# Patient Record
Sex: FEMALE | Race: WHITE | NOT HISPANIC OR LATINO | Employment: PART TIME | URBAN - METROPOLITAN AREA
[De-identification: names, ages, dates, MRNs, and addresses within clinical notes are randomized per-mention and may not be internally consistent; named-entity substitution may affect disease eponyms.]

---

## 2017-01-10 ENCOUNTER — ALLSCRIPTS OFFICE VISIT (OUTPATIENT)
Dept: OTHER | Facility: OTHER | Age: 22
End: 2017-01-10

## 2017-02-27 ENCOUNTER — GENERIC CONVERSION - ENCOUNTER (OUTPATIENT)
Dept: OTHER | Facility: OTHER | Age: 22
End: 2017-02-27

## 2017-02-27 DIAGNOSIS — G40.209 LOCALZ-RLTD SYMPTOMATIC EPILEPSY W CMPLX PART SZ, NOTINTRAC, WO STATUS (HCC): ICD-10-CM

## 2017-03-01 ENCOUNTER — LAB CONVERSION - ENCOUNTER (OUTPATIENT)
Dept: OTHER | Facility: OTHER | Age: 22
End: 2017-03-01

## 2017-03-01 LAB
DEPRECATED RDW RBC AUTO: 12.4 % (ref 11–15)
HCT VFR BLD AUTO: 42.5 % (ref 35–45)
HGB BLD-MCNC: 14 G/DL (ref 11.7–15.5)
MCH RBC QN AUTO: 30.7 PG (ref 27–33)
MCHC RBC AUTO-ENTMCNC: 33 G/DL (ref 32–36)
MCV RBC AUTO: 93.2 FL (ref 80–100)
PLATELET # BLD AUTO: 209 THOUSAND/UL (ref 140–400)
PMV BLD AUTO: 8.6 FL (ref 7.5–12.5)
RBC # BLD AUTO: 4.56 MILLION/UL (ref 3.8–5.1)
WBC # BLD AUTO: 4.2 THOUSAND/UL (ref 3.8–10.8)

## 2017-03-02 ENCOUNTER — GENERIC CONVERSION - ENCOUNTER (OUTPATIENT)
Dept: OTHER | Facility: OTHER | Age: 22
End: 2017-03-02

## 2017-03-02 ENCOUNTER — LAB CONVERSION - ENCOUNTER (OUTPATIENT)
Dept: OTHER | Facility: OTHER | Age: 22
End: 2017-03-02

## 2017-03-03 ENCOUNTER — GENERIC CONVERSION - ENCOUNTER (OUTPATIENT)
Dept: OTHER | Facility: OTHER | Age: 22
End: 2017-03-03

## 2017-03-03 ENCOUNTER — LAB CONVERSION - ENCOUNTER (OUTPATIENT)
Dept: OTHER | Facility: OTHER | Age: 22
End: 2017-03-03

## 2017-03-03 LAB
10-HYDROXYCARBAZEPINE (HISTORICAL): 35.6 MCG/ML (ref 8–35)
A/G RATIO (HISTORICAL): 1.6 (CALC) (ref 1–2.5)
ALBUMIN SERPL BCP-MCNC: 4.8 G/DL (ref 3.6–5.1)
ALP SERPL-CCNC: 59 U/L (ref 33–115)
ALT SERPL W P-5'-P-CCNC: 10 U/L (ref 6–29)
AST SERPL W P-5'-P-CCNC: 15 U/L (ref 10–30)
BILIRUB SERPL-MCNC: 0.3 MG/DL (ref 0.2–1.2)
BUN SERPL-MCNC: 11 MG/DL (ref 7–25)
BUN/CREA RATIO (HISTORICAL): NORMAL (CALC) (ref 6–22)
CALCIUM SERPL-MCNC: 9.4 MG/DL (ref 8.6–10.2)
CHLORIDE SERPL-SCNC: 104 MMOL/L (ref 98–110)
CO2 SERPL-SCNC: 27 MMOL/L (ref 20–31)
CREAT SERPL-MCNC: 0.84 MG/DL (ref 0.5–1.1)
DEPRECATED RDW RBC AUTO: 12.4 % (ref 11–15)
EGFR AFRICAN AMERICAN (HISTORICAL): 115 ML/MIN/1.73M2
EGFR-AMERICAN CALC (HISTORICAL): 99 ML/MIN/1.73M2
GAMMA GLOBULIN (HISTORICAL): 3 G/DL (CALC) (ref 1.9–3.7)
GLUCOSE (HISTORICAL): 85 MG/DL (ref 65–99)
HCT VFR BLD AUTO: 42.5 % (ref 35–45)
HGB BLD-MCNC: 14 G/DL (ref 11.7–15.5)
MCH RBC QN AUTO: 30.7 PG (ref 27–33)
MCHC RBC AUTO-ENTMCNC: 33 G/DL (ref 32–36)
MCV RBC AUTO: 93.2 FL (ref 80–100)
PLATELET # BLD AUTO: 209 THOUSAND/UL (ref 140–400)
PMV BLD AUTO: 8.6 FL (ref 7.5–12.5)
POTASSIUM SERPL-SCNC: 4.2 MMOL/L (ref 3.5–5.3)
RBC # BLD AUTO: 4.56 MILLION/UL (ref 3.8–5.1)
SODIUM SERPL-SCNC: 139 MMOL/L (ref 135–146)
TOTAL PROTEIN (HISTORICAL): 7.8 G/DL (ref 6.1–8.1)
WBC # BLD AUTO: 4.2 THOUSAND/UL (ref 3.8–10.8)

## 2017-06-05 ENCOUNTER — ALLSCRIPTS OFFICE VISIT (OUTPATIENT)
Dept: OTHER | Facility: OTHER | Age: 22
End: 2017-06-05

## 2017-10-18 ENCOUNTER — LAB CONVERSION - ENCOUNTER (OUTPATIENT)
Dept: OTHER | Facility: OTHER | Age: 22
End: 2017-10-18

## 2017-10-18 ENCOUNTER — GENERIC CONVERSION - ENCOUNTER (OUTPATIENT)
Dept: OTHER | Facility: OTHER | Age: 22
End: 2017-10-18

## 2017-10-18 LAB
DEPRECATED RDW RBC AUTO: 11.7 % (ref 11–15)
HCT VFR BLD AUTO: 41.1 % (ref 35–45)
HGB BLD-MCNC: 13.8 G/DL (ref 11.7–15.5)
MCH RBC QN AUTO: 30.6 PG (ref 27–33)
MCHC RBC AUTO-ENTMCNC: 33.6 G/DL (ref 32–36)
MCV RBC AUTO: 91.1 FL (ref 80–100)
PLATELET # BLD AUTO: 236 THOUSAND/UL (ref 140–400)
PMV BLD AUTO: 10.1 FL (ref 7.5–12.5)
RBC # BLD AUTO: 4.51 MILLION/UL (ref 3.8–5.1)
WBC # BLD AUTO: 5 THOUSAND/UL (ref 3.8–10.8)

## 2017-10-20 ENCOUNTER — GENERIC CONVERSION - ENCOUNTER (OUTPATIENT)
Dept: OTHER | Facility: OTHER | Age: 22
End: 2017-10-20

## 2017-10-20 ENCOUNTER — LAB CONVERSION - ENCOUNTER (OUTPATIENT)
Dept: OTHER | Facility: OTHER | Age: 22
End: 2017-10-20

## 2017-10-20 LAB
A/G RATIO (HISTORICAL): 1.9 (CALC) (ref 1–2.5)
ALBUMIN SERPL BCP-MCNC: 5.1 G/DL (ref 3.6–5.1)
ALP SERPL-CCNC: 62 U/L (ref 33–115)
ALT SERPL W P-5'-P-CCNC: 10 U/L (ref 6–29)
AST SERPL W P-5'-P-CCNC: 14 U/L (ref 10–30)
BILIRUB SERPL-MCNC: 0.4 MG/DL (ref 0.2–1.2)
BUN SERPL-MCNC: 12 MG/DL (ref 7–25)
BUN/CREA RATIO (HISTORICAL): NORMAL (CALC) (ref 6–22)
CALCIUM SERPL-MCNC: 10.2 MG/DL (ref 8.6–10.2)
CHLORIDE SERPL-SCNC: 102 MMOL/L (ref 98–110)
CO2 SERPL-SCNC: 30 MMOL/L (ref 20–31)
CREAT SERPL-MCNC: 0.88 MG/DL (ref 0.5–1.1)
DEPRECATED RDW RBC AUTO: 11.7 % (ref 11–15)
EGFR AFRICAN AMERICAN (HISTORICAL): 108 ML/MIN/1.73M2
EGFR-AMERICAN CALC (HISTORICAL): 93 ML/MIN/1.73M2
GAMMA GLOBULIN (HISTORICAL): 2.7 G/DL (CALC) (ref 1.9–3.7)
GLUCOSE (HISTORICAL): 85 MG/DL (ref 65–99)
HCT VFR BLD AUTO: 41.1 % (ref 35–45)
HGB BLD-MCNC: 13.8 G/DL (ref 11.7–15.5)
LAMOTRIGINE LEVEL (HISTORICAL): <0.5 MCG/ML (ref 4–18)
Lab: <0.5 MCG/ML
MCH RBC QN AUTO: 30.6 PG (ref 27–33)
MCHC RBC AUTO-ENTMCNC: 33.6 G/DL (ref 32–36)
MCV RBC AUTO: 91.1 FL (ref 80–100)
PLATELET # BLD AUTO: 236 THOUSAND/UL (ref 140–400)
PMV BLD AUTO: 10.1 FL (ref 7.5–12.5)
POTASSIUM SERPL-SCNC: 4.3 MMOL/L (ref 3.5–5.3)
RBC # BLD AUTO: 4.51 MILLION/UL (ref 3.8–5.1)
SODIUM SERPL-SCNC: 139 MMOL/L (ref 135–146)
TOTAL PROTEIN (HISTORICAL): 7.8 G/DL (ref 6.1–8.1)
WBC # BLD AUTO: 5 THOUSAND/UL (ref 3.8–10.8)

## 2017-10-23 ENCOUNTER — ALLSCRIPTS OFFICE VISIT (OUTPATIENT)
Dept: OTHER | Facility: OTHER | Age: 22
End: 2017-10-23

## 2017-10-23 ENCOUNTER — TRANSCRIBE ORDERS (OUTPATIENT)
Dept: ADMINISTRATIVE | Facility: HOSPITAL | Age: 22
End: 2017-10-23

## 2017-10-23 DIAGNOSIS — Q07.8: Primary | ICD-10-CM

## 2017-10-23 DIAGNOSIS — Q04.8 OTHER SPECIFIED CONGENITAL MALFORMATIONS OF BRAIN (CODE): ICD-10-CM

## 2017-10-23 DIAGNOSIS — Q07.8: ICD-10-CM

## 2017-10-23 DIAGNOSIS — Q04.8 CEREBRAL CORTICAL DYSGENESIS (HCC): ICD-10-CM

## 2017-10-24 NOTE — PROGRESS NOTES
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)    Plan  Congenital cerebral ventriculomegaly, Localization-related focal epilepsy with complex  partial seizures, Periventricular heterotopia    · Follow-up visit in 4 Months Evaluation and Treatment  Follow-up SOVL  Status: Complete   Done: 23Oct2017   Ordered; For: Congenital cerebral ventriculomegaly, Localization-related focal epilepsy with complex partial seizures, Periventricular heterotopia; Ordered By: Donna Downey Performed:  Due: 03XCS7260; Last Updated By: Karthik June; 10/23/2017 9:13:03 AM  Congenital cerebral ventriculomegaly, Periventricular heterotopia    · * MRI BRAIN SEIZURE WO AND W CONTRAST; Status:Need Information - Financial  Authorization; Requested [facility]:[id number];    Perform:[facility] Radiology; Order Comments:25year old woman with periventricular heterotopia and abnormal right frontal FLAIR signal that needs to be assessed for stability based on MRi brain report in August 2016 ; Due:23Oct2018; Last Updated By:Blanca Olguin; 10/23/2017 9:13:03 AM;Ordered; For:Congenital cerebral ventriculomegaly, Periventricular heterotopia; Ordered By:Jayde Aquino; Localization-related focal epilepsy with complex partial seizures    · OXcarbazepine 300 MG Oral Tablet; take 1 tablet by mouth twice daily (take with  600mg tablet)   Rx By: Donna Downey; Dispense: 30 Days ; #:60 Tablet; Refill: 5;For: Localization-related focal epilepsy with complex partial seizures; DANIEL = N; Verified Transmission to [facility]PHARMACY #[id number] Msg to Pharmacy: please discontinue 150mg tabs; Last Updated By: System, [employer]; 10/23/2017 8:57:12 AM   · OXcarbazepine 600 MG Oral Tablet; TAKE 1 TABLET TWICE DAILY(along with  one 300mg tab)   Rx By: Donna Downey; Dispense: 30 Days ; #:60 Tablet;  Refill: 5;For: Localization-related focal epilepsy with complex partial seizures; DANIEL = N; Verified Transmission to [facility]PHARMACY #2703 Msg to Pharmacy: Patient will take one each of 600mg tab and 300mg tab twice a day (total dose of 900mg twice a day); Last Updated By: System, SureScripts; 10/23/2017 8:57:12 AM   · (Q) BASIC METABOLIC PANEL W/O eGFR; Status:Active; Requested for:27Nov2017;    Perform:[facility]; [facility]:[id number]; Ordered; For:Localization-related focal epilepsy with complex partial seizures; Ordered By:Jorge Aquino;   · (Q) OXCARBAZEPINE METABOLITE; Status:Active; Requested for:27Nov2017;    Perform:[facility]; OJM:[id number]; Ordered; For:Localization-related focal epilepsy with complex partial seizures; Ordered By:Jorge Aquino;    Discussion/Summary  Discussion Summary:   Ms Russella Councilman is a 25year old woman with a diagnosis of localization related, 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)  She is tolerating oxcarbazepine without significant side effects  She has had at least one conclusive witnessed seizure that was likely secondary generalized  She continues to have recurrent episodes of visual distortions of the left visual field that are concerning for focal seizures without altered awareness (based on the location of her heterotopia, seizures may involve visual pathways)  to recurrent visual seizures, I recommended increasing oxcarbazepine or another antiepileptic medication or transitioning to lamotrigine  Due to some benefit in her anxiety and at least no convulsive seizure, I am leery of getting her off of oxcarbazepine at this time  Lamotrigine may help with anxiety and mood disorders, I reviewed side effects of lamotrigine; drug rash, Peter Rocker syndrome, headaches, insomnia, irritability, blood dyscrasia, and mood swings   Alternative to lamotrigine is topiramate and zonisamide, both medications can cause kidney stones, heat stroke, cognitive impairment, depression, and abnormal taste and paresthesia; topiramate has been associated with weight loss while zonisamide is generally associated with weight gain  At this point she is agreeable to trying to increase her oxcarbazepine dose, side effects typically involve ataxia, clumsiness, imbalance, cognitive impairment, hyponatremia, and mood disorders  If she has the symptoms she is willing to try lamotrigine  previously reviewed the diagnosis of epilepsy and epileptic seizures, side effects of oxcarbazepine, periodic drug monitoring, issues related to women of child bearing age, and medications interactions  I recommended that she start folic acid when she is sexually active  Due to interactions between oxcarbazepine and OCPs, I recommended that should she need contraception, barrier method (condom) and IUDs are preferred given the risk of breakthrough bleeding with OCPs  I reviewed the [address] [place] [address] drug registry program for post marketing surveillance of risk of birth defects associated with antiepileptic medications  I reviewed that women not taking any medications has a approximately 2% risk of having a child with fetal malformations  Woman taking antiseizure medications is about twice or 4% risk, more so with older antiepileptic medications, from the [address] [place] [address] antiepileptic drug registry program oxcarbazepine has about 2-4% risk of fetal malformations  However this is with very limited less than 300 patients  Risk of breakthrough seizure during pregnancy is mostly associated with pre pregnancy seizure control  I did recommend genetic testing due to her bilateral subependymal heterotopias as there are some genes associated with heterotopia  This would require referral to [facility] for counseling  At this time patient is not interested  to recurrent focal visual seizures affecting the left visual field, I must report her most recent seizure   Given that she has not experienced altered awareness or confusion I will not recommend license suspension  However she was instructed to restrict driving to local roads and slower traffic, not speed up with yellow lights or run red lights, and come to a complete stop on turns    She should avoid the highways because there may not be sufficient space on the highway for her to safely pull over to the side of the road if she was to have a focal seizure  She agrees to this restriction  previously discussed medications to avoid / medications that lower the seizure threshold (example decongestants, including both pseudoephedrine and phenylephrine, diphenydramine)  is likely part of her congenital cortical malformation, but signs of hydrocephalus would include worsening headaches, loss of vision, blurry vision, lethargy, somnolence, or enlargement of visual blindspot, recommend monitoring for clinical changes  It has been more than a year from her last MRI brain study, the MRI brain study should be repeated due to the finding of right frontal subcortical FLAIR abnormality; repeat imaging is needed to assess for stability of the abnormality (rule out ongoing demyelination or underlying neoplasm)  - Continue Oxcarbazepine 600mg one tab twice a day and two 150mg tabs twice a day (next script will be to replace 150mg to 300mg tab; take one 300mg tab twice a day instead)- call the office if you are having side effects with oxcarbazepine, if so then decrease back to original dose of 750mg twice a day (600mg tab plus half 300mg tab)- [employer] will report focal visual seizures without altered awareness, see above- blood work around late November; BMP, oxcarbazepine- call the office if you continue to have focal visual seizures or medication side effects; let us know if you are tolerating oxcarbazepine dose- follow-up in 4 months  -repeat MRI brain study to assess the stability of white matter abnormal signal and ventriculomegaly      Counseling Documentation With Imm: The patient was counseled regarding risk factor reductions,-- prognosis,-- impressions,-- risks and benefits of treatment options  Decision making was of high-complexity due to the patient's high risk condition (seizures), psychiatric and neuropsychological comorbidities, behavioral problems, memory and cognitive problems and medication side effects  total amount of time spent with the patient was 46 minutes  More than 50% of this time was devoted to counseling and coordination of care  Issues addressed during this clinic visit included overall management, medication counseling or monitoring (including adverse effects, side effects and risks of antiepileptic medications)  Start time: 8:10AM End time: 8:56AM       Chief Complaint  Chief Complaint Free Text Note Form: patient is here today for a follow up for her seizures      History of Present Illness  Ms Jenn Cobos is a 25year old right handed woman here for follow-up evaluation of seizures and abnormal brain findings  AED/side effects/compliance:  Oxcarbazepine 750mg twice a day  No side effect  No missed doses    Event/Seizure semiology:  1  Lightheaded blurry vision, loss of consciousness, convulsion  (Last one 8/24/2016)  2  Blurry vision, circles in a part of her visual field, followed by a period of headache, no clear confusion or disorientation (described in January 2017 visit)    HPI: Initial intake epilepsy history 9/12/2016of hospital records  Char Miller was admitted from 8/24-8/26/2016 to [facility] after a witnessed seizure  She had an MRI brain study that showed cortical malformations including heterotopias, she was subsequently started on levetiracetam 500mg twice a day  Dr Jp Sweeney did the initial consultation; she reported that her first seizure was in June 2016, onset of lightheadedness, blurry vision, no memory of passing out but she collapsed, the next thing she remembered was waking up in the ambulance   On the day of admission she felt lightheaded, blurry vision, veering to the sides of the aisle and collapsed, her mother witnessed some of the event but only after she fell; patientâs eyes were open and mouth was moving, arms were tonically flexed and asymmetric and legs were hypertonic with repeated relaxation and tonic stiffening  Again she did not regain consciousness until she was in the ambulance  She had a history of one febrile seizure, a paternal great grandmother with seizures, head CT scan found ventriculomegaly  [id number], Ms Cunningham recalled that she was at work and the next thing she remembered was people standing over her, she did not remember what happened before other than feeling lightheaded and blurry vision  She denied feeling exhausted, new medications, drugs, illness/fevers, and there were no changes in her usual habits  August 2016, again she does not recall much other than being told she had a seizure for less minute  Her mother reported that she did see the end of the seizure; initially Ms Zeb Cooper had her the arms and legs extended outward, then arms were flexed inward, convulsion lasted about 30 minute, and she was unresponsive for about 5 minutes but she was lethargic for about 15-20 minutes afterwards  Her mother had briefly turned away and did not witness the very start of the seizure  Again, there were no alcohol, drugs, medications, or illness experienced by Ms Cunningham  She denies any history of myoclonus, staring spells, automatisms, unexplained hyperkinetic behaviors, olfactory / gustatory hallucinations, epigastric rising events, boogie vu events, visual hallucinations, unexplained nocturnal enuresis, or nocturnal tongue biting  At 17 months, she had a cold with a super high temperature, she had a seizure, deemed to have been a simple febrile seizure    reports that since she has been on levetiracetam, she has been experiencing daily headaches that come and go throughout the day may last for a couple of hours at a time  These are very severe because she does not do well with pain  Headache is best describe as a throbbing sensation for a couple of hours, daily, she does not take any medication for pain because she is not sure if she can  There is no visual changes, no loss of vision, or changes in refraction  history 1/10/0231086-029  She feel much better with OXC than LVT  She denied side effects, rash, or significant mood changes  She continues to have issues with anxiety, mostly as it involves the possibility of a recurrent seizure  She reports having had two episodes of visual changes  About two days ago, while playing bingo, she had a sensation of blurry vision, more of circles in a part of her visual field, the numbers on the bingo card was blurry but she did not recall which side of her vision was affected  The visual changes lasted a couple of minutes, but did not progress to zoning out or passing out  Afterwards, she had a mild headache for about half an hour  She did not recall when was the second episode  history [id number]  Focal visual seizure on 2017 and mid-May 2017 (double vision, more to the left side of her vision), denies disorientation or altered awareness  Stressed at work, her manager was rushing her, for about a minute, she experienced double vision on the left side of her visual field, without a sense of disorientation  She continues to feel anxious, but she has adjusted to the diagnosis of epilepsy  [employer] self-help online program did not help with anxiety  She does not want to see a psychologist at this time     Featuresepilepticus: NoInjury Seizures: NoFactors: None  Risk Factors:pregnancy: Nobirth/: NoDevelopment: walked after a year, slower with languageseizures, simple: Yesseizures, complex: Noinfection: Noretardation: Nopalsy: Noinjury (moderate/severe): Noneoplasm: Nomalformation: Yes â MRI brain found ventriculomegaly, abnormal splenium of corpus callosum, and subependymal heterotopiaprocedure: NoNoabuse: Noabuse: Nohistory Sz/epilepsy: No  AEDs:(headaches), oxcarbazepine, gabapentin (headaches, cognitive impairment)       Review of Systems  A review of 12 organ systems was completed and all systems were negative except for what is detailed in the HPI and noted below   Neurological ROS:   Musculoskeletal:  no arthralgias, no myalgias, no immobility or loss of function, no head/neck/back pain, no pain while walking  Psychiatric: anxiety-- and-- depression  Neurological General: headache,-- syncope-- and-- vision  Active Problems  1  Anxiety (300 00) (F41 9)   2  Congenital cerebral ventriculomegaly (742 4) (Q04 8)   3  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 40) (G40 209)   4  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)    Past Medical History      Past Psychiatric History:  Depression: episodic, history of counseling  Anxiety: No  Psychosis: No    OBGYN issues: no current plan for children, not currently sexually active   Active Problems And Past Medical History Reviewed: The active problems and past medical history were reviewed and updated today  Family History  Father    1  Family history of essential hypertension (V17 49) (Z82 49)  Paternal Great Grandmother    2  Family history of Seizures  Maternal Grandfather    3  Family history of myocardial infarction (V17 3) (Z82 49)  Paternal Grandfather    4  Family history of myocardial infarction (V17 3) (Z82 49)  Family History Reviewed: The family history was reviewed and updated today  Paternal Janae Muñoz started to have seizures later in life  No developmental issues  Parents are healthy        Social History   · Never a smoker  Social History Reviewed: The social history was reviewed and updated today  Living situation:  lives with parents  Tobacco:  No tobacco use   Alcohol:  No alcohol use      Drugs:  No illegal drug use  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, works in retail  Driving:  Active driving but avoiding highways  She is not sexually active at this time; she declines folic acid supplementation        Current Meds   1  OXcarbazepine 150 MG Oral Tablet; take 1 tablet by mouth twice daily (take with 600mg   tablet); Therapy: [id number] to (Jennye Faster)  Requested for: 61VXC1289; Last   Rx:05Jun2017 Ordered   2  OXcarbazepine 600 MG Oral Tablet; TAKE 1 TABLET TWICE DAILY(along with one 150mg   tab); Therapy: 78Vhg5053 to (Jewelene Faster)  Requested for: 44RUR3103; Last   Rx:05Jun2017 Ordered    Allergies  1  No Known Drug Allergies    Vitals  Signs   Recorded: 08WZD9026 07:51AM   Heart Rate: 71  Respiration: 18  Systolic: 012  Diastolic: 83  Height: 5 ft 3 in  Weight: 122 lb 8 oz  BMI Calculated: 21 7  BSA Calculated: 1 57  O2 Saturation: 99    Physical Exam  Physical Exam was deferred for counseling  GENERAL:  normally developed person in no acute distress, atraumatic head  Eyes: Anicteric    MENTAL STATUS  Orientation: Intact  Fund of knowledge: Intact  Attention/concentration: Intact  Recent/remote memory: Intact  Language: Intact, no aphasia present    Station/Gait: Normal gait  Results/Data  (Q) GABAPENTIN, PLASMA 17Oct2017 09:21AM [facility]     Test Name Result Flag Reference   GABAPENTIN, PLASMA <0 5 mcg/mL     Reference ranges for Gabapentin:  2 7-4 1 mcg/mL (peak) following a single dose of  900-1800 mg/day  4 0-8 5 mcg/mL (peak) following a multiple dose of  900-1800 mg/day administration  The reference range is evolving  Seizure control  has been observed at levels in excess of 4 mcg/mL  This test was developed and its analytical performance  characteristics have been determined by [address] [place] [address]  It has  not been cleared or approved by the [place]  Food and Drug  Administration  This assay has been validated pursuant  to the CLIA regulations and is used for clinical  purposes       (Q) CBC (H/H, RBC, INDICES, WBC, PLT) [id number] 09:21AM [facility]   REPORT COMMENT:  FASTING:YES  AN UPDATE OR CORRECTION HAS BEEN MADE TO NAME     Test Name Result Flag Reference   WHITE BLOOD CELL COUNT 5 0 Thousand/uL  3 8-10 8   RED BLOOD CELL COUNT 4 51 Million/uL  3 80-5 10   HEMOGLOBIN 13 8 g/dL  11 7-15 5   HEMATOCRIT 41 1 %  35 0-45 0   MCV 91 1 fL  80 0-100 0   MCH 30 6 pg  27 0-33 0   MCHC 33 6 g/dL  32 0-36 0   RDW 11 7 %  11 0-15 0   PLATELET COUNT 002 Thousand/uL  140-400   MPV 10 1 fL  7 5-12 5     (Q) LAMOTRIGINE 17Oct2017 09:21AM [facility]     Test Name Result Flag Reference   LAMOTRIGINE <0 5 mcg/mL L 4 0-18 0   This test was developed and its analytical performance  characteristics have been determined by [address] [place] [address]  It has  not been cleared or approved by the [place]  Food and Drug  Administration  This assay has been validated pursuant  to the CLIA regulations and is used for clinical  purposes  Diagnostic Studies Reviewed:   MRI Review 8/25/2016 brainsubependymal heterotopias in the region of the atria of the lateral ventricles with bilateral colpocephalyof the splenium of the corpus callosumnonspecific FLAIR hyperintensity subcortical white matter right frontal lobe    Diagnostic Review EEGs:focal slowing over the right posterior temporal region but limited to the T6 electrode, consider repeating the study  driving is lateralized to the left; thus, possibly relative neuronal dysfunction in the right posterior region  metabolite 35 6  5 0/13 8/41/838965/4 3/102/30/12/0 9/857 8/5 1/0 4/14/10/62<0 5<0 5    Verified Results  (1) COMPREHENSIVE METABOLIC PANEL [id number] 13:95EG [facility]     Test Name Result Flag Reference   GLUCOSE 85 mg/dL  65-99   Fasting reference interval   UREA NITROGEN (BUN) 12 mg/dL  7-25   CREATININE 0 88 mg/dL  0 50-1 10   eGFR NON-AFR   AMERICAN 93 mL/min/1 73m2  > OR = 60   eGFR  108 mL/min/1 73m2  > OR = 60   BUN/CREATININE RATIO   8-19   NOT APPLICABLE (calc)   SODIUM 139 mmol/L  135-146   POTASSIUM 4 3 mmol/L  3 5-5 3   CHLORIDE 102 mmol/L     CARBON DIOXIDE 30 mmol/L  20-31   CALCIUM 10 2 mg/dL  8 6-10 2   PROTEIN, TOTAL 7 8 g/dL  6 1-8 1   ALBUMIN 5 1 g/dL  3 6-5 1   GLOBULIN 2 7 g/dL (calc)  1 9-3 7   ALBUMIN/GLOBULIN RATIO 1 9 (calc)  1 0-2 5   BILIRUBIN, TOTAL 0 4 mg/dL  0 2-1 2   ALKALINE PHOSPHATASE 62 U/L     AST 14 U/L  10-30   ALT 10 U/L  6-29     (Q) GABAPENTIN, PLASMA 17Oct2017 09:21AM [facility]     Test Name Result Flag Reference   GABAPENTIN, PLASMA <0 5 mcg/mL     Reference ranges for Gabapentin:  2 7-4 1 mcg/mL (peak) following a single dose of  900-1800 mg/day  4 0-8 5 mcg/mL (peak) following a multiple dose of  900-1800 mg/day administration  The reference range is evolving  Seizure control  has been observed at levels in excess of 4 mcg/mL  This test was developed and its analytical performance  characteristics have been determined by [address] [place] [address]  It has  not been cleared or approved by the [place]  Food and Drug  Administration  This assay has been validated pursuant  to the CLIA regulations and is used for clinical  purposes       (Q) CBC (H/H, RBC, INDICES, WBC, PLT) [id number] 09:21AM [facility]   REPORT COMMENT:  FASTING:YES  AN UPDATE OR CORRECTION HAS BEEN MADE TO NAME     Test Name Result Flag Reference   WHITE BLOOD CELL COUNT 5 0 Thousand/uL  3 8-10 8   RED BLOOD CELL COUNT 4 51 Million/uL  3 80-5 10   HEMOGLOBIN 13 8 g/dL  11 7-15 5   HEMATOCRIT 41 1 %  35 0-45 0   MCV 91 1 fL  80 0-100 0   MCH 30 6 pg  27 0-33 0   MCHC 33 6 g/dL  32 0-36 0   RDW 11 7 %  11 0-15 0   PLATELET COUNT 850 Thousand/uL  140-400   MPV 10 1 fL  7 5-12 5     (Q) LAMOTRIGINE 17Oct2017 09:21AM [facility]     Test Name Result Flag Reference   LAMOTRIGINE <0 5 mcg/mL L 4 0-18 0   This test was developed and its analytical performance  characteristics have been determined by [address] [place] [address]  It has  not been cleared or approved by the [place]  Food and Drug  Administration  This assay has been validated pursuant  to the CLIA regulations and is used for clinical  purposes         Signatures   Electronically signed by : BRYAN Smith ; Oct 23 2017  5:59PM EST                       (Author)

## 2017-11-07 ENCOUNTER — HOSPITAL ENCOUNTER (OUTPATIENT)
Dept: RADIOLOGY | Facility: HOSPITAL | Age: 22
Discharge: HOME | End: 2017-11-07
Attending: PSYCHIATRY & NEUROLOGY
Payer: COMMERCIAL

## 2017-11-07 DIAGNOSIS — Q04.8 OTHER SPECIFIED CONGENITAL MALFORMATIONS OF BRAIN (CODE): ICD-10-CM

## 2017-11-07 DIAGNOSIS — Q07.8: ICD-10-CM

## 2017-11-07 PROCEDURE — 70553 MRI BRAIN STEM W/O & W/DYE: CPT

## 2017-11-07 PROCEDURE — A9585 GADOBUTROL INJECTION: HCPCS | Performed by: PSYCHIATRY & NEUROLOGY

## 2017-11-07 RX ADMIN — GADOBUTROL 5.67 ML: 604.72 INJECTION INTRAVENOUS at 13:34

## 2017-11-08 ENCOUNTER — GENERIC CONVERSION - ENCOUNTER (OUTPATIENT)
Dept: OTHER | Facility: OTHER | Age: 22
End: 2017-11-08

## 2017-11-18 ENCOUNTER — GENERIC CONVERSION - ENCOUNTER (OUTPATIENT)
Dept: OTHER | Facility: OTHER | Age: 22
End: 2017-11-18

## 2017-11-21 ENCOUNTER — GENERIC CONVERSION - ENCOUNTER (OUTPATIENT)
Dept: OTHER | Facility: OTHER | Age: 22
End: 2017-11-21

## 2017-11-21 LAB
10-HYDROXYCARBAZEPINE (HISTORICAL): 28.5 MCG/ML (ref 8–35)
BUN SERPL-MCNC: 10 MG/DL (ref 7–25)
BUN/CREA RATIO (HISTORICAL): NORMAL (CALC) (ref 6–22)
CALCIUM SERPL-MCNC: 9.5 MG/DL (ref 8.6–10.2)
CHLORIDE SERPL-SCNC: 103 MMOL/L (ref 98–110)
CO2 SERPL-SCNC: 28 MMOL/L (ref 20–31)
CREAT SERPL-MCNC: 0.88 MG/DL (ref 0.5–1.1)
GLUCOSE (HISTORICAL): 76 MG/DL (ref 65–99)
POTASSIUM SERPL-SCNC: 4.2 MMOL/L (ref 3.5–5.3)
SODIUM SERPL-SCNC: 138 MMOL/L (ref 135–146)

## 2017-12-08 ENCOUNTER — LAB CONVERSION - ENCOUNTER (OUTPATIENT)
Dept: OTHER | Facility: OTHER | Age: 22
End: 2017-12-08

## 2017-12-08 LAB
BUN SERPL-MCNC: 11 MG/DL (ref 7–25)
BUN/CREA RATIO (HISTORICAL): NORMAL (CALC) (ref 6–22)
CALCIUM SERPL-MCNC: 9.4 MG/DL (ref 8.6–10.2)
CHLORIDE SERPL-SCNC: 105 MMOL/L (ref 98–110)
CO2 SERPL-SCNC: 28 MMOL/L (ref 20–31)
CREAT SERPL-MCNC: 0.82 MG/DL (ref 0.5–1.1)
EGFR AFRICAN AMERICAN (HISTORICAL): 118 ML/MIN/1.73M2
EGFR-AMERICAN CALC (HISTORICAL): 102 ML/MIN/1.73M2
GLUCOSE (HISTORICAL): 80 MG/DL (ref 65–99)
POTASSIUM SERPL-SCNC: 4.3 MMOL/L (ref 3.5–5.3)
SODIUM SERPL-SCNC: 139 MMOL/L (ref 135–146)

## 2017-12-10 ENCOUNTER — GENERIC CONVERSION - ENCOUNTER (OUTPATIENT)
Dept: OTHER | Facility: OTHER | Age: 22
End: 2017-12-10

## 2017-12-12 ENCOUNTER — LAB CONVERSION - ENCOUNTER (OUTPATIENT)
Dept: OTHER | Facility: OTHER | Age: 22
End: 2017-12-12

## 2017-12-12 LAB
10-HYDROXYCARBAZEPINE (HISTORICAL): 37.6 MCG/ML (ref 8–35)
BUN SERPL-MCNC: 11 MG/DL (ref 7–25)
BUN/CREA RATIO (HISTORICAL): NORMAL (CALC) (ref 6–22)
CALCIUM SERPL-MCNC: 9.4 MG/DL (ref 8.6–10.2)
CHLORIDE SERPL-SCNC: 105 MMOL/L (ref 98–110)
CO2 SERPL-SCNC: 28 MMOL/L (ref 20–31)
CREAT SERPL-MCNC: 0.82 MG/DL (ref 0.5–1.1)
EGFR AFRICAN AMERICAN (HISTORICAL): 118 ML/MIN/1.73M2
EGFR-AMERICAN CALC (HISTORICAL): 102 ML/MIN/1.73M2
GLUCOSE (HISTORICAL): 80 MG/DL (ref 65–99)
POTASSIUM SERPL-SCNC: 4.3 MMOL/L (ref 3.5–5.3)
SODIUM SERPL-SCNC: 139 MMOL/L (ref 135–146)

## 2017-12-13 ENCOUNTER — GENERIC CONVERSION - ENCOUNTER (OUTPATIENT)
Dept: OTHER | Facility: OTHER | Age: 22
End: 2017-12-13

## 2018-01-11 NOTE — RESULT NOTES
Verified Results  (1) COMPREHENSIVE METABOLIC PANEL [id number] 60:86DL [facility]     Test Name Result Flag Reference   GLUCOSE 85 mg/dL  65-99   Fasting reference interval   UREA NITROGEN (BUN) 12 mg/dL  7-25   CREATININE 0 88 mg/dL  0 50-1 10   eGFR NON-AFR  AMERICAN 93 mL/min/1 73m2  > OR = 60   eGFR AFRICAN AMERICAN 108 mL/min/1 73m2  > OR = 60   BUN/CREATININE RATIO   4-18   NOT APPLICABLE (calc)   SODIUM 139 mmol/L  135-146   POTASSIUM 4 3 mmol/L  3 5-5 3   CHLORIDE 102 mmol/L     CARBON DIOXIDE 30 mmol/L  20-31   CALCIUM 10 2 mg/dL  8 6-10 2   PROTEIN, TOTAL 7 8 g/dL  6 1-8 1   ALBUMIN 5 1 g/dL  3 6-5 1   GLOBULIN 2 7 g/dL (calc)  1 9-3 7   ALBUMIN/GLOBULIN RATIO 1 9 (calc)  1 0-2 5   BILIRUBIN, TOTAL 0 4 mg/dL  0 2-1 2   ALKALINE PHOSPHATASE 62 U/L     AST 14 U/L  10-30   ALT 10 U/L  6-29     (Q) GABAPENTIN, PLASMA [id number] 09:21AM [facility]     Test Name Result Flag Reference   GABAPENTIN, PLASMA <0 5 mcg/mL     Reference ranges for Gabapentin:  2 7-4 1 mcg/mL (peak) following a single dose of  900-1800 mg/day  4 0-8 5 mcg/mL (peak) following a multiple dose of  900-1800 mg/day administration  The reference range is evolving  Seizure control  has been observed at levels in excess of 4 mcg/mL  This test was developed and its analytical performance  characteristics have been determined by [address] [place] [address]  It has  not been cleared or approved by the [place]  Food and Drug  Administration  This assay has been validated pursuant  to the CLIA regulations and is used for clinical  purposes       (Q) CBC (H/H, RBC, INDICES, WBC, PLT) [id number] 09:21AM [facility]   REPORT COMMENT:  FASTING:YES  AN UPDATE OR CORRECTION HAS BEEN MADE TO NAME     Test Name Result Flag Reference   WHITE BLOOD CELL COUNT 5 0 Thousand/uL  3 8-10 8   RED BLOOD CELL COUNT 4 51 Million/uL  3 80-5 10   HEMOGLOBIN 13 8 g/dL  11 7-15 5   HEMATOCRIT 41 1 %  35 0-45 0   MCV 91 1 fL  80 0-100 0   MCH 30 6 pg  27 0-33 0   MCHC 33 6 g/dL  32 0-36 0   RDW 11 7 %  11 0-15 0   PLATELET COUNT 218 Thousand/uL  140-400   MPV 10 1 fL  7 5-12 5

## 2018-01-11 NOTE — PROCEDURES
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 G40 209    Start time:  End time: 94     -------------------------------------------------------------------------------------------------------------------   Patient History: This recording was performed in a 24 y o  female  with multiple seizures and subependymal gray matter heterotopia to confirm epilepsy classification and re-evaluate right posterior temporal focal slowing seen on prior EEG  Medications include:   levetiracetam    -------------------------------------------------------------------------------------------------------------------   Description of Procedure:  ·  32 channel digital recording with electrodes placed according to the International 10-20 system with additional  T1/T2 electrodes, EOG, EKG, and simultaneous  video  The recording was technically satisfactory  -------------------------------------------------------------------------------------------------------------------   EEG Description:    The recording was performed with the patient awake and drowsy  She was oriented  During wakefulness, there were long runs of well regulated, low to medium amplitude,  posteriorly dominant, symmetric 10 cps alpha rhythm that attenuated with eye opening  There were symmetric low amplitude, frontally dominant  beta activities       With drowsiness, alpha activity attenuated some and and there were intermixed diffusely distributed  low amplitude theta activities  Stage 2 sleep was not attained  Activation Procedures:  Hyperventilation was performed for 3-4  minutes and did not produce any changes  Stepped photic stimulation between 1-3 0 cps induced photic driving that lateralized to the left (better formed and more consistently seen on the left)  Other findings:  Samples of the single lead ECG demonstrated a regular rhythm  Events:   No significant push buttons were activated  -------------------------------------------------------------------------------------------------------------------   EEG Interpretation: This Routine EEG recorded during wakefulness and drowsiness mildly abnormal     Photic driving is lateralized to the left raising the possibility of relative neuronal dysfunction over the right hemisphere, including the possibility of focal neuronal dysfunction over the right posterior region  In the absence of other focal or lateralizing  abnormalities this finding is of uncertain significance  However, the prior EEG in 8/2016 showed focal slowing over the right posterior temporal providing further evidence that asymmetric driving may be due to neuronal dysfunction in the right posterior  region  No interictal epileptiform discharges are present  Ankur Larsen MD   [address] [facility] Margarita MARRERO    Sep 23 2016 10:36AM [facility] Standard Time

## 2018-01-12 NOTE — RESULT NOTES
Verified Results  (Q) BASIC METABOLIC PANEL W/O eGFR [id number] 10:05AM [facility]   REPORT COMMENT:  FASTING:NO     Test Name Result Flag Reference   GLUCOSE 76 mg/dL  65-99   Fasting reference interval   UREA NITROGEN (BUN) 10 mg/dL  7-25   CREATININE 0 88 mg/dL  0 50-1 10   BUN/CREATININE RATIO   3-15   NOT APPLICABLE (calc)   SODIUM 138 mmol/L  135-146   POTASSIUM 4 2 mmol/L  3 5-5 3   CHLORIDE 103 mmol/L     CARBON DIOXIDE 28 mmol/L  20-31   CALCIUM 9 5 mg/dL  8 6-10 2

## 2018-01-12 NOTE — RESULT NOTES
Verified Results  * MRI BRAIN SEIZURE WO AND W CONTRAST [id number] 12:32PM Jennifer Cross Order Number: [id number]   Performing Comments: 25year old woman with periventricular heterotopia and abnormal right frontal FLAIR signal that needs to be assessed for stability based on MRi brain report in August 2016    - Patient Instructions: To schedule this appointment,    please contact Central Scheduling at [phone]  Test Name Result Flag Reference   MRI BRAIN SEIZURE WO AND W CONTRAST (Report)     MRI BRAIN - WITH AND WITHOUT CONTRAST     INDICATION: Seizure disorder , Periventricular heterotopia, abnormal FLAIR signal     COMPARISON: MR 8/25/2016     TECHNIQUE: Sagittal 3D SPGR, axial T2, axial FLAIR, axial T1, axial Morris, coronal T2 and FLAIR  Post-contrast axial T1 , Sagittal 3D SPGR with coronal recons  IV Contrast: 5 67 mL of gadobutrol injection (MULTI-DOSE)      IMAGE QUALITY:  Diagnostic  FINDINGS:     BRAIN PARENCHYMA: No acute disease  There is no acute ischemia, intracranial mass, mass effect or edema  No hemosiderin deposition  Stable colpocephalic appearance to the ventricles, right greater than left, bowing/stretching of the posterior corpus    callosum  Subtle foci what could be nodular heterotopia along the atria of both lateral ventricles more apparent on the right  Tiny focus of increased signal in the white matter of the right frontal horn is less apparent  This has not progressed  Hippocampi are somewhat dysmorphic bilaterally  Postcontrast imaging of the brain demonstrates no abnormal enhancement  VENTRICLES: Stable colpocephaly  SELLA AND PITUITARY GLAND: Normal      ORBITS: Normal      PARANASAL SINUSES: Trace scattered mucosal disease, minor left mastoid fluid  VASCULATURE: Evaluation of the major intracranial vasculature demonstrates appropriate flow voids       CALVARIUM AND SKULL BASE: Normal      EXTRACRANIAL SOFT TISSUES: Normal  IMPRESSION:     Stable MR appearance the brain  No acute disease  Right greater than left colpocephaly, minor nodular heterotopia  Signal abnormality within the right frontal white matter is less conspicuous  Workstation performed: [id number]     Signed by:    Raven Ernandez MD   11/8/17

## 2018-01-12 NOTE — RESULT NOTES
Verified Results  (Q) OXCARBAZEPINE METABOLITE [id number] 10:05AM Edita Moraa   REPORT COMMENT:  FASTING:NO     Test Name Result Flag Reference   10-HYDROXYCARBAZEPINE 28 5 mcg/mL  8 0-35 0     (Q) BASIC METABOLIC PANEL W/O eGFR [id number] 10:05AM [facility]     Test Name Result Flag Reference   GLUCOSE 76 mg/dL  65-99   Fasting reference interval   UREA NITROGEN (BUN) 10 mg/dL  7-25   CREATININE 0 88 mg/dL  0 50-1 10   BUN/CREATININE RATIO   5-28   NOT APPLICABLE (calc)   SODIUM 138 mmol/L  135-146   POTASSIUM 4 2 mmol/L  3 5-5 3   CHLORIDE 103 mmol/L     CARBON DIOXIDE 28 mmol/L  20-31   CALCIUM 9 5 mg/dL  8 6-10 2

## 2018-01-13 VITALS
WEIGHT: 120 LBS | SYSTOLIC BLOOD PRESSURE: 118 MMHG | DIASTOLIC BLOOD PRESSURE: 72 MMHG | BODY MASS INDEX: 21.26 KG/M2 | RESPIRATION RATE: 16 BRPM | HEART RATE: 76 BPM | HEIGHT: 63 IN

## 2018-01-13 VITALS
WEIGHT: 120 LBS | HEART RATE: 72 BPM | BODY MASS INDEX: 21.26 KG/M2 | RESPIRATION RATE: 16 BRPM | DIASTOLIC BLOOD PRESSURE: 74 MMHG | SYSTOLIC BLOOD PRESSURE: 120 MMHG | HEIGHT: 63 IN

## 2018-01-13 VITALS
OXYGEN SATURATION: 99 % | HEIGHT: 63 IN | BODY MASS INDEX: 21.71 KG/M2 | HEART RATE: 71 BPM | RESPIRATION RATE: 18 BRPM | SYSTOLIC BLOOD PRESSURE: 127 MMHG | DIASTOLIC BLOOD PRESSURE: 83 MMHG | WEIGHT: 122.5 LBS

## 2018-01-13 NOTE — RESULT NOTES
Verified Results  (1) COMPREHENSIVE METABOLIC PANEL [id number] 53:74OU Oval      Test Name Result Flag Reference   GLUCOSE 85 mg/dL  65-99   Fasting reference interval   UREA NITROGEN (BUN) 11 mg/dL  7-25   CREATININE 0 84 mg/dL  0 50-1 10   eGFR NON-AFR   AMERICAN 99 mL/min/1 73m2  > OR = 60   eGFR AFRICAN AMERICAN 115 mL/min/1 73m2  > OR = 60   BUN/CREATININE RATIO   4-70   NOT APPLICABLE (calc)   SODIUM 139 mmol/L  135-146   POTASSIUM 4 2 mmol/L  3 5-5 3   CHLORIDE 104 mmol/L     CARBON DIOXIDE 27 mmol/L  20-31   CALCIUM 9 4 mg/dL  8 6-10 2   PROTEIN, TOTAL 7 8 g/dL  6 1-8 1   ALBUMIN 4 8 g/dL  3 6-5 1   GLOBULIN 3 0 g/dL (calc)  1 9-3 7   ALBUMIN/GLOBULIN RATIO 1 6 (calc)  1 0-2 5   BILIRUBIN, TOTAL 0 3 mg/dL  0 2-1 2   ALKALINE PHOSPHATASE 59 U/L     AST 15 U/L  10-30   ALT 10 U/L  6-29     (Q) CBC (H/H, RBC, INDICES, WBC, PLT) [id number] 10:04AM [facility]    REPORT COMMENT:  FASTING:NO     Test Name Result Flag Reference   WHITE BLOOD CELL COUNT 4 2 Thousand/uL  3 8-10 8   RED BLOOD CELL COUNT 4 56 Million/uL  3 80-5 10   HEMOGLOBIN 14 0 g/dL  11 7-15 5   HEMATOCRIT 42 5 %  35 0-45 0   MCV 93 2 fL  80 0-100 0   MCH 30 7 pg  27 0-33 0   MCHC 33 0 g/dL  32 0-36 0   RDW 12 4 %  11 0-15 0   PLATELET COUNT 111 Thousand/uL  140-400   MPV 8 6 fL  7 5-12 5     (Q) CBC (H/H, RBC, INDICES, WBC, PLT) [id number] 10:04AM [facility]    REPORT COMMENT:  FASTING:NO     Test Name Result Flag Reference   WHITE BLOOD CELL COUNT 4 2 Thousand/uL  3 8-10 8   RED BLOOD CELL COUNT 4 56 Million/uL  3 80-5 10   HEMOGLOBIN 14 0 g/dL  11 7-15 5   HEMATOCRIT 42 5 %  35 0-45 0   MCV 93 2 fL  80 0-100 0   MCH 30 7 pg  27 0-33 0   MCHC 33 0 g/dL  32 0-36 0   RDW 12 4 %  11 0-15 0   PLATELET COUNT 205 Thousand/uL  140-400   MPV 8 6 fL  7 5-12 5

## 2018-01-13 NOTE — RESULT NOTES
Verified Results  (1) COMPREHENSIVE METABOLIC PANEL [id number] 41:61WV Horleonard Lajama     Test Name Result Flag Reference   GLUCOSE 85 mg/dL  65-99   Fasting reference interval   UREA NITROGEN (BUN) 12 mg/dL  7-25   CREATININE 0 88 mg/dL  0 50-1 10   eGFR NON-AFR   AMERICAN 93 mL/min/1 73m2  > OR = 60   eGFR AFRICAN AMERICAN 108 mL/min/1 73m2  > OR = 60   BUN/CREATININE RATIO   5-03   NOT APPLICABLE (calc)   SODIUM 139 mmol/L  135-146   POTASSIUM 4 3 mmol/L  3 5-5 3   CHLORIDE 102 mmol/L     CARBON DIOXIDE 30 mmol/L  20-31   CALCIUM 10 2 mg/dL  8 6-10 2   PROTEIN, TOTAL 7 8 g/dL  6 1-8 1   ALBUMIN 5 1 g/dL  3 6-5 1   GLOBULIN 2 7 g/dL (calc)  1 9-3 7   ALBUMIN/GLOBULIN RATIO 1 9 (calc)  1 0-2 5   BILIRUBIN, TOTAL 0 4 mg/dL  0 2-1 2   ALKALINE PHOSPHATASE 62 U/L     AST 14 U/L  10-30   ALT 10 U/L  6-29     (Q) CBC (H/H, RBC, INDICES, WBC, PLT) [id number] 09:21AM Pepe Aquino   REPORT COMMENT:  FASTING:YES  AN UPDATE OR CORRECTION HAS BEEN MADE TO NAME     Test Name Result Flag Reference   WHITE BLOOD CELL COUNT 5 0 Thousand/uL  3 8-10 8   RED BLOOD CELL COUNT 4 51 Million/uL  3 80-5 10   HEMOGLOBIN 13 8 g/dL  11 7-15 5   HEMATOCRIT 41 1 %  35 0-45 0   MCV 91 1 fL  80 0-100 0   MCH 30 6 pg  27 0-33 0   MCHC 33 6 g/dL  32 0-36 0   RDW 11 7 %  11 0-15 0   PLATELET COUNT 392 Thousand/uL  140-400   MPV 10 1 fL  7 5-12 5

## 2018-01-14 NOTE — PROCEDURES
Procedures by Esteban Atkins MD at  2016  2:28 PM      Author:  Esteban Atkins MD Service:  Neurology Author Type:  Physician     Filed:  2016  2:37 PM Date of Service:  2016  2:28 PM Status:  Signed     :  Esteban Atkins MD (Physician)            ELECTROENCEPHALOGRAM    Patient Name:  Mia Noguera  MRN: [id number]   :  1995 File #: [facility] A[facility]    Age: 24 y o  Encounter #: [id number]   Date performed: 2016 Referring Provider: Magali Reyez DO          Report date: 2016          Study type: awake EEG    ICD 10 diagnosis: Other Epilepsy G40 909 and Spells/Fit NOS R56 9    Patient History:  EEG is requested to assess for seizures and/or classification of epilepsy  Patient is 24 y o  female presenting to hospital after a seizure, she had one other seizure in 2016  Current AEDs:  Medications include:   enoxaparin 40 mg Subcutaneous Daily   levETIRAcetam 500 mg Intravenous Q12H       Description of Procedure: The EEG was performed with electrodes applied using the International 10-20 System  Additional electrodes used included EOG, ECG and T1/T2 electrodes  A single lead ECG channel is also  present  At least 16 channels are reviewed at a time  and formatted into longitudinal bipolar, transverse bipolar, and referential (to common reference or calculated common reference) montages  The EEG was recorded  with the patient awake, drowsy, and asleep  The recording was technically satisfactory  EEG was recorded from 13:16 to 13:44  Findings:   Background Activity: The background is grossly symmetric with respect to voltages and activities  During wakefulness, the background is well-organized with anterior low amplitude beta activity and low-moderate amplitude  posterior alpha activity  There is a symmetric 8 5-9 Hz posterior dominant rhythm that attenuates with eye opening        Drowsiness is characterized by attenuation of the alpha rhythm, prominence of anterior beta, central theta activity, presence of vertex waves, and positive occipital sharp transients of sleep (POSTS)  Stage 2 sleep is characterized by symmetric sleep spindles and K-complexes  Activation Procedures:   Hyperventilation was not performed  Stepped photic stimulation from 1 to 30 fps was performed and produced symmetric photic driving response  Abnormal Findings: There is intermittent polymorphic low-moderate voltage 1-1 5 Hz delta activity over the right posterior temporal region but is limited to the T6 electrode  Other findings: The single lead ECG shows a regular sinus cardiac rhythm  Interpretation: This is an abnormal 28 minutes awake, drowsy, and asleep  EEG due to intermittent focal slowing over the right posterior temporal region but limited to the T6 electrode  This finding may indicate focal cerebral dysfunction over the right posterior temporal region, possibly structural in origin; correlation with intracranial imaging is recommended  Given that the focal slowing is very limited to the T6 electrode, repeating the EEG study in the future is recommended to determine if it is a persistent finding versus electrode artifact  Ian Hess, MD [facility]  Elaine MARRERO    Aug 25 2016  2:35PM [facility] Standard Time

## 2018-01-17 NOTE — RESULT NOTES
Verified Results  (1) COMPREHENSIVE METABOLIC PANEL [id number] 80:38LI Juan Motley     Test Name Result Flag Reference   GLUCOSE 76 mg/dL  65-99   Fasting reference interval   UREA NITROGEN (BUN) 10 mg/dL  7-25   CREATININE 0 81 mg/dL  0 50-1 10   eGFR NON-AFR   AMERICAN 104 mL/min/1 73m2  > OR = 60   eGFR AFRICAN AMERICAN 120 mL/min/1 73m2  > OR = 60   BUN/CREATININE RATIO   2-10   NOT APPLICABLE (calc)   SODIUM 138 mmol/L  135-146   POTASSIUM 4 3 mmol/L  3 5-5 3   CHLORIDE 102 mmol/L     CARBON DIOXIDE 27 mmol/L  20-31   CALCIUM 9 7 mg/dL  8 6-10 2   PROTEIN, TOTAL 8 0 g/dL  6 1-8 1   ALBUMIN 5 1 g/dL  3 6-5 1   GLOBULIN 2 9 g/dL (calc)  1 9-3 7   ALBUMIN/GLOBULIN RATIO 1 8 (calc)  1 0-2 5   BILIRUBIN, TOTAL 0 4 mg/dL  0 2-1 2   ALKALINE PHOSPHATASE 55 U/L     AST 14 U/L  10-30   ALT 9 U/L  6-29     (Q) CBC (H/H, RBC, INDICES, WBC, PLT) [id number] 09:45AM Juan Motley     Test Name Result Flag Reference   WHITE BLOOD CELL COUNT 5 9 Thousand/uL  3 8-10 8   RED BLOOD CELL COUNT 4 70 Million/uL  3 80-5 10   HEMOGLOBIN 13 9 g/dL  11 7-15 5   HEMATOCRIT 42 6 %  35 0-45 0   MCV 90 6 fL  80 0-100 0   MCH 29 7 pg  27 0-33 0   MCHC 32 7 g/dL  32 0-36 0   RDW 12 1 %  11 0-15 0   PLATELET COUNT 945 Thousand/uL  140-400   MPV 8 8 fL  7 5-11 5     (Q) OXCARBAZEPINE METABOLITE [id number] 09:45AM Juan Motley   REPORT COMMENT:  FASTING:NO     Test Name Result Flag Reference   10-HYDROXYCARBAZEPINE 9 5 mcg/mL  8 0-35 0

## 2018-01-18 NOTE — RESULT NOTES
Verified Results  (1) COMPREHENSIVE METABOLIC PANEL [id number] 32:63DR Dewain Pap     Test Name Result Flag Reference   GLUCOSE 76 mg/dL  65-99   Fasting reference interval   UREA NITROGEN (BUN) 10 mg/dL  7-25   CREATININE 0 81 mg/dL  0 50-1 10   eGFR NON-AFR   AMERICAN 104 mL/min/1 73m2  > OR = 60   eGFR AFRICAN AMERICAN 120 mL/min/1 73m2  > OR = 60   BUN/CREATININE RATIO   0-92   NOT APPLICABLE (calc)   SODIUM 138 mmol/L  135-146   POTASSIUM 4 3 mmol/L  3 5-5 3   CHLORIDE 102 mmol/L     CARBON DIOXIDE 27 mmol/L  20-31   CALCIUM 9 7 mg/dL  8 6-10 2   PROTEIN, TOTAL 8 0 g/dL  6 1-8 1   ALBUMIN 5 1 g/dL  3 6-5 1   GLOBULIN 2 9 g/dL (calc)  1 9-3 7   ALBUMIN/GLOBULIN RATIO 1 8 (calc)  1 0-2 5   BILIRUBIN, TOTAL 0 4 mg/dL  0 2-1 2   ALKALINE PHOSPHATASE 55 U/L     AST 14 U/L  10-30   ALT 9 U/L  6-29     (Q) CBC (H/H, RBC, INDICES, WBC, PLT) [id number] 09:45AM Dewain Pap   REPORT COMMENT:  FASTING:NO     Test Name Result Flag Reference   WHITE BLOOD CELL COUNT 5 9 Thousand/uL  3 8-10 8   RED BLOOD CELL COUNT 4 70 Million/uL  3 80-5 10   HEMOGLOBIN 13 9 g/dL  11 7-15 5   HEMATOCRIT 42 6 %  35 0-45 0   MCV 90 6 fL  80 0-100 0   MCH 29 7 pg  27 0-33 0   MCHC 32 7 g/dL  32 0-36 0   RDW 12 1 %  11 0-15 0   PLATELET COUNT 006 Thousand/uL  140-400   MPV 8 8 fL  7 5-11 5

## 2018-01-18 NOTE — RESULT NOTES
Verified Results  (1) COMPREHENSIVE METABOLIC PANEL [id number] 36:64ZM Jack Min     Test Name Result Flag Reference   GLUCOSE 85 mg/dL  65-99   Fasting reference interval   UREA NITROGEN (BUN) 11 mg/dL  7-25   CREATININE 0 84 mg/dL  0 50-1 10   eGFR NON-AFR   AMERICAN 99 mL/min/1 73m2  > OR = 60   eGFR AFRICAN AMERICAN 115 mL/min/1 73m2  > OR = 60   BUN/CREATININE RATIO   3-34   NOT APPLICABLE (calc)   SODIUM 139 mmol/L  135-146   POTASSIUM 4 2 mmol/L  3 5-5 3   CHLORIDE 104 mmol/L     CARBON DIOXIDE 27 mmol/L  20-31   CALCIUM 9 4 mg/dL  8 6-10 2   PROTEIN, TOTAL 7 8 g/dL  6 1-8 1   ALBUMIN 4 8 g/dL  3 6-5 1   GLOBULIN 3 0 g/dL (calc)  1 9-3 7   ALBUMIN/GLOBULIN RATIO 1 6 (calc)  1 0-2 5   BILIRUBIN, TOTAL 0 3 mg/dL  0 2-1 2   ALKALINE PHOSPHATASE 59 U/L     AST 15 U/L  10-30   ALT 10 U/L  6-29     (Q) CBC (H/H, RBC, INDICES, WBC, PLT) [id number] 10:04AM Jack Min     Test Name Result Flag Reference   WHITE BLOOD CELL COUNT 4 2 Thousand/uL  3 8-10 8   RED BLOOD CELL COUNT 4 56 Million/uL  3 80-5 10   HEMOGLOBIN 14 0 g/dL  11 7-15 5   HEMATOCRIT 42 5 %  35 0-45 0   MCV 93 2 fL  80 0-100 0   MCH 30 7 pg  27 0-33 0   MCHC 33 0 g/dL  32 0-36 0   RDW 12 4 %  11 0-15 0   PLATELET COUNT 162 Thousand/uL  140-400   MPV 8 6 fL  7 5-12 5     (Q) OXCARBAZEPINE METABOLITE [id number] 10:04AM Jack Min   REPORT COMMENT:  FASTING:NO     Test Name Result Flag Reference   10-HYDROXYCARBAZEPINE 35 6 mcg/mL H 8 0-35 0

## 2018-01-23 NOTE — RESULT NOTES
Verified Results  (1) BASIC METABOLIC PROFILE [id number] 09:40AM Delmis Button     Test Name Result Flag Reference   GLUCOSE 80 mg/dL  65-99   Fasting reference interval   UREA NITROGEN (BUN) 11 mg/dL  7-25   CREATININE 0 82 mg/dL  0 50-1 10   eGFR NON-AFR   AMERICAN 102 mL/min/1 73m2  > OR = 60   eGFR AFRICAN AMERICAN 118 mL/min/1 73m2  > OR = 60   BUN/CREATININE RATIO   7-54   NOT APPLICABLE (calc)   SODIUM 139 mmol/L  135-146   POTASSIUM 4 3 mmol/L  3 5-5 3   CHLORIDE 105 mmol/L     CARBON DIOXIDE 28 mmol/L  20-31   CALCIUM 9 4 mg/dL  8 6-10 2     (Q) OXCARBAZEPINE METABOLITE [id number] 09:40AM Delmis Button   REPORT COMMENT:  FASTING:NO     Test Name Result Flag Reference   10-HYDROXYCARBAZEPINE 37 6 mcg/mL H 8 0-35 0

## 2018-01-23 NOTE — RESULT NOTES
Verified Results  (1) BASIC METABOLIC PROFILE [id number] 09:40AM Fredi Labette   REPORT COMMENT:  FASTING:NO     Test Name Result Flag Reference   GLUCOSE 80 mg/dL  65-99   Fasting reference interval   UREA NITROGEN (BUN) 11 mg/dL  7-25   CREATININE 0 82 mg/dL  0 50-1 10   eGFR NON-AFR   AMERICAN 102 mL/min/1 73m2  > OR = 60   eGFR AFRICAN AMERICAN 118 mL/min/1 73m2  > OR = 60   BUN/CREATININE RATIO   0-80   NOT APPLICABLE (calc)   SODIUM 139 mmol/L  135-146   POTASSIUM 4 3 mmol/L  3 5-5 3   CHLORIDE 105 mmol/L     CARBON DIOXIDE 28 mmol/L  20-31   CALCIUM 9 4 mg/dL  8 6-10 2

## 2018-03-02 ENCOUNTER — TELEPHONE (OUTPATIENT)
Dept: NEUROLOGY | Facility: CLINIC | Age: 23
End: 2018-03-02

## 2018-03-19 ENCOUNTER — TELEPHONE (OUTPATIENT)
Dept: NEUROLOGY | Facility: CLINIC | Age: 23
End: 2018-03-19

## 2018-03-19 ENCOUNTER — OFFICE VISIT (OUTPATIENT)
Dept: NEUROLOGY | Facility: CLINIC | Age: 23
End: 2018-03-19
Payer: COMMERCIAL

## 2018-03-19 VITALS
HEART RATE: 88 BPM | WEIGHT: 120.4 LBS | DIASTOLIC BLOOD PRESSURE: 76 MMHG | HEIGHT: 63 IN | BODY MASS INDEX: 21.33 KG/M2 | SYSTOLIC BLOOD PRESSURE: 138 MMHG

## 2018-03-19 DIAGNOSIS — Q04.8 CONGENITAL CEREBRAL VENTRICULOMEGALY (HCC): ICD-10-CM

## 2018-03-19 DIAGNOSIS — F41.9 ANXIETY: ICD-10-CM

## 2018-03-19 DIAGNOSIS — H53.19 VISUAL DISTORTIONS: ICD-10-CM

## 2018-03-19 DIAGNOSIS — R51.9 HEADACHE, UNSPECIFIED HEADACHE TYPE: ICD-10-CM

## 2018-03-19 DIAGNOSIS — G40.209 LOCALIZATION-RELATED FOCAL EPILEPSY WITH COMPLEX PARTIAL SEIZURES (HCC): Primary | ICD-10-CM

## 2018-03-19 DIAGNOSIS — Q07.8: ICD-10-CM

## 2018-03-19 PROCEDURE — 99215 OFFICE O/P EST HI 40 MIN: CPT | Performed by: PSYCHIATRY & NEUROLOGY

## 2018-03-19 RX ORDER — OXCARBAZEPINE 600 MG/1
1200 TABLET, FILM COATED ORAL EVERY 12 HOURS SCHEDULED
Qty: 120 TABLET | Refills: 5 | Status: SHIPPED | OUTPATIENT
Start: 2018-03-19 | End: 2018-05-08 | Stop reason: SDUPTHER

## 2018-03-19 NOTE — PROGRESS NOTES
Review of Systems    {LimROS-complete:[id number]}      Review of Systems   Constitutional: Negative  HENT: Negative  Eyes: Negative  Blurred vision   Respiratory: Negative  Cardiovascular: Negative  Gastrointestinal: Positive for nausea  Endocrine: Negative  Genitourinary: Negative  Musculoskeletal: Negative  Skin: Negative  Allergic/Immunologic: Negative  Neurological: Positive for headaches  Balance problems   Hematological: Negative  Psychiatric/Behavioral: The patient is nervous/anxious           Depression

## 2018-03-19 NOTE — LETTER
March 20, 2018     Jean Carlos Turpin DO  [address]  [address]  [facility] [address]    Patient: Terra [address]   YOB: 1995   Date of Visit: 3/19/2018       Dear Dr Vishal Engle: Thank you for referring Suzanne Turner to me for evaluation  Below are my notes for this consultation  If you have questions, please do not hesitate to call me  I look forward to following your patient along with you  Sincerely,        Virginia Dancer, MD        CC: No Recipients  Virginia Dancer, MD  3/20/2018  8:24 AM  Sign at close encounter  Visit Type: follow-up  Referring MD / PCP:  Jean Carlos Turpin DO     Assessment:    Ms Heide Abad is a 25 y o  woman with localization related, 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)  She has had at least one conclusive witnessed seizure that was likely secondary generalized  She continues to have occasional recurrent episodes of visual distortions of the left visual field that are concerning for focal seizures without altered awareness (based on the location of her heterotopia, seizures may involve visual pathways)  She is tolerating high dose oxcarbazepine without clear side effects  It is unfortunate that Ms Marlee Lynn continues to have these episodes of left sided visual distortions without altered awareness, presumed to be focal visual seizures  However, she has an accompanying headache, which may call into question the possibility of migraines with aura  At this point, we are using anticonvulsants to prevent the focal seizures  We will maximize oxcarbazepine prior to starting another medication  If she continues to have these visual distortions, then we have to consider switching AEDs or adjunctive therapy with zonisamide, Lyrica, or lamotrigine    I reviewed the side effects of these medications, which include, but not limited to, drug rash, Geraline Juancarlos syndrome, bone marrow suppression, kidney stones, metabolic acidosis, liver toxicity, renal insufficiency, mood swings, irritability, suicidal ideation, abnormal liver enzymes, bronchospasms, ataxia, incoordination, cognitive impairment, headaches, GI upset, nausea, vomiting, weight gain and angioedema  Her MRI brain study is stable, no worsening of ventriculomegaly and the right frontal FLAIR anomaly is stable  I explained to Ms Cunningham that we will try at least two antiseizure medications that she tolerates before inpatient EMU study  The inpatient EMU study would be helpful in dissociating between focal visual seizures and headaches  If we determine that these are focal seizures then we will continue to manage with antiepileptic medications  If these are migraine variants then we can consider alternative therapies such as beta blockers, TCA, or nutri-supplements  At this point, Ms Uzma Mena has failed gabapentin due to intolerance and side effects, it would be appropriate for her to try Lyrica  Ms Uzma Mena continues to have adjustment anxiety from her epilepsy, concern when she will have another seizure and when she has her visual distortions if she is going to get worse  I recommend psychological counseling to help adapt or get a better perspective on her illness  Ms Uzma Mena is currently declining a referral as she does not believe that it will be helpful  Plan:   Problem List Items Addressed This Visit        Nervous and Auditory    Congenital cerebral ventriculomegaly (HCC)    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) - Primary    Relevant Medications    OXcarbazepine (TRILEPTAL) 600 mg tablet    Other Relevant Orders    Oxcarbazepine level    CBC    Comprehensive metabolic panel    Periventricular heterotopia (HCC)       Other    Anxiety    Headache, unspecified headache type    Visual distortions          Patient Instructions   Localization related epilepsy, from cortical malformations    Recurrent focal seizures of left sided visual distortions followed by headache  Differential diagnosis of these symptoms can be migraines  Video EEG monitoring would help determine if your symptoms are seizures or migraine based on the EEG during your typical seizure/spell  PLAN:  1 - increase Oxcarbazepine 600mg tab take 2 tabs twice a day  2 - in a couple of weeks check oxcarbazepine, CBC, CMP  3 - call the office if seizures recur or you are experiencing side effects, falls, stumbling, incoordination; will start Lyrica for adjunctive therapy  4 - if seizures are more frequent (twice a month), will get routine EEG with plan for inpatient continuous video EEG monitoring to characterize seizures  5 - follow-up in 4 months    The process for admission to the Epilepsy Monitoring Unit (EMU) was discussed with the patient at length  I explained to her that she would be admitted to the EMU in order to better characterize and localized the events she is having  I also explained that, during her EMU admission, medications will be tapered, patients are sleep deprived, and undergo other induction procedures (including hyperventilation, photic stimulation, exercise) in order to induce a seizure  This will allow me to safely gain additional information about the precise nature and localization of her events  I also explained to the patient that, although the average EMU stay is approximately 3-7 days, the length of stay may be shorter or longer, depending on the time needed to induce a seizure  Patients generally will have a peripheral IV, may be on telemetry, and have DVT prophylaxis that may include heparin or low-molecular weight heparin injections and venodynes    During the EMU, patients will be on continuous video EEG monitoring which may result in skin breakdown from the electrodes and chemicals used to keep the electrodes in place (usually treatment with antibacterial ointment is sufficient), and be restricted to the bed/room for prolonged periods of time  Due to the induction of seizures, the EMU is the best setting to offer safety and management of acute seizures, which cannot be monitored from the home setting  Risk of inducing seizures include injuries, death, recurrent seizures including status epilepticus and the requirement of rescue medications including use of anesthesia and mechanical ventilation to abort recurrent seizures  Chief Complaint:    Chief Complaint     Seizures        HPI:    Lesa Lugo is a 25 y o  right handed female here for follow-up evaluation of focal seizures, visual distortions with headache and MRI brain follow-up  Interval History 3/19/2018  She was previously seen on oxcarbazepine 900mg twice a day, increased due to recurrent visual symptoms  These include visual disturbances of circles on the left visual field followed by a headache, no disorientation or confusion  She reports lastone 3/11/2018, another one on 2/2/2018  She was instructed to increase oxcarbazepine to 900mg in AM and 1200mg in PM   She is tolerating the dose without significant side effects  She feels that she has less headaches and more tolerable  In the past headaches would last half a day, she would take Advil up to 3 tabs  These were not frequent (less than once a month headaches)  She has episodes of dizzy spells when she is at work, one time was related to her focal seizures  She works as a , staring at the computer all day, for 4-4 5 hours with 15 minutes  She denies alcohol, illness, or missing doses of oxcarbazepine  She denies convulsions, loss of consciousness, altered awareness, or appearance of zoning out or disorientation  AED/side effects/compliance:  Oxcarbazepine 900mg in AM and 1200mg in PM  No side effect  No missed doses    Event/Seizure semiology:  1  Lightheaded blurry vision, loss of consciousness, convulsion  (Last one 8/24/2016)  2    Blurry vision, circles in a part of her visual field, followed by a period of headache, no clear confusion or disorientation (described in January 2017 visit) - recurs every 1-5 months    Prior Epilepsy History:  Initial intake epilepsy history 9/12/2016  Review of hospital records  Ms Sowmya Talbot was admitted from 8/24-8/26/2016 to [facility] after a witnessed seizure  She had an MRI brain study that showed cortical malformations including heterotopias, she was subsequently started on levetiracetam 500mg twice a day  Dr Rian Polo did the initial consultation; she reported that her first seizure was in June 2016, onset of lightheadedness, blurry vision, no memory of passing out but she collapsed, the next thing she remembered was waking up in the ambulance  On the day of admission she felt lightheaded, blurry vision, veering to the sides of the aisle and collapsed, her mother witnessed some of the event but only after she fell; patients eyes were open and mouth was moving, arms were tonically flexed and asymmetric and legs were hypertonic with repeated relaxation and tonic stiffening  Again she did not regain consciousness until she was in the ambulance  She had a history of one febrile seizure, a paternal great grandmother with seizures, head CT scan found ventriculomegaly  Patients history  June 2016, Ms Cunningham recalled that she was at work and the next thing she remembered was people standing over her, she did not remember what happened before other than feeling lightheaded and blurry vision  She denied feeling exhausted, new medications, drugs, illness/fevers, and there were no changes in her usual habits  August 2016, again she does not recall much other than being told she had a seizure for less minute  Her mother reported that she did see the end of the seizure; initially Ms Sowmya Talbot had her the arms and legs extended outward, then arms were flexed inward, convulsion lasted about 30 minute, and she was unresponsive for about 5 minutes but she was lethargic for about 15-20 minutes afterwards  Her mother had briefly turned away and did not witness the very start of the seizure  Again, there were no alcohol, drugs, medications, or illness experienced by Ms Cunningham  She denies any history of myoclonus, staring spells, automatisms, unexplained hyperkinetic behaviors, olfactory / gustatory hallucinations, epigastric rising events, boogie vu events, visual hallucinations, unexplained nocturnal enuresis, or nocturnal tongue biting  At 17 months, she had a cold with a super high temperature, she had a seizure, deemed to have been a simple febrile seizure  She reports that since she has been on levetiracetam, she has been experiencing daily headaches that come and go throughout the day may last for a couple of hours at a time  These are very severe because she does not do well with pain  Headache is best describe as a throbbing sensation for a couple of hours, daily, she does not take any medication for pain because she is not sure if she can  There is no visual changes, no loss of vision, or changes in refraction  Summary 2017  Switched LVT to [facility], better tolerated, over the course of 2017, [employer] was escalated for recurrent visual distortions of the left side, circles, blurry vision (sensation of visual obscuration lasting for 2 minutes, left side swirls, if severe enough that she would temporarily not see clearly on the left visual field), lasting minutes without altered awareness or loss of consciousness  There is a headache that follows for about half an hour  These visual distortions have been sporadic once 1-5 months  We had tried gabapentin in the Spring/summer 2017 but she stopped taking it because of side effects / headaches (horrible, feeling like everything was in slow motion)  Sometimes these visual distortions are triggered by stress at work  She works part time as a  4-5 hours    I wrote a letter on her behalf to get a 10-15 minutes break every 2 hours  She reports issues with anxiety, mostly as it involves the possibility of a recurrent seizure  She feels like she has been looking over her shoulder because of her epilepsy  She tried [employer] but found it useless  She did get her 's license back with [employer] report of occasional visual seizures      Special Features  Status epilepticus: No  Self Injury Seizures: No  Precipitating Factors: None    Epilepsy Risk Factors:  Abnormal pregnancy: No  Abnormal birth/: No  Abnormal Development: walked after a year, slower with language  Febrile seizures, simple: Yes  Febrile seizures, complex: No  CNS infection: No  Mental retardation: No  Cerebral palsy: No  Head injury (moderate/severe): No  CNS neoplasm: No  CNS malformation: Yes  MRI brain found ventriculomegaly, abnormal splenium of corpus callosum, and subependymal heterotopia  Neurosurgical procedure: No  Stroke: No  Alcohol abuse: No  Drug abuse: No  Family history Sz/epilepsy: No    Prior AEDs:  Levetiracetam (headaches), oxcarbazepine, gabapentin (side effect)    Prior workup:      [id number]   MRI brain  Bilateral subependymal heterotopias in the region of the atria of the lateral ventricles with bilateral colpocephaly  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  Solitary nonspecific FLAIR hyperintensity subcortical white matter right frontal lobe    2017  Stable colpocephalic appearance to the ventricles, right greater than left, bowing / stretching of the posterior corpus callosum  Subtle foci nodular heterotopia along atria of both lateral ventricles more apparent on the right  Tiny focus of increased signal in the white matter of the right frontal horn is less apparent  Hippocampi are somewhat dysmorphic bilaterally      EEGs:  2016  Intermittent focal slowing over the right posterior temporal region but limited to the T6 electrode, consider repeating the study    2016  Photic driving is lateralized to the left; thus, possibly relative neuronal dysfunction in the right posterior region  Labs:  Component      Latest Ref Rng & Units 12/8/2017           9:40 AM   Glucose      65 - 99 mg/dL 80   BUN      7 - 25 mg/dL 11   Creatinine      0 50 - 1 10 mg/dL 0 82   EGFR-AMERICAN CALC      > OR = 60 mL/min/1 73m2 102   AAGFR      > OR = 60 mL/min/1 73m2 118   BUN/CREA Ratio      6 - 22 (calc) NOT APPLICABLE   Sodium      135 - 146 mmol/L 139   Potassium      3 5 - 5 3 mmol/L 4 3   Chloride      98 - 110 mmol/L 105   CO2      20 - 31 mmol/L 28   Calcium      8 6 - 10 2 mg/dL 9 4   10-HYDROXYCARBAZEPINE (HISTORICAL)      8 0 - 35 0 mcg/mL 37 6 (H)       General exam   Blood pressure 138/76, pulse 88, height 5' 3" (1 6 m), weight 54 6 kg (120 lb 6 4 oz)    Appearance: normally developed, appears well  Carotids: n/a  Cardiovascular: regular rate and rhythm and normal heart sounds  Pulmonary: clear to auscultation    HEENT: anicteric and moist mucus membranes / oral cavity   Fundoscopy: not assessed    Mental status  Orientation: alert and oriented to name, place, time  Fund of Knowledge: intact   Attention and Concentration: intact  Current and Remote Memory:intact  Language: no aphasia and spontaneous speech is normal    Cranial Nerves  CN 1: not tested  CN 2: pupils equal round reactive to direct and consenual light   CN 3, 4, 6: EOMI, no nystagmus  CN 5:not assessed  CN 7:muscles of facial expression are symmetric  CN 8:not assessed  CN 9, 10:no dysarthria present  CN 11:symmetric strength of sternocleidomastoid and trapezius muscles  CN 12:tongue is midline    Motor:  Bulk, Tone: normal bulk, normal tone  Pronation: no pronator drift  Strength: symmetric strength in arms and legs  Abnormal movements: no abnormal movements are present    Sensory:  Lighttouch: intact in all limbs  Romberg:not assessed    Coordination:  FNF:FNF bilaterally intact  ERICA:intact  FFM:intact  Gait/Station:normal gait and normal tandem gait    Reflexes:  not assessed    Past Medical/Surgical History:  Patient Active Problem List   Diagnosis    Anxiety    [facility]en[facility] cerebral [facility]ulomegaly ([facility] [address] [facility]    Localization-related focal epilepsy with complex partial seizures ([facility] [address] [facility]    Mixed emotional features as adjustment reaction    Periventricular heterotopia (HCC)    Headache, unspecified headache type    Visual distortions     Past Medical History:   Diagnosis Date    Cerebral ventriculomegaly 8/25/2016    Seizures ([facility] [address] )     Syncopal episodes      History reviewed  No pertinent surgical history  Past Psychiatric History:  Depression: No  Anxiety: Yes  Psychosis: No    Medications:    Current Outpatient Prescriptions:     OXcarbazepine (TRILEPTAL) 600 mg tablet, Take 2 tablets (1,200 mg total) by mouth every 12 (twelve) hours Please discontinue 150mg and 300mg tabs  , Disp: 120 tablet, Rfl: 5    Allergies:  No Known Allergies    Family history:  Family History   Problem Relation Age of Onset    No Known Problems Mother     No Known Problems Father     No Known Problems Brother      Paternal Greg Jude Grandmother started to have seizures later in life  No developmental issues  Parents are healthy    Social History  Living situation:  lives with parents  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, works in retail; 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[address] [id number] [employer]); sometimes she needs to use a ladder  She has been feeling stressed out, works on a computer for a couple of hours  Driving:  active license  Tobacco:    History   Smoking Status    Never Smoker   Smokeless Tobacco    Never Used     Alcohol:    History   Alcohol Use No       Drugs:    History   Drug Use No     Review of Systems  A review of at least 12 organ/systems was obtained by the medical assistant and reviewed by me, including additional positives/negatives:    Eyes: Negative  Blurred vision   Respiratory: Negative  Cardiovascular: Negative  Gastrointestinal: Positive for nausea  Endocrine: Negative  Genitourinary: Negative  Musculoskeletal: Negative  Skin: Negative  Allergic/Immunologic: Negative  Neurological: Positive for headaches  Balance problems   Hematological: Negative  Psychiatric/Behavioral: The patient is nervous/anxious  Depression     Decision making was of high-complexity due to the patient's high risk condition (seizures), psychiatric and neuropsychological comorbidities, behavioral problems, memory and cognitive problems and medication side effects  The total amount of time spent with the patient was 50 minutes  More than 50% of this time was devoted to counseling and coordination of care  Issues addressed during this clinic visit included overall management, medication counseling or monitoring (including adverse effects, side effects and risks of antiepileptic medications)     Start time: 12PM  End time: 12:50PM

## 2018-03-19 NOTE — PATIENT INSTRUCTIONS
Localization related epilepsy, from cortical malformations  Recurrent focal seizures of left sided visual distortions followed by headache  Differential diagnosis of these symptoms can be migraines  Video EEG monitoring would help determine if your symptoms are seizures or migraine based on the EEG during your typical seizure/spell  PLAN:  1 - increase Oxcarbazepine 600mg tab take 2 tabs twice a day  2 - in a couple of weeks check oxcarbazepine, CBC, CMP  3 - call the office if seizures recur or you are experiencing side effects, falls, stumbling, incoordination; will start Lyrica for adjunctive therapy  4 - if seizures are more frequent (twice a month), will get routine EEG with plan for inpatient continuous video EEG monitoring to characterize seizures  5 - follow-up in 4 months    The process for admission to the Epilepsy Monitoring Unit (EMU) was discussed with the patient at length  I explained to her that she would be admitted to the EMU in order to better characterize and localized the events she is having  I also explained that, during her EMU admission, medications will be tapered, patients are sleep deprived, and undergo other induction procedures (including hyperventilation, photic stimulation, exercise) in order to induce a seizure  This will allow me to safely gain additional information about the precise nature and localization of her events  I also explained to the patient that, although the average EMU stay is approximately 3-7 days, the length of stay may be shorter or longer, depending on the time needed to induce a seizure  Patients generally will have a peripheral IV, may be on telemetry, and have DVT prophylaxis that may include heparin or low-molecular weight heparin injections and venodynes    During the EMU, patients will be on continuous video EEG monitoring which may result in skin breakdown from the electrodes and chemicals used to keep the electrodes in place (usually treatment with antibacterial ointment is sufficient), and be restricted to the bed/room for prolonged periods of time  Due to the induction of seizures, the EMU is the best setting to offer safety and management of acute seizures, which cannot be monitored from the home setting  Risk of inducing seizures include injuries, death, recurrent seizures including status epilepticus and the requirement of rescue medications including use of anesthesia and mechanical ventilation to abort recurrent seizures  Video Electroencephalopathy (VEEG) and the Epilepsy Monitoring Unit (EMU)    What is video EEG?  In video-EEG, you are video taped at the same time as your brain waves are recorded   This typically occurs in the hospital over a long period of time, often an average of 3-7 days   The doctor is able to review both the video and EEG at the same time to see exactly what your brain waves are doing while watching what your body is doing  Why do I need video-EEG? Typically the reasons to have a video-EEG study are to make a diagnosis, classify the type of seizures and epilepsy, and if medications do not prevent the seizures then offer an alternative therapy (pre-surgical evaluation)   Some people have events that look like an epileptic seizure (caused by electrical storm of the brain); but are in fact not due to abnormal electrical activity in the brain  Other causes include cardiac or vascular pathology, other neurological causes such as tics and movement disorders, or psychological / psychogenic nonepileptic events   There are also many different kinds of epileptic seizures  The video EEG will help classify the seizures and epilepsy syndrome to determine the best medications or treatments   Nearly a third of epilepsy patients are medically refractory (failed 2 or more appropriate anti-seizure medications) and epilepsy surgery may be an option for these patients   VEEG is used to evaluate where seizures start in the brain, determine if surgery may be possible and guide the specific surgical approach for patients who are found to be surgical candidates  What to expect in the Epilepsy Monitoring Unit (EMU)  The EMU is a specialized inpatient unit in the hospital specially designed for evaluation of seizure disorders  The unit is equipped with computer-based monitoring equipment, certified EEG technologists, trained nurses, and attending physicians trained in the management of epilepsy   In the EMU, seizures are recorded and specially reviewed so that a proper diagnosis can be made and treatment can be optimized   Each patient will have a private room and a private bathroom   Patients will be connected to video-EEG equipment continuously (24 hours a day)  o There is no video recording while in the restroom, but brain waves are recorded on the EEG at all times  o Although this can be bothersome, continuous monitoring at all times is necessary to make sure patients are safe and that the necessary information is collected   Because patients are connected to recording equipment, mobility is restricted to the patients room   Patients are not be able to take a shower or wash your hair while on EEG monitoring  o This would interfere with your EEG electrodes  o Washing with a basin or sink is permitted   Patients are asked to activate a push button when they experience an event and then state aloud what they are experiencing   Nurses will test the patient during an event to help the doctors see what is occurring   To increase the chance of capturing a seizure in a limited amount of time a variety of activation procedures are employed    o Sleep deprivation (no naps during the day and attempts to keep you awake all night as often as every other night)  o Photic stimulation and hyperventilation procedures  o Weaning or withdrawal of medications used to control seizures (the attending physician will discuss this with you)   You will need to have a peripheral IV placed in your hand or arm  This allows the doctors to give rescue medications in the event of a medical emergency while you are in the hospital  You also may be given a blood thinner in the form of daily subcutaneous injections to prevent deep venous thrombosis (DVT)   The hospital is a non-smoking facility, therefore smoking is not allowed   Chewing gum is not allowed, this creates artifact on the EEG   If you are a woman of childbearing age, you may be asked to take a urine pregnancy test    We strive to make the EMU as safe as possible  Throughout the world video EEG monitoring is the standard of care and thousands of patients have been admitted to epilepsy monitoring units and there are rare cases of complications due to severely difficult to control epilepsy  These have included seizure clusters, status epilepticus (seizures that do not stop with typical medications and advanced measures are required), injuries (fractures and head injury), and very rarely death  How long do patients stay in the EMU?  The length of time varies for each patient   Prepare to stay about 1 week (even though it may not last that long)  o Typically, patients stay between 3-7 days, but may stay more or less time in special circumstances  Things you can do to prepare for admission   Take a shower and wash your hair the night before or the morning of admission   Do not use any hair products such as gels, sprays, mousse, or hair weaves  o It is NOT necessary to cut your hair or shave your head for the test     Unless you received specific instructions from your physician, continue to take your medications as you normally do, including the morning of your admission   Bring all of your current medications in the original prescription bottles to the hospital (some specialty medications may not be available in the hospital)      Bring a complete list of your medical and surgical history   Bring your seizure calendar   Wear comfortable clothing or pajamas  o Button-down shirts and elastic-waist pants are preferred    o You can bring slippers or sneakers for when you are walking around the room   You can bring activities like computers, phones, cards, music, movies, etc  with you to keep you occupied  o Electronic devices cannot be plugged in while you are touching them (this interferes with the equipment), but can be charging on the other side of the room      If you need any medical devices (such as a CPAP for obstructive sleep apnea), please bring it to the hospital

## 2018-03-19 NOTE — TELEPHONE ENCOUNTER
----- [facility] at 3/19/2018 12:52 PM EDT -----  Contact: [phone]  Kathryn,    I have attempted multiple times to call you and set an EMU admission for the patient but you were not available  Please contact patient to schedule EMU admission  I have given the patient the paper with the instructions   [phone]    Thank you,    Flora Song

## 2018-03-19 NOTE — PROGRESS NOTES
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)  She has had at least one conclusive witnessed seizure that was likely secondary generalized  She continues to have occasional recurrent episodes of visual distortions of the left visual field that are concerning for focal seizures without altered awareness (based on the location of her heterotopia, seizures may involve visual pathways)  She is tolerating high dose oxcarbazepine without clear side effects  It is unfortunate that Ms Yohan Brennan continues to have these episodes of left sided visual distortions without altered awareness, presumed to be focal visual seizures  However, she has an accompanying headache, which may call into question the possibility of migraines with aura  At this point, we are using anticonvulsants to prevent the focal seizures  We will maximize oxcarbazepine prior to starting another medication  If she continues to have these visual distortions, then we have to consider switching AEDs or adjunctive therapy with zonisamide, Lyrica, or lamotrigine  I reviewed the side effects of these medications, which include, but not limited to, drug rash, Krystyna Abelson syndrome, bone marrow suppression, kidney stones, metabolic acidosis, liver toxicity, renal insufficiency, mood swings, irritability, suicidal ideation, abnormal liver enzymes, bronchospasms, ataxia, incoordination, cognitive impairment, headaches, GI upset, nausea, vomiting, weight gain and angioedema  Her MRI brain study is stable, no worsening of ventriculomegaly and the right frontal FLAIR anomaly is stable  I explained to Ms Cunningham that we will try at least two antiseizure medications that she tolerates before inpatient EMU study    The inpatient EMU study would be helpful in dissociating between focal visual seizures and headaches  If we determine that these are focal seizures then we will continue to manage with antiepileptic medications  If these are migraine variants then we can consider alternative therapies such as beta blockers, TCA, or nutri-supplements  At this point, Ms Linda Busch has failed gabapentin due to intolerance and side effects, it would be appropriate for her to try Lyrica  Ms Linda Busch continues to have adjustment anxiety from her epilepsy, concern when she will have another seizure and when she has her visual distortions if she is going to get worse  I recommend psychological counseling to help adapt or get a better perspective on her illness  Ms Linda Busch is currently declining a referral as she does not believe that it will be helpful  Plan:   Problem List Items Addressed This Visit        Nervous and Auditory    Congenital cerebral ventriculomegaly (HCC)    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) - Primary    Relevant Medications    OXcarbazepine (TRILEPTAL) 600 mg tablet    Other Relevant Orders    Oxcarbazepine level    CBC    Comprehensive metabolic panel    Periventricular heterotopia (HCC)       Other    Anxiety    Headache, unspecified headache type    Visual distortions          Patient Instructions   Localization related epilepsy, from cortical malformations  Recurrent focal seizures of left sided visual distortions followed by headache  Differential diagnosis of these symptoms can be migraines  Video EEG monitoring would help determine if your symptoms are seizures or migraine based on the EEG during your typical seizure/spell  PLAN:  1 - increase Oxcarbazepine 600mg tab take 2 tabs twice a day  2 - in a couple of weeks check oxcarbazepine, CBC, CMP  3 - call the office if seizures recur or you are experiencing side effects, falls, stumbling, incoordination; will start Lyrica for adjunctive therapy    4 - if seizures are more frequent (twice a month), will get routine EEG with plan for inpatient continuous video EEG monitoring to characterize seizures  5 - follow-up in 4 months    The process for admission to the Epilepsy Monitoring Unit (EMU) was discussed with the patient at length  I explained to her that she would be admitted to the EMU in order to better characterize and localized the events she is having  I also explained that, during her EMU admission, medications will be tapered, patients are sleep deprived, and undergo other induction procedures (including hyperventilation, photic stimulation, exercise) in order to induce a seizure  This will allow me to safely gain additional information about the precise nature and localization of her events  I also explained to the patient that, although the average EMU stay is approximately 3-7 days, the length of stay may be shorter or longer, depending on the time needed to induce a seizure  Patients generally will have a peripheral IV, may be on telemetry, and have DVT prophylaxis that may include heparin or low-molecular weight heparin injections and venodynes  During the EMU, patients will be on continuous video EEG monitoring which may result in skin breakdown from the electrodes and chemicals used to keep the electrodes in place (usually treatment with antibacterial ointment is sufficient), and be restricted to the bed/room for prolonged periods of time  Due to the induction of seizures, the EMU is the best setting to offer safety and management of acute seizures, which cannot be monitored from the home setting  Risk of inducing seizures include injuries, death, recurrent seizures including status epilepticus and the requirement of rescue medications including use of anesthesia and mechanical ventilation to abort recurrent seizures      Chief Complaint:    Chief Complaint     Seizures        HPI:    Cecile Barreto is a 25 y o  right handed female here for follow-up evaluation of focal seizures, visual distortions with headache and MRI brain follow-up  Interval History 3/19/2018  She was previously seen on oxcarbazepine 900mg twice a day, increased due to recurrent visual symptoms  These include visual disturbances of circles on the left visual field followed by a headache, no disorientation or confusion  She reports lastone 3/11/2018, another one on 2/2/2018  She was instructed to increase oxcarbazepine to 900mg in AM and 1200mg in PM   She is tolerating the dose without significant side effects  She feels that she has less headaches and more tolerable  In the past headaches would last half a day, she would take Advil up to 3 tabs  These were not frequent (less than once a month headaches)  She has episodes of dizzy spells when she is at work, one time was related to her focal seizures  She works as a , staring at the computer all day, for 4-4 5 hours with 15 minutes  She denies alcohol, illness, or missing doses of oxcarbazepine  She denies convulsions, loss of consciousness, altered awareness, or appearance of zoning out or disorientation  AED/side effects/compliance:  Oxcarbazepine 900mg in AM and 1200mg in PM  No side effect  No missed doses    Event/Seizure semiology:  1  Lightheaded blurry vision, loss of consciousness, convulsion  (Last one 8/24/2016)  2  Blurry vision, circles in a part of her visual field, followed by a period of headache, no clear confusion or disorientation (described in January 2017 visit) - recurs every 1-5 months    Prior Epilepsy History:  Initial intake epilepsy history 9/12/2016  Review of hospital records  Ms Shun Hooker was admitted from 8/24-8/26/2016 to [facility] after a witnessed seizure  She had an MRI brain study that showed cortical malformations including heterotopias, she was subsequently started on levetiracetam 500mg twice a day    Dr Lexie Gitelman did the initial consultation; she reported that her first seizure was in June 2016, onset of lightheadedness, blurry vision, no memory of passing out but she collapsed, the next thing she remembered was waking up in the ambulance  On the day of admission she felt lightheaded, blurry vision, veering to the sides of the aisle and collapsed, her mother witnessed some of the event but only after she fell; patients eyes were open and mouth was moving, arms were tonically flexed and asymmetric and legs were hypertonic with repeated relaxation and tonic stiffening  Again she did not regain consciousness until she was in the ambulance  She had a history of one febrile seizure, a paternal great grandmother with seizures, head CT scan found ventriculomegaly  Patients history  June 2016, Ms Cunningham recalled that she was at work and the next thing she remembered was people standing over her, she did not remember what happened before other than feeling lightheaded and blurry vision  She denied feeling exhausted, new medications, drugs, illness/fevers, and there were no changes in her usual habits  August 2016, again she does not recall much other than being told she had a seizure for less minute  Her mother reported that she did see the end of the seizure; initially Ms Joaquina Barillas had her the arms and legs extended outward, then arms were flexed inward, convulsion lasted about 30 minute, and she was unresponsive for about 5 minutes but she was lethargic for about 15-20 minutes afterwards  Her mother had briefly turned away and did not witness the very start of the seizure  Again, there were no alcohol, drugs, medications, or illness experienced by Ms Cunningham  She denies any history of myoclonus, staring spells, automatisms, unexplained hyperkinetic behaviors, olfactory / gustatory hallucinations, epigastric rising events, boogie vu events, visual hallucinations, unexplained nocturnal enuresis, or nocturnal tongue biting    At 17 months, she had a cold with a super high temperature, she had a seizure, deemed to have been a simple febrile seizure  She reports that since she has been on levetiracetam, she has been experiencing daily headaches that come and go throughout the day may last for a couple of hours at a time  These are very severe because she does not do well with pain  Headache is best describe as a throbbing sensation for a couple of hours, daily, she does not take any medication for pain because she is not sure if she can  There is no visual changes, no loss of vision, or changes in refraction  Summary 2017  Switched LVT to  [employer], better tolerated, over the course of 2017, OXC was escalated for recurrent visual distortions of the left side, circles, blurry vision (sensation of visual obscuration lasting for 2 minutes, left side swirls, if severe enough that she would temporarily not see clearly on the left visual field), lasting minutes without altered awareness or loss of consciousness  There is a headache that follows for about half an hour  These visual distortions have been sporadic once 1-5 months  We had tried gabapentin in the Spring/summer 2017 but she stopped taking it because of side effects / headaches (horrible, feeling like everything was in slow motion)  Sometimes these visual distortions are triggered by stress at work  She works part time as a  4-5 hours  I wrote a letter on her behalf to get a 10-15 minutes break every 2 hours  She reports issues with anxiety, mostly as it involves the possibility of a recurrent seizure  She feels like she has been looking over her shoulder because of her epilepsy  She tried [employer] but found it useless  She did get her 's license back with [employer] report of occasional visual seizures      Special Features  Status epilepticus: No  Self Injury Seizures: No  Precipitating Factors: None    Epilepsy Risk Factors:  Abnormal pregnancy: No  Abnormal birth/: No  Abnormal Development: walked after a year, slower with language  Febrile seizures, simple: Yes  Febrile seizures, complex: No  CNS infection: No  Mental retardation: No  Cerebral palsy: No  Head injury (moderate/severe): No  CNS neoplasm: No  CNS malformation: Yes - MRI brain found ventriculomegaly, abnormal splenium of corpus callosum, and subependymal heterotopia  Neurosurgical procedure: No  Stroke: No  Alcohol abuse: No  Drug abuse: No  Family history Sz/epilepsy: No    Prior AEDs:  Levetiracetam (headaches), oxcarbazepine, gabapentin (side effect)    Prior workup:      Imagin2016   MRI brain  Bilateral subependymal heterotopias in the region of the atria of the lateral ventricles with bilateral colpocephaly  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  Solitary nonspecific FLAIR hyperintensity subcortical white matter right frontal lobe    2017  Stable colpocephalic appearance to the ventricles, right greater than left, bowing / stretching of the posterior corpus callosum  Subtle foci nodular heterotopia along atria of both lateral ventricles more apparent on the right  Tiny focus of increased signal in the white matter of the right frontal horn is less apparent  Hippocampi are somewhat dysmorphic bilaterally      EEGs:  2016  Intermittent focal slowing over the right posterior temporal region but limited to the T6 electrode, consider repeating the study    2016  Photic driving is lateralized to the left; thus, possibly relative neuronal dysfunction in the right posterior region      Labs:  Component      Latest Ref Rng & Units 2017           9:40 AM   Glucose      65 - 99 mg/dL 80   BUN      7 - 25 mg/dL 11   Creatinine      0 50 - 1 10 mg/dL 0 82   EGFR-AMERICAN CALC      > OR = 60 mL/min/1 73m2 102   AAGFR      > OR = 60 mL/min/1 73m2 118   BUN/CREA Ratio      6 - 22 (calc) NOT APPLICABLE   Sodium      135 - 146 mmol/L 139   Potassium      3 5 - 5 3 mmol/L 4 3   Chloride      98 - 110 mmol/L 105 CO2      20 - 31 mmol/L 28   Calcium      8 6 - 10 2 mg/dL 9 4   10-HYDROXYCARBAZEPINE (HISTORICAL)      8 0 - 35 0 mcg/mL 37 6 (H)       General exam   Blood pressure 138/76, pulse 88, height 5' 3" (1 6 m), weight 54 6 kg (120 lb 6 4 oz)    Appearance: normally developed, appears well  Carotids: n/a  Cardiovascular: regular rate and rhythm and normal heart sounds  Pulmonary: clear to auscultation    HEENT: anicteric and moist mucus membranes / oral cavity   Fundoscopy: not assessed    Mental status  Orientation: alert and oriented to name, place, time  Fund of Knowledge: intact   Attention and Concentration: intact  Current and Remote Memory:intact  Language: no aphasia and spontaneous speech is normal    Cranial Nerves  CN 1: not tested  CN 2: pupils equal round reactive to direct and consenual light   CN 3, 4, 6: EOMI, no nystagmus  CN 5:not assessed  CN 7:muscles of facial expression are symmetric  CN 8:not assessed  CN 9, 10:no dysarthria present  CN 11:symmetric strength of sternocleidomastoid and trapezius muscles  CN 12:tongue is midline    Motor:  Bulk, Tone: normal bulk, normal tone  Pronation: no pronator drift  Strength: symmetric strength in arms and legs  Abnormal movements: no abnormal movements are present    Sensory:  Lighttouch: intact in all limbs  Romberg:not assessed    Coordination:  FNF:FNF bilaterally intact  ERICA:intact  FFM:intact  Gait/Station:normal gait and normal tandem gait    Reflexes:  not assessed    Past Medical/Surgical History:  Patient Active Problem List   Diagnosis    Anxiety    Congenital cerebral ventriculomegaly ([address] )    Localization-related focal epilepsy with complex partial seizures ([address] )    Mixed emotional features as adjustment reaction    Periventricular heterotopia (HCC)    Headache, unspecified headache type    Visual distortions     Past Medical History:   Diagnosis Date    Cerebral ventriculomegaly 8/25/2016    Seizures ([address] )     Syncopal episodes History reviewed  No pertinent surgical history  Past Psychiatric History:  Depression: No  Anxiety: Yes  Psychosis: No    Medications:    Current Outpatient Prescriptions:     OXcarbazepine (TRILEPTAL) 600 mg tablet, Take 2 tablets (1,200 mg total) by mouth every 12 (twelve) hours Please discontinue 150mg and 300mg tabs  , Disp: 120 tablet, Rfl: 5    Allergies:  No Known Allergies    Family history:  Family History   Problem Relation Age of Onset    No Known Problems Mother     No Known Problems Father     No Known Problems Brother      Paternal Brandon Ha Grandmother started to have seizures later in life  No developmental issues  Parents are healthy    Social History  Living situation:  lives with parents  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, works in retail; 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[address] [address] [employer]); sometimes she needs to use a ladder  She has been feeling stressed out, works on a computer for a couple of hours  Driving:  active license  Tobacco:    History   Smoking Status    Never Smoker   Smokeless Tobacco    Never Used     Alcohol:    History   Alcohol Use No       Drugs:    History   Drug Use No     Review of Systems  A review of at least 12 organ/systems was obtained by the medical assistant and reviewed by me, including additional positives/negatives:    Eyes: Negative  Blurred vision   Respiratory: Negative  Cardiovascular: Negative  Gastrointestinal: Positive for nausea  Endocrine: Negative  Genitourinary: Negative  Musculoskeletal: Negative  Skin: Negative  Allergic/Immunologic: Negative  Neurological: Positive for headaches  Balance problems   Hematological: Negative  Psychiatric/Behavioral: The patient is nervous/anxious           Depression     Decision making was of high-complexity due to the patient's high risk condition (seizures), psychiatric and neuropsychological comorbidities, behavioral problems, memory and cognitive problems and medication side effects  The total amount of time spent with the patient was 50 minutes  More than 50% of this time was devoted to counseling and coordination of care  Issues addressed during this clinic visit included overall management, medication counseling or monitoring (including adverse effects, side effects and risks of antiepileptic medications)     Start time: 12PM  End time: 12:50PM

## 2018-03-20 PROBLEM — R51.9 HEADACHE, UNSPECIFIED HEADACHE TYPE: Status: ACTIVE | Noted: 2018-03-20

## 2018-03-20 PROBLEM — H53.19 VISUAL DISTORTIONS: Status: ACTIVE | Noted: 2018-03-20

## 2018-03-26 ENCOUNTER — TELEPHONE (OUTPATIENT)
Dept: NEUROLOGY | Facility: CLINIC | Age: 23
End: 2018-03-26

## 2018-03-26 DIAGNOSIS — G40.209 LOCALIZATION-RELATED FOCAL EPILEPSY WITH COMPLEX PARTIAL SEIZURES (HCC): Primary | ICD-10-CM

## 2018-03-26 RX ORDER — PREGABALIN 75 MG/1
75 CAPSULE ORAL 2 TIMES DAILY
Qty: 60 CAPSULE | Refills: 1 | OUTPATIENT
Start: 2018-03-26 | End: 2018-05-08 | Stop reason: SDUPTHER

## 2018-03-26 NOTE — TELEPHONE ENCOUNTER
Pt called the office to report 3/19 her OXC was increased 1200mg bid  Pt states that night she began to feel off balance and "in a fog"  Pt states since then it has not improved and then Friday and Saturday morning she began to feel very off balance  Pt states she feels she is leaning toward her left side when walking and she feels she may fall when walking  Pt denies any falls  Pt denies any missed doses of meds, no new medications (including OTC), pt denies any sz activity, pt denies any recent auras  Pt is sleeping 9hrs/night and denies any sleep deprivation  Pt states she is due to go for lab work by 4/16  Pt states there was mention of adding Lyrica if OXC did not work- she is wondering if it is time to do this?  Please advise

## 2018-03-26 NOTE — TELEPHONE ENCOUNTER
· Decrease oxcarbazepine to prior tolerated dose (I believe this would be 900 mg AM / 1200 mg PM)  · As long as side effects resolve, in 5 days start Lyrica 75 mg bid  (Please call this in)  · Schedule REEG anticipating possible EMU admission if events continue  · Call us next week to discuss possible EMU admission and possible further increase in Lyrica dose

## 2018-03-26 NOTE — TELEPHONE ENCOUNTER
Spoke with patient  She was initially hesitant about scheduling the EMU admission  After reviewing the EMU and her questions she is agreeable and was scheduled for 4/23/18  She has not had a routine EEG in the last 12 months and is aware she will require this due to insurance purposes  Please enter order for scheduling  Tina Elizondo (PACS) made aware

## 2018-03-27 ENCOUNTER — TELEPHONE (OUTPATIENT)
Dept: NEUROLOGY | Facility: CLINIC | Age: 23
End: 2018-03-27

## 2018-03-27 NOTE — TELEPHONE ENCOUNTER
Patient made aware and agreeable to plan  Lyrica called in  I do have patient scheduled for EMU admission  To verify, if her symptoms resolve with the addition of lyrica, she will no longer need this? She will call us back next week with update

## 2018-03-27 NOTE — TELEPHONE ENCOUNTER
I didn't realize EMU was already scheduled  If events resolve for an extended period then need for admission should be reviewed with Dr Aaron Gould  If admission is happening in the next couple weeks she should come as scheduled

## 2018-03-28 NOTE — TELEPHONE ENCOUNTER
Let's just get the routine EEG first     I rather have her try out Lyrica, determine the frequency of her visual focal seizures (needs to be at least twice a month) before we have her admitted to the EMU for seizure characterization

## 2018-03-28 NOTE — TELEPHONE ENCOUNTER
Per conversation with Dr Lana Murillo, will keep EMU admission schedule  I did notice the patient's rEEG has been schedule for the same day as the admission  I spoke with patient and made her aware that we will need to either move the rEEG at least a week earlier or push out the EMU admission by a week  She will look at her schedule and call me back

## 2018-03-29 ENCOUNTER — TELEPHONE (OUTPATIENT)
Dept: NEUROLOGY | Facility: CLINIC | Age: 23
End: 2018-03-29

## 2018-03-29 NOTE — TELEPHONE ENCOUNTER
Please start prior authorization process, patient failed a number of AEDs due to side effects and she failed gabapentin over the previous summer

## 2018-03-29 NOTE — TELEPHONE ENCOUNTER
Pharmacy calls, Lyrica denied per insurance, it is not formulary they are unclear about a PA, do you want to rx a new med or should we intiate PA online?      Please advise

## 2018-04-13 LAB
10OH-CARBAZEPINE SERPL-MCNC: 39.2 MCG/ML (ref 8–35)
ALBUMIN SERPL-MCNC: 5 G/DL (ref 3.6–5.1)
ALBUMIN/GLOB SERPL: 1.9 (CALC) (ref 1–2.5)
ALP SERPL-CCNC: 60 U/L (ref 33–115)
ALT SERPL-CCNC: 11 U/L (ref 6–29)
AST SERPL-CCNC: 15 U/L (ref 10–30)
BASOPHILS # BLD AUTO: 18 CELLS/UL (ref 0–200)
BASOPHILS NFR BLD AUTO: 0.4 %
BILIRUB SERPL-MCNC: 0.3 MG/DL (ref 0.2–1.2)
BUN SERPL-MCNC: 13 MG/DL (ref 7–25)
BUN/CREAT SERPL: NORMAL (CALC) (ref 6–22)
CALCIUM SERPL-MCNC: 9.5 MG/DL (ref 8.6–10.2)
CHLORIDE SERPL-SCNC: 103 MMOL/L (ref 98–110)
CO2 SERPL-SCNC: 31 MMOL/L (ref 20–31)
CREAT SERPL-MCNC: 0.93 MG/DL (ref 0.5–1.1)
EOSINOPHIL # BLD AUTO: 92 CELLS/UL (ref 15–500)
EOSINOPHIL NFR BLD AUTO: 2 %
ERYTHROCYTE [DISTWIDTH] IN BLOOD BY AUTOMATED COUNT: 12 % (ref 11–15)
GLOBULIN SER CALC-MCNC: 2.6 G/DL (CALC) (ref 1.9–3.7)
GLUCOSE SERPL-MCNC: 79 MG/DL (ref 65–99)
HCT VFR BLD AUTO: 40.4 % (ref 35–45)
HGB BLD-MCNC: 13.3 G/DL (ref 11.7–15.5)
LYMPHOCYTES # BLD AUTO: 1343 CELLS/UL (ref 850–3900)
LYMPHOCYTES NFR BLD AUTO: 29.2 %
MCH RBC QN AUTO: 30.3 PG (ref 27–33)
MCHC RBC AUTO-ENTMCNC: 32.9 G/DL (ref 32–36)
MCV RBC AUTO: 92 FL (ref 80–100)
MONOCYTES # BLD AUTO: 437 CELLS/UL (ref 200–950)
MONOCYTES NFR BLD AUTO: 9.5 %
NEUTROPHILS # BLD AUTO: 2709 CELLS/UL (ref 1500–7800)
NEUTROPHILS NFR BLD AUTO: 58.9 %
PLATELET # BLD AUTO: 245 THOUSAND/UL (ref 140–400)
PMV BLD REES-ECKER: 10.1 FL (ref 7.5–12.5)
POTASSIUM SERPL-SCNC: 4.4 MMOL/L (ref 3.5–5.3)
PROT SERPL-MCNC: 7.6 G/DL (ref 6.1–8.1)
RBC # BLD AUTO: 4.39 MILLION/UL (ref 3.8–5.1)
SL AMB EGFR AFRICAN AMERICAN: 101 ML/MIN/1.73M2
SL AMB EGFR NON AFRICAN AMERICAN: 87 ML/MIN/1.73M2
SODIUM SERPL-SCNC: 139 MMOL/L (ref 135–146)
WBC # BLD AUTO: 4.6 THOUSAND/UL (ref 3.8–10.8)

## 2018-04-23 ENCOUNTER — HOSPITAL ENCOUNTER (OUTPATIENT)
Dept: NEUROLOGY | Facility: HOSPITAL | Age: 23
Discharge: HOME | End: 2018-04-23
Attending: PSYCHIATRY & NEUROLOGY
Payer: COMMERCIAL

## 2018-04-23 DIAGNOSIS — G40.209 LOCALIZATION-RELATED FOCAL EPILEPSY WITH COMPLEX PARTIAL SEIZURES (HCC): ICD-10-CM

## 2018-04-23 PROCEDURE — 95819 EEG AWAKE AND ASLEEP: CPT

## 2018-04-23 PROCEDURE — 95819 EEG AWAKE AND ASLEEP: CPT | Performed by: PSYCHIATRY & NEUROLOGY

## 2018-05-07 ENCOUNTER — TELEPHONE (OUTPATIENT)
Dept: NEUROLOGY | Facility: CLINIC | Age: 23
End: 2018-05-07

## 2018-05-07 DIAGNOSIS — G40.209 LOCALIZATION-RELATED FOCAL EPILEPSY WITH COMPLEX PARTIAL SEIZURES (HCC): ICD-10-CM

## 2018-05-08 RX ORDER — OXCARBAZEPINE 600 MG/1
TABLET, FILM COATED ORAL
Qty: 90 TABLET | Refills: 5 | Status: SHIPPED | OUTPATIENT
Start: 2018-05-08 | End: 2018-10-30 | Stop reason: SDUPTHER

## 2018-05-08 RX ORDER — PREGABALIN 150 MG/1
150 CAPSULE ORAL 2 TIMES DAILY
Qty: 60 CAPSULE | Refills: 5 | Status: SHIPPED | OUTPATIENT
Start: 2018-05-08 | End: 2018-07-30 | Stop reason: SDUPTHER

## 2018-05-08 NOTE — TELEPHONE ENCOUNTER
EEG showed right posterior temporal focal abnormality with potential to cause seizures  EEG was necessary if we are planning an EMU admission to characterize her visual seizures (make sure it is an epileptic seizure coming from the visual region versus nonepileptic visual distortions)  We decided to hold off EMU until she fails Lyrica    I will change her current prescriptions to: Oxcarbazepine 600mg tab 1 5 tab BID  Lyrica 150mg 1 tab BID  She may need a new Prior authorization of Lyrica 150mg tabs instead of the 75mg tabs

## 2018-05-08 NOTE — TELEPHONE ENCOUNTER
Patient Currently taking:   Lyrica 75mg twice a day  Oxcarbazepine 900mg in AM and 1200mg in PM    States she's feeling, "normal " Still having balance issues, advised her to decrease oxcarbazepine to 900mg BID per below recommendation  Had focal visual seizures/auras last week after starting Lyrica  Advised to increase Lyrica to 150mg twice a day  Patient asking for results from her EEG  States Thursday she is off work, best day to reach her      [phone]

## 2018-05-08 NOTE — TELEPHONE ENCOUNTER
Patient made aware and agreeable  She will call the pharmacy and let us know if there is any issue picking up the medication

## 2018-05-08 NOTE — TELEPHONE ENCOUNTER
I wanted to see how she does with Lyrica first   Is she on Lyrica 75mg twice a day? She had side effects on oxcarbazepine 1200 BID  She was to have decreased to 900mg in AM and 1200mg in PM    How is she feeling? If she still has balance and cognitive issues she can decrease oxcarbazepine to 900mg BID  Has she had any of the focal visual seizures/auras since starting Lyrica  If so then increase Lyrica to 150mg twice a day

## 2018-07-30 ENCOUNTER — OFFICE VISIT (OUTPATIENT)
Dept: NEUROLOGY | Facility: CLINIC | Age: 23
End: 2018-07-30
Payer: COMMERCIAL

## 2018-07-30 VITALS
HEIGHT: 63 IN | SYSTOLIC BLOOD PRESSURE: 144 MMHG | DIASTOLIC BLOOD PRESSURE: 82 MMHG | HEART RATE: 84 BPM | BODY MASS INDEX: 22.64 KG/M2 | RESPIRATION RATE: 18 BRPM | WEIGHT: 127.8 LBS

## 2018-07-30 DIAGNOSIS — G40.209 LOCALIZATION-RELATED FOCAL EPILEPSY WITH COMPLEX PARTIAL SEIZURES (HCC): Primary | ICD-10-CM

## 2018-07-30 DIAGNOSIS — F41.9 ANXIETY: ICD-10-CM

## 2018-07-30 DIAGNOSIS — Q07.8: ICD-10-CM

## 2018-07-30 DIAGNOSIS — F32.9 REACTIVE DEPRESSION: ICD-10-CM

## 2018-07-30 PROCEDURE — 99215 OFFICE O/P EST HI 40 MIN: CPT | Performed by: PSYCHIATRY & NEUROLOGY

## 2018-07-30 RX ORDER — PREGABALIN 75 MG/1
CAPSULE ORAL
Qty: 30 CAPSULE | Refills: 0 | Status: SHIPPED | OUTPATIENT
Start: 2018-07-30 | End: 2018-08-22 | Stop reason: SDUPTHER

## 2018-07-30 RX ORDER — PREGABALIN 225 MG/1
CAPSULE ORAL
Qty: 60 CAPSULE | Refills: 5 | Status: SHIPPED | OUTPATIENT
Start: 2018-07-30 | End: 2018-08-22 | Stop reason: ALTCHOICE

## 2018-07-30 NOTE — LETTER
2018     Cesar Kenny, [facility]  [address] [address]    Patient: James Gabriel   YOB: 1995   Date of Visit: 2018       Dear Dr Mathilda Gitelman: Thank you for referring Raoul Henley to me for evaluation  Below are my notes for this consultation  If you have questions, please do not hesitate to call me  I look forward to following your patient along with you  Sincerely,        German Cook MD        CC: No Recipients  German Cook MD  2018  5:42 PM  Sign at close encounter  [address]  Name:  James Gabriel   : 1995   Visit Type: follow-up  Referring MD / PCP:  Cesar Kenny DO     Assessment:  Ms Alexis Duran is a 21 y o  woman with localization related, 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)  She has had at least one conclusive witnessed seizure that was likely secondary generalized  She continues to have occasional recurrent episodes of visual distortions of the left visual field that are concerning for focal seizures without altered awareness (based on the location of her heterotopia, seizures may involve visual pathways)  She is tolerating high dose oxcarbazepine without clear side effects  It is unfortunate that Ms Adair Mcdaniel continues to have these episodes of left sided visual distortions without altered awareness, presumed to be focal visual seizures from the right occipital region  The differential diagnosis does include migraine headache with aura  At this point, we are using anticonvulsants to prevent the focal seizures  She had persistent visual auras without convulsions since she has been on oxcarbazepine 600mg twice a day (we can consider reducing her oxcarbazepine dose if the patient is experiencing too many side effects)  We will try to maximize Lyrica before transitioning her to zonisamide or lamotrigine        Her MRI brain study is stable, no worsening of ventriculomegaly and the right frontal FLAIR anomaly is stable  We will hold off on EMU study for seizure characterization (and presurgical evaluation, but this is going to be complicated if her seizures are coming from primary visual cortex)  At this point, Ms Kurtis Velázquez is not emotionally prepared for an inpatient study  The EMU study may help distinguish between focal seizures and migraines with aura  Ms Friass depression and anxiety is worsening in part due to uncontrolled visual seizures  She was very emotional during this office visit; at this point, I am recommending that she is evaluated by a psychologist for psychotherapy management; she may be having a reaction to the difficulty in controlling her focal visual seizures  She is more willing for psychology referral (she remembered being evaluated at [address] [place] [address] prior to the onset of her seizures)  Lyrica has been used off-label for anxiety symptoms  Plan:   1 - continue with oxcarbazepine 600mg tab take one and a half tab twice a day  2 - increasing dose of Lyrica  Take one 150mg capsule in the morning and one 225mg capsule in the evening for 30 days; Then take one 150mg capsule with one 75mg capsule in the morning (makes 225mg) and one 225mg capsule in the evening until no more 150mg capsules, then take one 225mg capsule twice a day  3 - check CBC, CMP and oxcarbazepine level in 2 weeks  4 - call the office if you are experiencing dizziness or cognitive issues with higher doses of Lyrica; will try a dose reduction of Oxcarbazepine to 750mg twice a day (may also decrease Oxcarbazepine if levels are >35)  5 - if nausea, swelling, or moods are worsening with the addition of Lyrica will need to wean you off of this medication  Due to your worsening anxiety and depression; you need to see a therapist (possibly a psychiatrist in the future)    6 - referral to psychology try [facility] but if the appointment is more than 1 month away please call   [facility] [address]   Phone: [phone]  7 - follow-up in 1 month with AP and with Dr Kun Onofre in 3 months  Will determine if she cannot tolerate Lyrica then will start lamotrigine titration over the course of 8-10 weeks with simultaneous weaning of Lyrica  8 - hold off on the referral to the EMU for seizure characterization and presurgical evaluation    Problem List Items Addressed This Visit        Nervous and Auditory    Localization-related focal epilepsy with complex partial seizures ([address] ) - Primary    Relevant Medications    pregabalin (LYRICA) 225 MG capsule    pregabalin (LYRICA) 75 mg capsule    Other Relevant Orders    Oxcarbazepine level    Comprehensive metabolic panel    CBC    Periventricular heterotopia ([facility] [address] )       Other    Anxiety    Relevant Orders    Ambulatory referral to Psychology    Reactive depression    Relevant Orders    Ambulatory referral to Psychology        Chief Complaint:    Chief Complaint     Seizures        HPI:    Christiane Baird is a 21 y o  right handed female here for follow-up evaluation of focal seizures, visual distortions with headache and worsening of her depression and anxiety  Interval history 7/30/2018  Patient tried [address] [address]; but she developed cognitive difficulties and imbalance  She started Lyrica  EMU was put on hold until she had a trial of another AED  She was tolerating Lyrica 150mg twice a day, so OXC was reduced to 900mg BID  She continues to have occasional visual distortions, the last one was yesterday  At this point, she estimates twice a month  These are circles over the left side of her visual field, lasting 1 minute in duration, followed by a headache lasting 1 hour  She feels that these are happening more frequently  She denies cognitive impairment, staring spell, unresponsiveness, and convulsive activity    She is driving but she has avoided the highways due to her seizures  She has enough of a warning to stop the car or pull over on local streets  She has occasional nausea symptom, cannot identify if related to medication  She denies any issues with cognition but notices some dizziness  She reports that she has been struggling with anxiety and depression  She feels hopeless, helpless, lack of motivation  She denies suicidal ideations or intent at this point  She gets anxious when she is under a lot of stress; there is no palpitations or heart pounding sensations  AED/side effects/compliance:  Oxcarbazepine 900mg twice a day  Lyrica 150mg twice a month  Every now and then she feels dizziness  No missed doses    Event/Seizure semiology:  1  Lightheaded blurry vision, loss of consciousness, convulsion  (Last one 8/24/2016)  2  Blurry vision, circles in a part of her visual field, followed by a period of headache, no clear confusion or disorientation (described in January 2017 visit) - recurs 2-3 times a month    Prior Epilepsy History:  Initial intake epilepsy history 9/12/2016  Review of hospital records  Ms Sanjiv Haywood was admitted from 8/24-8/26/2016 to [facility] after a witnessed seizure  She had an MRI brain study that showed cortical malformations including heterotopias, she was subsequently started on levetiracetam 500mg twice a day  Dr Martir Montes did the initial consultation; she reported that her first seizure was in June 2016, onset of lightheadedness, blurry vision, no memory of passing out but she collapsed, the next thing she remembered was waking up in the ambulance  On the day of admission she felt lightheaded, blurry vision, veering to the sides of the aisle and collapsed, her mother witnessed some of the event but only after she fell; patients eyes were open and mouth was moving, arms were tonically flexed and asymmetric and legs were hypertonic with repeated relaxation and tonic stiffening    Again she did not regain consciousness until she was in the ambulance  She had a history of one febrile seizure, a paternal great grandmother with seizures, head CT scan found ventriculomegaly  Patients history  June 2016, Ms Cunningham recalled that she was at work and the next thing she remembered was people standing over her, she did not remember what happened before other than feeling lightheaded and blurry vision  She denied feeling exhausted, new medications, drugs, illness/fevers, and there were no changes in her usual habits  August 2016, again she does not recall much other than being told she had a seizure for less minute  Her mother reported that she did see the end of the seizure; initially Ms Peters had her the arms and legs extended outward, then arms were flexed inward, convulsion lasted about 30 minute, and she was unresponsive for about 5 minutes but she was lethargic for about 15-20 minutes afterwards  Her mother had briefly turned away and did not witness the very start of the seizure  Again, there were no alcohol, drugs, medications, or illness experienced by Ms Cunningham  She denies any history of myoclonus, staring spells, automatisms, unexplained hyperkinetic behaviors, olfactory / gustatory hallucinations, epigastric rising events, boogie vu events, visual hallucinations, unexplained nocturnal enuresis, or nocturnal tongue biting  At 17 months, she had a cold with a super high temperature, she had a seizure, deemed to have been a simple febrile seizure  LEV was associated with daily headaches that come and go throughout the day may last for a couple of hours at a time  These are very severe because she does not do well with pain  Headache is best describe as a throbbing sensation for a couple of hours, daily, she does not take any medication for pain because she is not sure if she can  There is no visual changes, no loss of vision, or changes in refraction       Summary 2017  Switched LVT to OXC, better tolerated, over the course of 2017, OXC was escalated for recurrent visual distortions of the left side, circles, blurry vision (sensation of visual obscuration lasting for 2 minutes, left side swirls, if severe enough that she would temporarily not see clearly on the left visual field), lasting minutes without altered awareness or loss of consciousness  There is a headache that follows for about half an hour  These visual distortions have been sporadic once 1-5 months  We had tried gabapentin in the Spring/summer 2017 but she stopped taking it because of side effects / headaches (horrible, feeling like everything was in slow motion)  Sometimes these visual distortions are triggered by stress at work  She works part time as a  4-5 hours  I wrote a letter on her behalf to get a 10-15 minutes break every 2 hours  She reports issues with anxiety, mostly as it involves the possibility of a recurrent seizure  She feels like she has been looking over her shoulder because of her epilepsy  She tried [employer] but found it useless  She did get her 's license back with [employer] report of occasional visual seizures  Interval History 3/19/2018  -1200  She was previously seen on oxcarbazepine 900mg twice a day, increased due to recurrent visual symptoms  These include visual disturbances of circles on the left visual field followed by a headache, no disorientation or confusion  She reports lastone 3/11/2018, another one on 2/2/2018  She was instructed to increase oxcarbazepine to 900mg in AM and 1200mg in PM   She is tolerating the dose without significant side effects  She feels that she has less headaches and more tolerable  In the past headaches would last half a day, she would take Advil up to 3 tabs  These were not frequent (less than once a month headaches)  She has episodes of dizzy spells when she is at work, one time was related to her focal seizures    She works as a , staring at the computer all day, for 4-4 5 hours with 15 minutes  She denies alcohol, illness, or missing doses of oxcarbazepine  She denies convulsions, loss of consciousness, altered awareness, or appearance of zoning out or disorientation      Special Features  Status epilepticus: No  Self Injury Seizures: No  Precipitating Factors: None    Epilepsy Risk Factors:  Abnormal pregnancy: No  Abnormal birth/: No  Abnormal Development: walked after a year, slower with language  Febrile seizures, simple: Yes  Febrile seizures, complex: No  CNS infection: No  Mental retardation: No  Cerebral palsy: No  Head injury (moderate/severe): No  CNS neoplasm: No  CNS malformation: Yes  MRI brain found ventriculomegaly, abnormal splenium of corpus callosum, and subependymal heterotopia  Neurosurgical procedure: No  Stroke: No  Alcohol abuse: No  Drug abuse: No  Family history Sz/epilepsy: No    Prior AEDs:  Levetiracetam (headaches), oxcarbazepine, gabapentin (side effect), pregabalin (continues to have visual auras)    Prior workup:      Imagin2016   MRI brain  Bilateral subependymal heterotopias in the region of the atria of the lateral ventricles with bilateral colpocephaly  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  Solitary nonspecific FLAIR hyperintensity subcortical white matter right frontal lobe    2017  MRI brain  Stable colpocephalic appearance to the ventricles, right greater than left, bowing / stretching of the posterior corpus callosum  Subtle foci nodular heterotopia along atria of both lateral ventricles more apparent on the right  Tiny focus of increased signal in the white matter of the right frontal horn is less apparent  Hippocampi are somewhat dysmorphic bilaterally    EEGs:  2016  Intermittent focal slowing over the right posterior temporal region but limited to the T6 electrode, consider repeating the study    2016  Photic driving is lateralized to the left; thus, possibly relative neuronal dysfunction in the right posterior region  4/23/2018  Frequent, right posterior temporal delta slowing and occipital sharp activity during 10 Hz photic stimulation  Labs:  Component      Latest Ref Rng & Units 4/10/2018   SL AMB LAB WHITE BLOOD CELL COUNT      3 8 - 10 8 Thousand/uL 4 6   SL AMB LAB RED BLOOD CELLS      3 80 - 5 10 Million/uL 4 39   Hemoglobin      11 7 - 15 5 g/dL 13 3   SL AMB HEMATOCRIT      35 0 - 45 0 % 40 4   SL AMB PLATELET COUNT      017 - 400 Thousand/uL 245   SL AMB GLUCOSE      65 - 99 mg/dL 79   BUN      7 - 25 mg/dL 13   Creatinine      0 50 - 1 10 mg/dL 0 93   eGFR Non       > OR = 60 mL/min/1 73m2 87   SL AMB EGFR       > OR = 60 mL/min/1 73m2 101   SL AMB BUN/CREATININE RATIO      6 - 22 (calc) NOT APPLICABLE   SL AMB SODIUM      135 - 146 mmol/L 139   SL AMB POTASSIUM      3 5 - 5 3 mmol/L 4 4   SL AMB CHLORIDE      98 - 110 mmol/L 103   SL AMB CARBON DIOXIDE      20 - 31 mmol/L 31   SL AMB CALCIUM      8 6 - 10 2 mg/dL 9 5   SL AMB PROTEIN, TOTAL      6 1 - 8 1 g/dL 7 6   Serum Albumin      3 6 - 5 1 g/dL 5 0   SL AMB GLOBULIN      1 9 - 3 7 g/dL (calc) 2 6   SL AMB ALBUMIN/GLOBULIN RATIO      1 0 - 2 5 (calc) 1 9   SL AMB BILIRUBIN, TOTAL      0 2 - 1 2 mg/dL 0 3   SL AMB ALKALINE PHOSPHATASE      33 - 115 U/L 60   SL AMB AST      10 - 30 U/L 15   SL AMB ALT      6 - 29 U/L 11   10-HYDROXYCARBAZEPINE (HISTORICAL)      8 0 - 35 0 mcg/mL 39 2 (H)     General exam   Blood pressure 144/82, pulse 84, resp  rate 18, height 5' 3" (1 6 m), weight 58 kg (127 lb 12 8 oz)    Appearance: normally developed, appears well  Carotids: n/a  Cardiovascular: regular rate and rhythm and normal heart sounds  Pulmonary: clear to auscultation   Behavior: Patient started to cry and become emotional during the visit; when she talked about how her anxiety has been worsening    HEENT: anicteric and moist mucus membranes / oral cavity Fundoscopy: not assessed    Mental status  Orientation: alert and oriented to name, place, time  Fund of Knowledge: intact   Attention and Concentration: intact  Current and Remote Memory:intact  Language: normal, spontaneous speech is normal and comprehension is intact    Cranial Nerves  CN 1: not tested  CN 2: pupils equal round reactive to direct and consenual light   CN 3, 4, 6: EOMI, no nystagmus  CN 5:not assessed  CN 7:muscles of facial expression are symmetric  CN 8:not assessed  CN 9, 10:no dysarthria present  CN 11:not assessed  CN 12:tongue is midline    Motor:  Bulk, Tone: normal bulk, normal tone  Pronation: no pronator drift  Strength: symmetric strength in arms and legs  Abnormal movements: no abnormal movements are present    Sensory:  Lighttouch: intact in all limbs  Romberg:not assessed    Coordination:  FNF:FNF bilaterally intact  ERICA:intact  FFM:intact  Gait/Station:normal gait and normal tandem gait    Reflexes:  not assessed    Past Medical/Surgical History:  Patient Active Problem List   Diagnosis    Anxiety    Congenital cerebral ventriculomegaly ([facility] [address] )    Localization-related focal epilepsy with complex partial seizures ([facility] [address] )    Reactive depression    Periventricular heterotopia (HCC)    Headache, unspecified headache type    Visual distortions     Past Medical History:   Diagnosis Date    Cerebral ventriculomegaly 8/25/2016    Seizures ([facility] [address] )     Syncopal episodes      History reviewed  No pertinent surgical history      Past Psychiatric History:  Depression: Yes  Anxiety: Yes  Psychosis: No    Medications:    Current Outpatient Prescriptions:     OXcarbazepine (TRILEPTAL) 600 mg tablet, Take one and a half tab twice a day, Disp: 90 tablet, Rfl: 5    pregabalin (LYRICA) 225 MG capsule, Take one tab of the 150mg in AM and one 225mg in PM for 1 month, then increase to 225mg twice a day, Disp: 60 capsule, Rfl: 5    pregabalin (LYRICA) 75 mg capsule, After 1 month of higher dose, take one 150mg tab with one 75mg tab in the AM and one 225mg tab in PM, Disp: 30 capsule, Rfl: 0    Allergies:  No Known Allergies    Family history:  Family History   Problem Relation Age of Onset    No Known Problems Mother     No Known Problems Father     No Known Problems Brother      Paternal Marilin Daly Grandmother started to have seizures later in life  No developmental issues  Parents are healthy    Social History  Living situation:  lives with parents  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, works in retail; 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[address] [address] [employer]); sometimes she needs to use a ladder  She has been feeling stressed out, works on a computer for a couple of hours  Driving:  active license  reports that she has never smoked  She has never used smokeless tobacco  She reports that she does not drink alcohol or use drugs  Review of Systems  A review of at least 12 organ/systems was obtained by the medical assistant and reviewed by me, including additional positives/negatives:   Gastrointestinal: Positive for nausea  Endocrine: Negative  Genitourinary: Negative  Musculoskeletal: Negative  Skin: Negative  Neurological: Positive for syncope and headaches  Hematological: Negative  Psychiatric/Behavioral: The patient is nervous/anxious  Depression     Decision making was of high-complexity due to the patient's high risk condition (seizures), psychiatric and neuropsychological comorbidities, behavioral problems, memory and cognitive problems and medication side effects

## 2018-07-30 NOTE — PROGRESS NOTES
Review of Systems    {LimROS-complete:12047}      Review of Systems   Constitutional: Negative  HENT: Negative  Eyes:        Blurred vision   Respiratory: Negative  Cardiovascular: Negative  Gastrointestinal: Positive for nausea  Endocrine: Negative  Genitourinary: Negative  Musculoskeletal: Negative  Skin: Negative  Neurological: Positive for syncope and headaches  Hematological: Negative  Psychiatric/Behavioral: The patient is nervous/anxious           Depression

## 2018-07-30 NOTE — PROGRESS NOTES
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)  She has had at least one conclusive witnessed seizure that was likely secondary generalized  She continues to have occasional recurrent episodes of visual distortions of the left visual field that are concerning for focal seizures without altered awareness (based on the location of her heterotopia, seizures may involve visual pathways)  She is tolerating high dose oxcarbazepine without clear side effects  It is unfortunate that Ms Peters continues to have these episodes of left sided visual distortions without altered awareness, presumed to be focal visual seizures from the right occipital region  The differential diagnosis does include migraine headache with aura  At this point, we are using anticonvulsants to prevent the focal seizures  She had persistent visual auras without convulsions since she has been on oxcarbazepine 600mg twice a day (we can consider reducing her oxcarbazepine dose if the patient is experiencing too many side effects)  We will try to maximize Lyrica before transitioning her to zonisamide or lamotrigine  Her MRI brain study is stable, no worsening of ventriculomegaly and the right frontal FLAIR anomaly is stable  We will hold off on EMU study for seizure characterization (and presurgical evaluation, but this is going to be complicated if her seizures are coming from primary visual cortex)  At this point, Ms Peters is not emotionally prepared for an inpatient study  The EMU study may help distinguish between focal seizures and migraines with aura  Ms Cunningham's depression and anxiety is worsening in part due to uncontrolled visual seizures    She was very emotional during this office visit; at this point, I am recommending that she is evaluated by a psychologist for psychotherapy management; she may be having a reaction to the difficulty in controlling her focal visual seizures  She is more willing for psychology referral (she remembered being evaluated at [address] [place] [address] prior to the onset of her seizures)  Lyrica has been used off-label for anxiety symptoms  Plan:   1 - continue with oxcarbazepine 600mg tab take one and a half tab twice a day  2 - increasing dose of Lyrica  Take one 150mg capsule in the morning and one 225mg capsule in the evening for 30 days; Then take one 150mg capsule with one 75mg capsule in the morning (makes 225mg) and one 225mg capsule in the evening until no more 150mg capsules, then take one 225mg capsule twice a day  3 - check CBC, CMP and oxcarbazepine level in 2 weeks  4 - call the office if you are experiencing dizziness or cognitive issues with higher doses of Lyrica; will try a dose reduction of Oxcarbazepine to 750mg twice a day (may also decrease Oxcarbazepine if levels are >35)  5 - if nausea, swelling, or moods are worsening with the addition of Lyrica will need to wean you off of this medication  Due to your worsening anxiety and depression; you need to see a therapist (possibly a psychiatrist in the future)  6 - referral to psychology try [facility] but if the appointment is more than 1 month away please call   [facility]  [address] [place] [address]   Phone: [phone]  7 - follow-up in 1 month with AP and with Dr Rosana Jeffries in 3 months  Will determine if she cannot tolerate Lyrica then will start lamotrigine titration over the course of 8-10 weeks with simultaneous weaning of Lyrica    8 - hold off on the referral to the EMU for seizure characterization and presurgical evaluation    Problem List Items Addressed This Visit        Nervous and Auditory    Localization-related focal epilepsy with complex partial seizures ([facility] [address] ) - Primary    Relevant Medications    pregabalin (LYRICA) 225 MG capsule    pregabalin (LYRICA) 75 mg capsule    Other Relevant Orders    Oxcarbazepine level    Comprehensive metabolic panel    CBC    Periventricular heterotopia ([address] )       Other    Anxiety    Relevant Orders    Ambulatory referral to Psychology    Reactive depression    Relevant Orders    Ambulatory referral to Psychology        Chief Complaint:    Chief Complaint     Seizures        HPI:    Sara Bose is a 21 y o  right handed female here for follow-up evaluation of focal seizures, visual distortions with headache and worsening of her depression and anxiety  Interval history 7/30/2018  Patient tried [address] [id number]; but she developed cognitive difficulties and imbalance  She started Lyrica  EMU was put on hold until she had a trial of another AED  She was tolerating Lyrica 150mg twice a day, so OXC was reduced to 900mg BID  She continues to have occasional visual distortions, the last one was yesterday  At this point, she estimates twice a month  These are circles over the left side of her visual field, lasting 1 minute in duration, followed by a headache lasting 1 hour  She feels that these are happening more frequently  She denies cognitive impairment, staring spell, unresponsiveness, and convulsive activity  She is driving but she has avoided the highways due to her seizures  She has enough of a warning to stop the car or pull over on local streets  She has occasional nausea symptom, cannot identify if related to medication  She denies any issues with cognition but notices some dizziness  She reports that she has been struggling with anxiety and depression  She feels hopeless, helpless, lack of motivation  She denies suicidal ideations or intent at this point  She gets anxious when she is under a lot of stress; there is no palpitations or heart pounding sensations  AED/side effects/compliance:  Oxcarbazepine 900mg twice a day  Lyrica 150mg twice a month  Every now and then she feels dizziness  No missed doses    Event/Seizure semiology:  1  Lightheaded blurry vision, loss of consciousness, convulsion  (Last one 8/24/2016)  2  Blurry vision, circles in a part of her visual field, followed by a period of headache, no clear confusion or disorientation (described in January 2017 visit) - recurs 2-3 times a month    Prior Epilepsy History:  Initial intake epilepsy history 9/12/2016  Review of hospital records  Ms Swapna Campos was admitted from 8/24-8/26/2016 to [facility] after a witnessed seizure  She had an MRI brain study that showed cortical malformations including heterotopias, she was subsequently started on levetiracetam 500mg twice a day  Dr Terra Chang did the initial consultation; she reported that her first seizure was in June 2016, onset of lightheadedness, blurry vision, no memory of passing out but she collapsed, the next thing she remembered was waking up in the ambulance  On the day of admission she felt lightheaded, blurry vision, veering to the sides of the aisle and collapsed, her mother witnessed some of the event but only after she fell; patients eyes were open and mouth was moving, arms were tonically flexed and asymmetric and legs were hypertonic with repeated relaxation and tonic stiffening  Again she did not regain consciousness until she was in the ambulance  She had a history of one febrile seizure, a paternal great grandmother with seizures, head CT scan found ventriculomegaly  Patients history  June 2016, Ms Cunningham recalled that she was at work and the next thing she remembered was people standing over her, she did not remember what happened before other than feeling lightheaded and blurry vision  She denied feeling exhausted, new medications, drugs, illness/fevers, and there were no changes in her usual habits    August 2016, again she does not recall much other than being told she had a seizure for less minute  Her mother reported that she did see the end of the seizure; initially Ms Maurice Abraham had her the arms and legs extended outward, then arms were flexed inward, convulsion lasted about 30 minute, and she was unresponsive for about 5 minutes but she was lethargic for about 15-20 minutes afterwards  Her mother had briefly turned away and did not witness the very start of the seizure  Again, there were no alcohol, drugs, medications, or illness experienced by Ms Cunningham  She denies any history of myoclonus, staring spells, automatisms, unexplained hyperkinetic behaviors, olfactory / gustatory hallucinations, epigastric rising events, boogie vu events, visual hallucinations, unexplained nocturnal enuresis, or nocturnal tongue biting  At 17 months, she had a cold with a super high temperature, she had a seizure, deemed to have been a simple febrile seizure  LEV was associated with daily headaches that come and go throughout the day may last for a couple of hours at a time  These are very severe because she does not do well with pain  Headache is best describe as a throbbing sensation for a couple of hours, daily, she does not take any medication for pain because she is not sure if she can  There is no visual changes, no loss of vision, or changes in refraction  Summary 2017  Switched LVT to [facility], better tolerated, over the course of 2017, OXC was escalated for recurrent visual distortions of the left side, circles, blurry vision (sensation of visual obscuration lasting for 2 minutes, left side swirls, if severe enough that she would temporarily not see clearly on the left visual field), lasting minutes without altered awareness or loss of consciousness  There is a headache that follows for about half an hour  These visual distortions have been sporadic once 1-5 months    We had tried gabapentin in the Spring/summer 2017 but she stopped taking it because of side effects / headaches (horrible, feeling like everything was in slow motion)  Sometimes these visual distortions are triggered by stress at work  She works part time as a  4-5 hours  I wrote a letter on her behalf to get a 10-15 minutes break every 2 hours  She reports issues with anxiety, mostly as it involves the possibility of a recurrent seizure  She feels like she has been looking over her shoulder because of her epilepsy  She tried [employer] but found it useless  She did get her 's license back with [employer] report of occasional visual seizures  Interval History 3/19/2018  -1200  She was previously seen on oxcarbazepine 900mg twice a day, increased due to recurrent visual symptoms  These include visual disturbances of circles on the left visual field followed by a headache, no disorientation or confusion  She reports lastone 3/11/2018, another one on 2018  She was instructed to increase oxcarbazepine to 900mg in AM and 1200mg in PM   She is tolerating the dose without significant side effects  She feels that she has less headaches and more tolerable  In the past headaches would last half a day, she would take Advil up to 3 tabs  These were not frequent (less than once a month headaches)  She has episodes of dizzy spells when she is at work, one time was related to her focal seizures  She works as a , staring at the computer all day, for 4-4 5 hours with 15 minutes  She denies alcohol, illness, or missing doses of oxcarbazepine  She denies convulsions, loss of consciousness, altered awareness, or appearance of zoning out or disorientation      Special Features  Status epilepticus: No  Self Injury Seizures: No  Precipitating Factors: None    Epilepsy Risk Factors:  Abnormal pregnancy: No  Abnormal birth/: No  Abnormal Development: walked after a year, slower with language  Febrile seizures, simple: Yes  Febrile seizures, complex: No  CNS infection: No  Mental retardation: No  Cerebral palsy: No  Head injury (moderate/severe): No  CNS neoplasm: No  CNS malformation: Yes - MRI brain found ventriculomegaly, abnormal splenium of corpus callosum, and subependymal heterotopia  Neurosurgical procedure: No  Stroke: No  Alcohol abuse: No  Drug abuse: No  Family history Sz/epilepsy: No    Prior AEDs:  Levetiracetam (headaches), oxcarbazepine, gabapentin (side effect), pregabalin (continues to have visual auras)    Prior workup:      [id number]   MRI brain  Bilateral subependymal heterotopias in the region of the atria of the lateral ventricles with bilateral colpocephaly  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  Solitary nonspecific FLAIR hyperintensity subcortical white matter right frontal lobe    2017  MRI brain  Stable colpocephalic appearance to the ventricles, right greater than left, bowing / stretching of the posterior corpus callosum  Subtle foci nodular heterotopia along atria of both lateral ventricles more apparent on the right  Tiny focus of increased signal in the white matter of the right frontal horn is less apparent  Hippocampi are somewhat dysmorphic bilaterally    EEGs:  2016  Intermittent focal slowing over the right posterior temporal region but limited to the T6 electrode, consider repeating the study    2016  Photic driving is lateralized to the left; thus, possibly relative neuronal dysfunction in the right posterior region  2018  Frequent, right posterior temporal delta slowing and occipital sharp activity during 10 Hz photic stimulation      Labs:  Component      Latest Ref Rng & Units 4/10/2018   SL AMB LAB WHITE BLOOD CELL COUNT      3 8 - 10 8 Thousand/uL 4 6   SL AMB LAB RED BLOOD CELLS      3 80 - 5 10 Million/uL 4 39   Hemoglobin      11 7 - 15 5 g/dL 13 3   SL AMB HEMATOCRIT      35 0 - 45 0 % 40 4   SL AMB PLATELET COUNT      120 - 400 Thousand/uL 245   SL AMB GLUCOSE      65 - 99 mg/dL 79   BUN      7 - 25 mg/dL 13   Creatinine      0 50 - 1 10 mg/dL 0 93   eGFR Non       > OR = 60 mL/min/1 73m2 87   SL AMB EGFR       > OR = 60 mL/min/1 73m2 101   SL AMB BUN/CREATININE RATIO      6 - 22 (calc) NOT APPLICABLE   SL AMB SODIUM      135 - 146 mmol/L 139   SL AMB POTASSIUM      3 5 - 5 3 mmol/L 4 4   SL AMB CHLORIDE      98 - 110 mmol/L 103   SL AMB CARBON DIOXIDE      20 - 31 mmol/L 31   SL AMB CALCIUM      8 6 - 10 2 mg/dL 9 5   SL AMB PROTEIN, TOTAL      6 1 - 8 1 g/dL 7 6   Serum Albumin      3 6 - 5 1 g/dL 5 0   SL AMB GLOBULIN      1 9 - 3 7 g/dL (calc) 2 6   SL AMB ALBUMIN/GLOBULIN RATIO      1 0 - 2 5 (calc) 1 9   SL AMB BILIRUBIN, TOTAL      0 2 - 1 2 mg/dL 0 3   SL AMB ALKALINE PHOSPHATASE      33 - 115 U/L 60   SL AMB AST      10 - 30 U/L 15   SL AMB ALT      6 - 29 U/L 11   10-HYDROXYCARBAZEPINE (HISTORICAL)      8 0 - 35 0 mcg/mL 39 2 (H)     General exam   Blood pressure 144/82, pulse 84, resp  rate 18, height 5' 3" (1 6 m), weight 58 kg (127 lb 12 8 oz)    Appearance: normally developed, appears well  Carotids: n/a  Cardiovascular: regular rate and rhythm and normal heart sounds  Pulmonary: clear to auscultation   Behavior: Patient started to cry and become emotional during the visit; when she talked about how her anxiety has been worsening    HEENT: anicteric and moist mucus membranes / oral cavity   Fundoscopy: not assessed    Mental status  Orientation: alert and oriented to name, place, time  Fund of Knowledge: intact   Attention and Concentration: intact  Current and Remote Memory:intact  Language: normal, spontaneous speech is normal and comprehension is intact    Cranial Nerves  CN 1: not tested  CN 2: pupils equal round reactive to direct and consenual light   CN 3, 4, 6: EOMI, no nystagmus  CN 5:not assessed  CN 7:muscles of facial expression are symmetric  CN 8:not assessed  CN 9, 10:no dysarthria present  CN 11:not assessed  CN 12:tongue is midline    Motor:  Bulk, Tone: normal bulk, normal tone  Pronation: no pronator drift  Strength: symmetric strength in arms and legs  Abnormal movements: no abnormal movements are present    Sensory:  Lighttouch: intact in all limbs  Romberg:not assessed    Coordination:  FNF:FNF bilaterally intact  ERICA:intact  FFM:intact  Gait/Station:normal gait and normal tandem gait    Reflexes:  not assessed    Past Medical/Surgical History:  Patient Active Problem List   Diagnosis    Anxiety    Congenital cerebral ventriculomegaly ([address] )    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)    Reactive depression    Periventricular heterotopia (HCC)    Headache, unspecified headache type    Visual distortions     Past Medical History:   Diagnosis Date    Cerebral ventriculomegaly 8/25/2016    Seizures (HCC)     Syncopal episodes      History reviewed  No pertinent surgical history      Past Psychiatric History:  Depression: Yes  Anxiety: Yes  Psychosis: No    Medications:    Current Outpatient Prescriptions:     OXcarbazepine (TRILEPTAL) 600 mg tablet, Take one and a half tab twice a day, Disp: 90 tablet, Rfl: 5    pregabalin (LYRICA) 225 MG capsule, Take one tab of the 150mg in AM and one 225mg in PM for 1 month, then increase to 225mg twice a day, Disp: 60 capsule, Rfl: 5    pregabalin (LYRICA) 75 mg capsule, After 1 month of higher dose, take one 150mg tab with one 75mg tab in the AM and one 225mg tab in PM, Disp: 30 capsule, Rfl: 0    Allergies:  No Known Allergies    Family history:  Family History   Problem Relation Age of Onset    No Known Problems Mother     No Known Problems Father     No Known Problems Brother      Paternal Madeleine Yan started to have seizures later in life  No developmental issues  Parents are healthy    Social History  Living situation:  lives with parents  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, works in retail; 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[employer]); sometimes she needs to use a ladder  She has been feeling stressed out, works on a computer for a couple of hours  Driving:  active license  reports that she has never smoked  She has never used smokeless tobacco  She reports that she does not drink alcohol or use drugs  Review of Systems  A review of at least 12 organ/systems was obtained by the medical assistant and reviewed by me, including additional positives/negatives:   Gastrointestinal: Positive for nausea  Endocrine: Negative  Genitourinary: Negative  Musculoskeletal: Negative  Skin: Negative  Neurological: Positive for syncope and headaches  Hematological: Negative  Psychiatric/Behavioral: The patient is nervous/anxious  Depression     Decision making was of high-complexity due to the patient's high risk condition (seizures), psychiatric and neuropsychological comorbidities, behavioral problems, memory and cognitive problems and medication side effects

## 2018-07-30 NOTE — PATIENT INSTRUCTIONS
PLAN:  1 - continue with oxcarbazepine 600mg tab take one and a half tab twice a day  2 - increasing dose of Lyrica  Take one 150mg capsule in the morning and one 225mg capsule in the evening for 30 days; Then take one 150mg capsule with one 75mg capsule in the morning (makes 225mg) and one 225mg capsule in the evening until no more 150mg capsules, then take one 225mg capsule twice a day  3 - check CBC, CMP and oxcarbazepine level in 2 weeks  4 - call the office if you are experiencing dizziness or cognitive issues with higher doses of Lyrica; will try a dose reduction of Oxcarbazepine to 750mg twice a day  5 - if nausea, swelling, or moods are worsening with the addition of Lyrica will need to wean you off of this medication  Due to your worsening anxiety and depression; you need to see a therapist (possibly a psychiatrist in the future)  6 - referral to psychology try [facility] but if the appointment is more than 1 month away please call   [facility]  [address] [address]   Phone: [phone]    7 - follow-up in 1 month with AP and with Dr Jarocho Thomas in 3 months  Will determine if she cannot tolerate Lyrica then will start lamotrigine titration over the course of 8-10 weeks with simultaneous weaning of Lyrica    8 - hold off on the referral to the EMU for seizure characterization and presurgical evaluation

## 2018-08-17 LAB
10OH-CARBAZEPINE SERPL-MCNC: 25 MCG/ML (ref 8–35)
ALBUMIN SERPL-MCNC: 4.7 G/DL (ref 3.6–5.1)
ALBUMIN/GLOB SERPL: 1.8 (CALC) (ref 1–2.5)
ALP SERPL-CCNC: 54 U/L (ref 33–115)
ALT SERPL-CCNC: 12 U/L (ref 6–29)
AST SERPL-CCNC: 14 U/L (ref 10–30)
BASOPHILS # BLD AUTO: 20 CELLS/UL (ref 0–200)
BASOPHILS NFR BLD AUTO: 0.4 %
BILIRUB SERPL-MCNC: 0.3 MG/DL (ref 0.2–1.2)
BUN SERPL-MCNC: 11 MG/DL (ref 7–25)
BUN/CREAT SERPL: NORMAL (CALC) (ref 6–22)
CALCIUM SERPL-MCNC: 9.5 MG/DL (ref 8.6–10.2)
CHLORIDE SERPL-SCNC: 102 MMOL/L (ref 98–110)
CO2 SERPL-SCNC: 27 MMOL/L (ref 20–32)
CREAT SERPL-MCNC: 0.74 MG/DL (ref 0.5–1.1)
EOSINOPHIL # BLD AUTO: 80 CELLS/UL (ref 15–500)
EOSINOPHIL NFR BLD AUTO: 1.6 %
ERYTHROCYTE [DISTWIDTH] IN BLOOD BY AUTOMATED COUNT: 11.8 % (ref 11–15)
GLOBULIN SER CALC-MCNC: 2.6 G/DL (CALC) (ref 1.9–3.7)
GLUCOSE SERPL-MCNC: 80 MG/DL (ref 65–139)
HCT VFR BLD AUTO: 40.7 % (ref 35–45)
HGB BLD-MCNC: 13.5 G/DL (ref 11.7–15.5)
LYMPHOCYTES # BLD AUTO: 1350 CELLS/UL (ref 850–3900)
LYMPHOCYTES NFR BLD AUTO: 27 %
MCH RBC QN AUTO: 30.9 PG (ref 27–33)
MCHC RBC AUTO-ENTMCNC: 33.2 G/DL (ref 32–36)
MCV RBC AUTO: 93.1 FL (ref 80–100)
MONOCYTES # BLD AUTO: 485 CELLS/UL (ref 200–950)
MONOCYTES NFR BLD AUTO: 9.7 %
NEUTROPHILS # BLD AUTO: 3065 CELLS/UL (ref 1500–7800)
NEUTROPHILS NFR BLD AUTO: 61.3 %
PLATELET # BLD AUTO: 215 THOUSAND/UL (ref 140–400)
PMV BLD REES-ECKER: 10.5 FL (ref 7.5–12.5)
POTASSIUM SERPL-SCNC: 4.2 MMOL/L (ref 3.5–5.3)
PROT SERPL-MCNC: 7.3 G/DL (ref 6.1–8.1)
RBC # BLD AUTO: 4.37 MILLION/UL (ref 3.8–5.1)
SL AMB EGFR AFRICAN AMERICAN: 132 ML/MIN/1.73M2
SL AMB EGFR NON AFRICAN AMERICAN: 114 ML/MIN/1.73M2
SODIUM SERPL-SCNC: 137 MMOL/L (ref 135–146)
WBC # BLD AUTO: 5 THOUSAND/UL (ref 3.8–10.8)

## 2018-08-22 ENCOUNTER — OFFICE VISIT (OUTPATIENT)
Dept: NEUROLOGY | Facility: CLINIC | Age: 23
End: 2018-08-22
Payer: COMMERCIAL

## 2018-08-22 ENCOUNTER — TELEPHONE (OUTPATIENT)
Dept: NEUROLOGY | Facility: CLINIC | Age: 23
End: 2018-08-22

## 2018-08-22 VITALS
BODY MASS INDEX: 22.68 KG/M2 | SYSTOLIC BLOOD PRESSURE: 135 MMHG | HEART RATE: 75 BPM | DIASTOLIC BLOOD PRESSURE: 74 MMHG | HEIGHT: 63 IN | WEIGHT: 128 LBS

## 2018-08-22 DIAGNOSIS — F41.9 ANXIETY: ICD-10-CM

## 2018-08-22 DIAGNOSIS — G40.209 LOCALIZATION-RELATED FOCAL EPILEPSY WITH COMPLEX PARTIAL SEIZURES (HCC): Primary | ICD-10-CM

## 2018-08-22 PROCEDURE — 99215 OFFICE O/P EST HI 40 MIN: CPT | Performed by: PHYSICIAN ASSISTANT

## 2018-08-22 RX ORDER — PREGABALIN 150 MG/1
CAPSULE ORAL
Qty: 28 CAPSULE | Refills: 0 | Status: SHIPPED | OUTPATIENT
Start: 2018-08-22 | End: 2018-10-30

## 2018-08-22 RX ORDER — LAMOTRIGINE 25 MG/1
TABLET ORAL
Qty: 168 TABLET | Refills: 0 | Status: SHIPPED | OUTPATIENT
Start: 2018-08-22 | End: 2018-10-30

## 2018-08-22 RX ORDER — PREGABALIN 75 MG/1
CAPSULE ORAL
Qty: 28 CAPSULE | Refills: 0 | Status: SHIPPED | OUTPATIENT
Start: 2018-08-22 | End: 2018-10-30

## 2018-08-22 RX ORDER — LAMOTRIGINE 100 MG/1
100 TABLET ORAL 2 TIMES DAILY
Qty: 30 TABLET | Refills: 2 | Status: SHIPPED | OUTPATIENT
Start: 2018-08-22 | End: 2018-10-30 | Stop reason: SDUPTHER

## 2018-08-22 NOTE — PATIENT INSTRUCTIONS
Continue oxcarbazepine 600mg 1 1/2 tabs twice a day   Will start lamotrigine titration over 8-10 weeks while tapering off Lyrica  Lamotrigine 25mg tab  Week 1 - take one tab at bedtime   Week 2 - take one tab twice a day   Week 3 - take one tab in the AM and two tabs in the PM   Week 4 - take two tabs twice a day   Week 5 - take two tabs in the AM and three tabs in the PM   Week 6 - take three tabs twice a day   Week 7 - take three 25mg tabs in the AM and one 100mg tab in the PM   Week 8 - change to 100mg tabs take one tab twice a day  Lamotrigine has the possibility of causing a rash  This is rare, but if ignored, it could progress and be dangerous  If you notice a rash while taking Lamotrigine, please call the Neurology office right away  We can help look for other causes or discuss if the medication needs to be stopped  Currently on Lyrica 225mg twice a day  Week 1- 150mg AM and 225mg PM   Week 2- 150mg twice a day  Week 3- 75mg AM and 150mg PM   Week 4- 75mg twice a day  Week 5- 75mg at bedtime     -Call the office if you experience any side effects, especially rash      -Get labs done in 8 weeks (prior to the visit with Dr Manju Means)  -Please consider making an appointment with the therapist, as discussed with Dr Manju Means  -Make sure to keep appt with Dr Manju Means on 10/30/18 at 11:00am

## 2018-08-22 NOTE — PROGRESS NOTES
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)  She has had at least one conclusive witnessed seizure that was likely secondary generalized  She continues to have occasional recurrent episodes of visual distortions of the left visual field that are concerning for focal seizures without altered awareness (based on the location of her heterotopia, seizures may involve visual pathways)  She is tolerating high dose oxcarbazepine without clear side effects      It is unfortunate that Ms Adair Mcdaniel continues to have these episodes of left sided visual distortions without altered awareness, presumed to be focal visual seizures from the right occipital region  The differential diagnosis does include migraine headache with aura  At this point, we are using anticonvulsants to prevent the focal seizures  She had persistent visual auras without convulsions since she has been on oxcarbazepine 900mg twice a day  A trial of maximizing Lyrica has been unsuccessful  She is currently on Lyrica 225mg BID and still experiencing the same number of visual episodes as before  She has not had any improvement in her anxiety with Lyrica  At last visit, it was discussed that if Lyrica was not successful, would titrate lamotrigine while simultaneously tapering off Lyrica         Her MRI brain study is stable, no worsening of ventriculomegaly and the right frontal FLAIR anomaly is stable      We will hold off on EMU study for seizure characterization (and presurgical evaluation, but this is going to be complicated if her seizures are coming from primary visual cortex)  At this point, Ms Adair Mcdaniel is not emotionally prepared for an inpatient study  The EMU study may help distinguish between focal seizures and migraines with aura    We discussed the EMU again today and she remains very hesitant and anxious about the idea      Ms Friass depression and anxiety is worsening in part due to uncontrolled visual seizures  At last visit, it was recommended that she is evaluated by a psychologist for psychotherapy management; she may be having a reaction to the difficulty in controlling her focal visual seizures  She reports she has not had time to make the appointment yet  She remains anxious  Plan:  1  Continue with oxcarbazepine 600mg tab take one and a half tabs twice a day  (total of 900mg BID)  2  Will start lamotrigine titration while tapering off Lyrica  Lamotrigine titration given to the patient  Lyrica taper given to the patient (detailed in patient instructions)  We reviewed the titration of lamotrigine and wean of Lyrica in detail and patient voiced clear understanding  Side effects of Lamotrigine were discussed with the patient in detail today, specifically concern of a rash  3  Check CBC, CMP, oxcarbazepine and lamotrigine level in 8 weeks  4  Call the office if you experience any side effects, especially any rash  5  Again suggest seeing a therapist   Patient has all of the information that was given to her at the last visit  6  Will hold off on EMU admission until further discussed with Dr Aries Grewal at next visit  Patient remains anxious about this  7  Follow up with Dr Aries Grewal in October as already scheduled       Diagnoses and all orders for this visit:    Localization-related focal epilepsy with complex partial seizures ([address] )  -     CBC and differential; Future  -     Comprehensive metabolic panel; Future  -     Oxcarbazepine level; Future  -     Lamotrigine level; Future  -     lamoTRIgine (LaMICtal) 25 mg tablet; Per titration given to the patient  -     lamoTRIgine (LaMICtal) 100 mg tablet; Take 1 tablet (100 mg total) by mouth 2 (two) times a day  -     pregabalin (LYRICA) 150 mg capsule;  Take as directed with taper given to patient  - pregabalin (LYRICA) 75 mg capsule; Take 1 tab as directed with taper given to patient    Anxiety           Subjective:    EVERT Gabriel is a 21 y o  right handed female here for follow-up evaluation of focal seizures, visual distortions with headache and worsening of her depression and anxiety  Interval history 8/22/2018: Today, patient reports  At last visit, Lyrica was increased in attempt to control the visual distortions she was continuing to have  She is currently taking 225mg BID, along with OXC 900mg BID  She reports she continues with the same episodes, previously reported having 2-3 per month, and in the last month had 3-4  She does not report any significant side effects from the increase in Lyrica, just that it has not changed her visual episodes  She remains quite anxious, reports she has not had time to make an appointment with the psychologist         AED/side effects/compliance:  -Oxcarbazepine 600mg 1 ½ tabs twice a day  -Lyrica 225mg twice a day  Every now and then she feels dizziness  No missed doses     Event/Seizure semiology:  1  Lightheaded blurry vision, loss of consciousness, convulsion (Last one 8/24/2016)  2  Blurry vision, circles in a part of her visual field, followed by a period of headache, no clear confusion or disorientation (described in January 2017 visit) - recurs 2-3 times a month     Prior Epilepsy History:  Initial intake epilepsy history 9/12/2016  Review of hospital records  Ms Adair Mcdaniel was admitted from 8/24-8/26/2016 to [facility] after a witnessed seizure  She had an MRI brain study that showed cortical malformations including heterotopias, she was subsequently started on levetiracetam 500mg twice a day  Dr Radu Gomez did the initial consultation; she reported that her first seizure was in June 2016, onset of lightheadedness, blurry vision, no memory of passing out but she collapsed, the next thing she remembered was waking up in the ambulance   On the day of admission she felt lightheaded, blurry vision, veering to the sides of the aisle and collapsed, her mother witnessed some of the event but only after she fell; patients eyes were open and mouth was moving, arms were tonically flexed and asymmetric and legs were hypertonic with repeated relaxation and tonic stiffening  Again she did not regain consciousness until she was in the ambulance  She had a history of one febrile seizure, a paternal great grandmother with seizures, head CT scan found ventriculomegaly  Patients history  June 2016, Ms Cunningham recalled that she was at work and the next thing she remembered was people standing over her, she did not remember what happened before other than feeling lightheaded and blurry vision  She denied feeling exhausted, new medications, drugs, illness/fevers, and there were no changes in her usual habits  August 2016, again she does not recall much other than being told she had a seizure for less minute  Her mother reported that she did see the end of the seizure; initially Ms Michelle Manrique had her the arms and legs extended outward, then arms were flexed inward, convulsion lasted about 30 minute, and she was unresponsive for about 5 minutes but she was lethargic for about 15-20 minutes afterwards  Her mother had briefly turned away and did not witness the very start of the seizure  Again, there were no alcohol, drugs, medications, or illness experienced by Ms Cunningham  She denies any history of myoclonus, staring spells, automatisms, unexplained hyperkinetic behaviors, olfactory / gustatory hallucinations, epigastric rising events, boogie vu events, visual hallucinations, unexplained nocturnal enuresis, or nocturnal tongue biting  At 17 months, she had a cold with a super high temperature, she had a seizure, deemed to have been a simple febrile seizure  LEV was associated with daily headaches that come and go throughout the day may last for a couple of hours at a time  These are very severe because she does not do well with pain  Headache is best describe as a throbbing sensation for a couple of hours, daily, she does not take any medication for pain because she is not sure if she can  There is no visual changes, no loss of vision, or changes in refraction  Summary 2017  Switched LVT to [facility] [employer], better tolerated, over the course of 2017, OXC was escalated for recurrent visual distortions of the left side, circles, blurry vision (sensation of visual obscuration lasting for 2 minutes, left side swirls, if severe enough that she would temporarily not see clearly on the left visual field), lasting minutes without altered awareness or loss of consciousness  There is a headache that follows for about half an hour  These visual distortions have been sporadic once 1-5 months  We had tried gabapentin in the Spring/summer 2017 but she stopped taking it because of side effects / headaches (horrible, feeling like everything was in slow motion)  Sometimes these visual distortions are triggered by stress at work  She works part time as a  4-5 hours  I wrote a letter on her behalf to get a 10-15 minutes break every 2 hours  She reports issues with anxiety, mostly as it involves the possibility of a recurrent seizure  She feels like she has been looking over her shoulder because of her epilepsy  She tried [employer] but found it useless  She did get her 's license back with [facility] report of occasional visual seizures  Interval History 3/19/2018  -1200  She was previously seen on oxcarbazepine 900mg twice a day, increased due to recurrent visual symptoms  These include visual disturbances of circles on the left visual field followed by a headache, no disorientation or confusion  She reports lastone 3/11/2018, another one on 2/2/2018   She was instructed to increase oxcarbazepine to 900mg in AM and 1200mg in PM  She is tolerating the dose without significant side effects  She feels that she has less headaches and more tolerable  In the past headaches would last half a day, she would take Advil up to 3 tabs  These were not frequent (less than once a month headaches)  She has episodes of dizzy spells when she is at work, one time was related to her focal seizures  She works as a , staring at the computer all day, for 4-4 5 hours with 15 minutes  She denies alcohol, illness, or missing doses of oxcarbazepine  She denies convulsions, loss of consciousness, altered awareness, or appearance of zoning out or disorientation  Interval history 7/30/2018  Patient tried [address] [address] [id number]; but she developed cognitive difficulties and imbalance  She started Lyrica  EMU was put on hold until she had a trial of another AED  She was tolerating Lyrica 150mg twice a day, so OXC was reduced to 900mg BID  She continues to have occasional visual distortions, the last one was yesterday  At this point, she estimates twice a month  These are circles over the left side of her visual field, lasting 1 minute in duration, followed by a headache lasting 1 hour  She feels that these are happening more frequently  She denies cognitive impairment, staring spell, unresponsiveness, and convulsive activity  She is driving but she has avoided the highways due to her seizures  She has enough of a warning to stop the car or pull over on local streets  She has occasional nausea symptom, cannot identify if related to medication  She denies any issues with cognition but notices some dizziness  She reports that she has been struggling with anxiety and depression  She feels hopeless, helpless, lack of motivation  She denies suicidal ideations or intent at this point  She gets anxious when she is under a lot of stress; there is no palpitations or heart pounding sensations          Special Features  Status epilepticus: No  Self Injury Seizures: No  Precipitating Factors: None     Epilepsy Risk Factors:  Abnormal pregnancy: No  Abnormal birth/: No  Abnormal Development: walked after a year, slower with language  Febrile seizures, simple: Yes  Febrile seizures, complex: No  CNS infection: No  Mental retardation: No  Cerebral palsy: No  Head injury (moderate/severe): No  CNS neoplasm: No  CNS malformation: Yes - MRI brain found ventriculomegaly, abnormal splenium of corpus callosum, and subependymal heterotopia  Neurosurgical procedure: No  Stroke: No  Alcohol abuse: No  Drug abuse: No  Family history Sz/epilepsy: No     Prior AEDs:  Levetiracetam (headaches), oxcarbazepine, gabapentin (side effect), pregabalin (continues to have visual auras)     Prior workup:  [id number]   MRI brain  Bilateral subependymal heterotopias in the region of the atria of the lateral ventricles with bilateral colpocephaly  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  Solitary nonspecific FLAIR hyperintensity subcortical white matter right frontal lobe     2017  MRI brain  Stable colpocephalic appearance to the ventricles, right greater than left, bowing / stretching of the posterior corpus callosum  Subtle foci nodular heterotopia along atria of both lateral ventricles more apparent on the right  Tiny focus of increased signal in the white matter of the right frontal horn is less apparent  Hippocampi are somewhat dysmorphic bilaterally     EEGs:  2016  Intermittent focal slowing over the right posterior temporal region but limited to the T6 electrode, consider repeating the study     2016  Photic driving is lateralized to the left; thus, possibly relative neuronal dysfunction in the right posterior region  2018  Frequent, right posterior temporal delta slowing and occipital sharp activity during 10 Hz photic stimulation      Recent labs:  18  Oxcarbazepine level: 25 0  CBC, CMP normal       The following portions of the patient's history were reviewed and updated as appropriate: current medications, past family history, past medical history, past social history, past surgical history and problem list          Objective:    Blood pressure 135/74, pulse 75, height 5' 3" (1 6 m), weight 58 1 kg (128 lb)  Physical Exam   Constitutional: She appears well-developed and well-nourished  HENT:   Head: Normocephalic and atraumatic  Eyes: EOM are normal  Pupils are equal, round, and reactive to light  Cardiovascular: Intact distal pulses  Neurological: She has normal strength and normal reflexes  Gait and coordination normal    Skin: Skin is warm and dry  Psychiatric: Her speech is normal    anxious       Neurological Exam    Mental Status  The patient is alert and oriented to person, place, time, and situation  Her recent and remote memory are normal  Her speech is normal  Her language is fluent with no aphasia  She has normal attention span and concentration  She has a normal fund of knowledge  Cranial Nerves    CN II: The patient's visual acuity and visual fields are normal   CN III, IV, VI: The patient's pupils are equally round and reactive to light and ocular movements are normal   CN V: The patient has normal facial sensation  CN VII:  The patient has symmetric facial movement  CN VIII:  The patient's hearing is normal   CN IX, X: The patient has symmetric palate movement and normal gag reflex  CN XI: The patient's shoulder shrug strength is normal   CN XII: The patient's tongue is midline without atrophy or fasciculations  Motor  The patient has normal muscle bulk throughout  Her overall muscle tone is normal throughout  Her strength is 5/5 throughout all four extremities  Sensory  The patient's sensation is normal in all four extremities  Reflexes  Deep tendon reflexes are 2+ and symmetric in all four extremities with downgoing toes bilaterally  Gait and Coordination  The patient has normal gait and station   She has normal coordination bilaterally  ROS:    Review of Systems   Constitutional: Negative  Negative for appetite change and fever  HENT: Negative  Negative for hearing loss, tinnitus, trouble swallowing and voice change  Eyes: Negative  Negative for photophobia and pain  Respiratory: Negative  Negative for shortness of breath  Cardiovascular: Negative  Negative for palpitations  Gastrointestinal: Negative  Negative for nausea and vomiting  Endocrine: Negative  Negative for cold intolerance and heat intolerance  Genitourinary: Negative  Negative for dysuria, frequency and urgency  Musculoskeletal: Negative  Negative for myalgias and neck pain  Skin: Negative  Negative for rash  Allergic/Immunologic: Negative  Neurological: Negative  Negative for dizziness, tremors, seizures, syncope, facial asymmetry, speech difficulty, weakness, light-headedness, numbness and headaches  Hematological: Negative  Does not bruise/bleed easily  Psychiatric/Behavioral: Negative  Negative for confusion, hallucinations and sleep disturbance       I personally reviewed the ROS

## 2018-08-22 NOTE — TELEPHONE ENCOUNTER
pharm called regarding both lyrica scripts and lamotrigine    they need titration orders on script to fill   i reviewed titration instructions for both medications from avs

## 2018-10-01 ENCOUNTER — TELEPHONE (OUTPATIENT)
Dept: NEUROLOGY | Facility: CLINIC | Age: 23
End: 2018-10-01

## 2018-10-01 NOTE — TELEPHONE ENCOUNTER
Patient states she thinks the lamictal is causing SE  Started lamotrigine 75 mg BID yesterday and stopped lyrica (she thinks) yesterday  States she "feels like she is leaking" urine all the time  States she has had to urinate much more often  Denies burning or pressure, denies any odor, urine is not cloudy  Please advise

## 2018-10-01 NOTE — TELEPHONE ENCOUNTER
I have not heard of that side effect with lamotrigine  She could have a UTI even without the other symptoms    I would have her call PCP to get her urine checked

## 2018-10-22 LAB
10OH-CARBAZEPINE SERPL-MCNC: 34.8 MCG/ML (ref 8–35)
ALBUMIN SERPL-MCNC: 4.9 G/DL (ref 3.6–5.1)
ALBUMIN/GLOB SERPL: 1.8 (CALC) (ref 1–2.5)
ALP SERPL-CCNC: 57 U/L (ref 33–115)
ALT SERPL-CCNC: 10 U/L (ref 6–29)
AST SERPL-CCNC: 13 U/L (ref 10–30)
BASOPHILS # BLD AUTO: 29 CELLS/UL (ref 0–200)
BASOPHILS NFR BLD AUTO: 0.6 %
BILIRUB SERPL-MCNC: 0.3 MG/DL (ref 0.2–1.2)
BUN SERPL-MCNC: 14 MG/DL (ref 7–25)
BUN/CREAT SERPL: NORMAL (CALC) (ref 6–22)
CALCIUM SERPL-MCNC: 9.8 MG/DL (ref 8.6–10.2)
CHLORIDE SERPL-SCNC: 103 MMOL/L (ref 98–110)
CO2 SERPL-SCNC: 29 MMOL/L (ref 20–32)
CREAT SERPL-MCNC: 0.96 MG/DL (ref 0.5–1.1)
EOSINOPHIL # BLD AUTO: 69 CELLS/UL (ref 15–500)
EOSINOPHIL NFR BLD AUTO: 1.4 %
ERYTHROCYTE [DISTWIDTH] IN BLOOD BY AUTOMATED COUNT: 11.8 % (ref 11–15)
GLOBULIN SER CALC-MCNC: 2.7 G/DL (CALC) (ref 1.9–3.7)
GLUCOSE SERPL-MCNC: 81 MG/DL (ref 65–99)
HCT VFR BLD AUTO: 40.9 % (ref 35–45)
HGB BLD-MCNC: 13.6 G/DL (ref 11.7–15.5)
LAMOTRIGINE SERPL-MCNC: 3.1 MCG/ML (ref 4–18)
LYMPHOCYTES # BLD AUTO: 1529 CELLS/UL (ref 850–3900)
LYMPHOCYTES NFR BLD AUTO: 31.2 %
MCH RBC QN AUTO: 30.4 PG (ref 27–33)
MCHC RBC AUTO-ENTMCNC: 33.3 G/DL (ref 32–36)
MCV RBC AUTO: 91.5 FL (ref 80–100)
MONOCYTES # BLD AUTO: 441 CELLS/UL (ref 200–950)
MONOCYTES NFR BLD AUTO: 9 %
NEUTROPHILS # BLD AUTO: 2832 CELLS/UL (ref 1500–7800)
NEUTROPHILS NFR BLD AUTO: 57.8 %
PLATELET # BLD AUTO: 243 THOUSAND/UL (ref 140–400)
PMV BLD REES-ECKER: 10.3 FL (ref 7.5–12.5)
POTASSIUM SERPL-SCNC: 4.3 MMOL/L (ref 3.5–5.3)
PROT SERPL-MCNC: 7.6 G/DL (ref 6.1–8.1)
RBC # BLD AUTO: 4.47 MILLION/UL (ref 3.8–5.1)
SL AMB EGFR AFRICAN AMERICAN: 97 ML/MIN/1.73M2
SL AMB EGFR NON AFRICAN AMERICAN: 83 ML/MIN/1.73M2
SODIUM SERPL-SCNC: 139 MMOL/L (ref 135–146)
WBC # BLD AUTO: 4.9 THOUSAND/UL (ref 3.8–10.8)

## 2018-10-30 ENCOUNTER — TELEPHONE (OUTPATIENT)
Dept: NEUROLOGY | Facility: CLINIC | Age: 23
End: 2018-10-30

## 2018-10-30 ENCOUNTER — OFFICE VISIT (OUTPATIENT)
Dept: NEUROLOGY | Facility: CLINIC | Age: 23
End: 2018-10-30
Payer: COMMERCIAL

## 2018-10-30 VITALS
HEART RATE: 77 BPM | DIASTOLIC BLOOD PRESSURE: 77 MMHG | WEIGHT: 121 LBS | BODY MASS INDEX: 21.44 KG/M2 | SYSTOLIC BLOOD PRESSURE: 153 MMHG | HEIGHT: 63 IN

## 2018-10-30 DIAGNOSIS — G40.209 LOCALIZATION-RELATED FOCAL EPILEPSY WITH COMPLEX PARTIAL SEIZURES (HCC): Primary | ICD-10-CM

## 2018-10-30 DIAGNOSIS — F32.9 REACTIVE DEPRESSION: ICD-10-CM

## 2018-10-30 DIAGNOSIS — F41.9 ANXIETY: ICD-10-CM

## 2018-10-30 PROCEDURE — 99214 OFFICE O/P EST MOD 30 MIN: CPT | Performed by: PSYCHIATRY & NEUROLOGY

## 2018-10-30 RX ORDER — SULFAMETHOXAZOLE AND TRIMETHOPRIM 800; 160 MG/1; MG/1
TABLET ORAL
Refills: 0 | COMMUNITY
Start: 2018-10-02 | End: 2018-10-30

## 2018-10-30 RX ORDER — LAMOTRIGINE 150 MG/1
150 TABLET ORAL 2 TIMES DAILY
Qty: 60 TABLET | Refills: 5 | Status: SHIPPED | OUTPATIENT
Start: 2018-10-30 | End: 2019-01-08 | Stop reason: DRUGHIGH

## 2018-10-30 RX ORDER — CEPHALEXIN 500 MG/1
CAPSULE ORAL
Refills: 0 | COMMUNITY
Start: 2018-10-05 | End: 2018-10-30

## 2018-10-30 RX ORDER — OXCARBAZEPINE 600 MG/1
TABLET, FILM COATED ORAL
Qty: 90 TABLET | Refills: 5 | Status: SHIPPED | OUTPATIENT
Start: 2018-10-30 | End: 2019-02-20

## 2018-10-30 NOTE — TELEPHONE ENCOUNTER
MSW phoned patient's insurance at [phone]  MSW spoke with Molly Mcqueen who provided 3 in-network psychologists and 3 in-network counselors:    Psychologists    1) Zenon Cason 6, [address], [facility]    2) [facility]  [address], [address]  [place]  [phone]    3) Colon Netter  [address]  ([address])  [place]  [phone]    Counselors    1) Yulia Vargas  [address] [facility], [address]  [place]    2) [facility]  [address]  [place]  [phone]    3) Lauryn Rico  [facility]    Molly Mcqueen stated that additional providers can be located at [employer], using patient's alpha prefix ([id number]), PPO Blue, and patient's zip code ([address])  Molly Mcqueen then transferred this writer's call to Provider Services for benefits  MSW spoke with Arline Houston who quoted outpatient mental health benefits as follows:    - no copay  - $1000 family deductible has already been met, so services are covered at 100%  - services are per calendar year  - no pre-existing condition limitation    MSW spoke with Dr Franci Townsend who recommended that patient see a psychologist  A full list of 35 in-network providers was obtained from [employer] as directed above  MSW emailed this list to patient this date  MSW notified her by phone that the list was sent  There was no answer at [phone]  MSW left message requesting callback  Awaiting same

## 2018-10-30 NOTE — PATIENT INSTRUCTIONS
PLAN:  1 - continue with oxcarbazepine 600mg take one and a half tab twice a day  2 - recommend increasing lamotrigine dose  Take Lamotrigine 100mg tab one tab in AM and 150mg tab one tab in PM until no more 100mg tabs, then take one 150mg tab twice a day  3 - in 2 months check lamotrigine level and CBC  4 - follow-up with LIUDMILA in 2 months and Dr Gómez Daley in 4 months  5 - still recommend you see a psychiatrist and psychologist to help with moods/anxiety

## 2018-10-30 NOTE — LETTER
2018     Jerman Files, [address] [facility]  [address] [address]    Patient: Miguel Ángel Brothers   YOB: 1995   Date of Visit: 10/30/2018       Dear Dr Emily Hubbard: Thank you for referring Mag Austin to me for evaluation  Below are my notes for this consultation  If you have questions, please do not hesitate to call me  I look forward to following your patient along with you  Sincerely,        Hellen Hall MD        CC: No Recipients  Hellen Hall MD  10/30/2018 12:10 PM  Sign at close encounter  Richard Thomas  Name:  Miguel Ángel Brothers   : 1995   Visit Type: follow-up  Referring MD / PCP:  Jerman Driscoll, DO     Assessment:  Ms Gifty Lou is a 21 y o  woman with localization related, 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)  She has had at least one conclusive witnessed seizure that was likely secondary generalized  She continues to have occasional recurrent episodes of visual distortions of the left visual field that are concerning for focal seizures without altered awareness (based on the location of her heterotopia, seizures may involve visual pathways)  She is doing better overall off Lyrica and with lamotrigine  For now, these focal visual seizures are less frequent, but it is too early to determine if she needs dual antiepileptic medication  Her MRI brain study is stable, no worsening of ventriculomegaly and the right frontal FLAIR anomaly is stable  We will hold off on EMU study for seizure characterization (and presurgical evaluation, but this is going to be complicated if her seizures are coming from primary visual cortex)  The EMU study may help distinguish between focal seizures and migraines with aura  Ms Perales depression and anxiety is progressively getting worse  She really needs to establish mental health care    I have asked her to speak to our social work to help arrange for a psychologist appointment and if necessary psychiatry evaluation  A psychologist for psychotherapy or cognitive behavioral therapy to manager her emotions  Plan:   1 - continue with oxcarbazepine 600mg take one and a half tab twice a day  2 - recommend increasing lamotrigine dose  Take Lamotrigine 100mg tab one tab in AM and 150mg tab one tab in PM until no more 100mg tabs, then take one 150mg tab twice a day  3 - in 2 months check lamotrigine level and CBC  4 - follow-up with LIUDMILA in 2 months and Dr Wander Gan in 4 months  5 - still recommend you see a psychiatrist and psychologist to help with moods/anxiety; referred the patient to speak with our  to help her navigate finding a psychologist    Problem List Items Addressed This Visit        Nervous and Auditory    Localization-related focal epilepsy with complex partial seizures ([address] ) - Primary    Relevant Medications    OXcarbazepine (TRILEPTAL) 600 mg tablet    lamoTRIgine (LaMICtal) 150 MG tablet    Other Relevant Orders    Lamotrigine level    CBC and Platelet       Other    Anxiety    Relevant Orders    Ambulatory referral to Psychiatry    Ambulatory referral to Psychology    Reactive depression    Relevant Orders    Ambulatory referral to Psychiatry    Ambulatory referral to Psychology        Chief Complaint:    Chief Complaint     Seizures        HPI:    Brittney Pacheco is a 21 y o  right handed female here for follow-up evaluation of focal seizures, visual distortions with headache and difficulty dealing with her emotions  Interval history 10/30/2018  Despite and increase in Lyrica she continued to have visual distortions 3-4/month at the last visit with Jose Antonio Fernando  Lamotrigine was started with weaning of Lyrica  Lamotrigine was causing side effects of "urinary incontinence" but she was placed on two different antibiotics and her symptoms resolved      She denied rash, balance difficulty, worsening headaches, or insomnia  She denies loss of consciousness, altered awareness, or convulsive activity  She reports having had 3-4 episodes of visual distortion lasting 1-2 minutes in the last two months, last one was 10/27/2018  She feels better overall ever since she stopped the Lyrica  She has not made an appointment with a behavioral health specialist due to her anxiety about making an appointment or committing to seeing a behavioral health specialist     She went back to cutting herself  She reports no suicidal ideation and no thoughts of dying  However, she recently found out that she lost a friend to cancer, her friend was in hospice but she was not ready to know about it  AED/side effects/compliance:  Oxcarbazepine 900mg twice a day  Lamotrigine 100mg twice a day  Every now and then she feels dizziness  No missed doses    Event/Seizure semiology:  1  Lightheaded blurry vision, loss of consciousness, convulsion  (Last one 8/24/2016)  2  Blurry vision, circles in a part of her visual field (left side), followed by a period of headache, no clear confusion or disorientation (described in January 2017 visit) - recurs 2-3 times a month    Women of childbearing age:  Contraception: not sexual activity  Folic acid: no - does not want at this point    Prior Epilepsy History:  Initial intake epilepsy history 9/12/2016  Review of hospital records  Ms Jenny Yuen was admitted from 8/24-8/26/2016 to [facility] after a witnessed seizure  She had an MRI brain study that showed cortical malformations including heterotopias, she was subsequently started on levetiracetam 500mg twice a day  Dr Thaddeus Ruiz did the initial consultation; she reported that her first seizure was in June 2016, onset of lightheadedness, blurry vision, no memory of passing out but she collapsed, the next thing she remembered was waking up in the ambulance    On the day of admission she felt lightheaded, blurry vision, veering to the sides of the aisle and collapsed, her mother witnessed some of the event but only after she fell; patients eyes were open and mouth was moving, arms were tonically flexed and asymmetric and legs were hypertonic with repeated relaxation and tonic stiffening  Again she did not regain consciousness until she was in the ambulance  She had a history of one febrile seizure, a paternal great grandmother with seizures, head CT scan found ventriculomegaly  Patients history  June 2016, Ms Cunningham recalled that she was at work and the next thing she remembered was people standing over her, she did not remember what happened before other than feeling lightheaded and blurry vision  She denied feeling exhausted, new medications, drugs, illness/fevers, and there were no changes in her usual habits  August 2016, again she does not recall much other than being told she had a seizure for less minute  Her mother reported that she did see the end of the seizure; initially Ms Marlee Lynn had her the arms and legs extended outward, then arms were flexed inward, convulsion lasted about 30 minute, and she was unresponsive for about 5 minutes but she was lethargic for about 15-20 minutes afterwards  Her mother had briefly turned away and did not witness the very start of the seizure  Again, there were no alcohol, drugs, medications, or illness experienced by Ms Cunningham  She denies any history of myoclonus, staring spells, automatisms, unexplained hyperkinetic behaviors, olfactory / gustatory hallucinations, epigastric rising events, boogie vu events, visual hallucinations, unexplained nocturnal enuresis, or nocturnal tongue biting  At 17 months, she had a cold with a super high temperature, she had a seizure, deemed to have been a simple febrile seizure  LEV was associated with daily headaches that come and go throughout the day may last for a couple of hours at a time  These are very severe because she does not do well with pain    Headache is best describe as a throbbing sensation for a couple of hours, daily, she does not take any medication for pain because she is not sure if she can  There is no visual changes, no loss of vision, or changes in refraction  Summary 2017  [facility] [address], better tolerated, over the course of 2017, OXC was escalated for recurrent visual distortions of the left side, circles, blurry vision (sensation of visual obscuration lasting for 2 minutes, left side swirls, if severe enough that she would temporarily not see clearly on the left visual field), lasting minutes without altered awareness or loss of consciousness  There is a headache that follows for about half an hour  These visual distortions have been sporadic once 1-5 months  We had tried gabapentin in the Spring/summer 2017 but she stopped taking it because of side effects / headaches (horrible, feeling like everything was in slow motion)  Sometimes these visual distortions are triggered by stress at work  She works part time as a  4-5 hours  I wrote a letter on her behalf to get a 10-15 minutes break every 2 hours  She reports issues with anxiety, mostly as it involves the possibility of a recurrent seizure  She feels like she has been looking over her shoulder because of her epilepsy  She tried [employer] but found it useless  She did get her 's license back with [facility] report of occasional visual seizures  Interval History 3/19/2018  -1200  She continues to have focal visual seizures, visual disturbances of circles on the left visual field followed by a headache, no disorientation or confusion  She reports last one 3/11/2018, another one on 2/2/2018  She feels that she has less headaches and more tolerable  In the past headaches would last half a day, she would take Advil up to 3 tabs  These were not frequent (less than once a month headaches)      She has episodes of dizzy spells when she is at work, one time was related to her focal seizures  Interval history 2018  -900, -150  OXC [id number] caused cognitive difficulties and imbalance, reduced dose  She started Lyrica  EMU was put on hold until she had a trial of another AED  She was tolerating Lyrica 150mg twice a dayy  At this point, she estimates twice a month  These are circles over the left side of her visual field, lasting 1 minute in duration, followed by a headache lasting 1 hour  She feels that these are happening more frequently  She denies cognitive impairment, staring spell, unresponsiveness, and convulsive activity  She is driving but she has avoided the highways due to her seizures  She has enough of a warning to stop the car or pull over on local streets  She reports that she has been struggling with anxiety and depression  She feels hopeless, helpless, lack of motivation  She denies suicidal ideations or intent at this point      Special Features  Status epilepticus: No  Self Injury Seizures: No  Precipitating Factors: None    Epilepsy Risk Factors:  Abnormal pregnancy: No  Abnormal birth/: No  Abnormal Development: walked after a year, slower with language  Febrile seizures, simple: Yes  Febrile seizures, complex: No  CNS infection: No  Mental retardation: No  Cerebral palsy: No  Head injury (moderate/severe): No  CNS neoplasm: No  CNS malformation: Yes  MRI brain found ventriculomegaly, abnormal splenium of corpus callosum, and subependymal heterotopia  Neurosurgical procedure: No  Stroke: No  Alcohol abuse: No  Drug abuse: No  Family history Sz/epilepsy: No    Prior AEDs:  Levetiracetam (headaches), oxcarbazepine, gabapentin (side effect), pregabalin (continues to have visual auras and very emotional), lamotrigine (feels better)    Prior workup:      [id number]   MRI brain  Bilateral subependymal heterotopias in the region of the atria of the lateral ventricles with bilateral colpocephaly  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  Solitary nonspecific FLAIR hyperintensity subcortical white matter right frontal lobe    11/7/2017  MRI brain  Stable colpocephalic appearance to the ventricles, right greater than left, bowing / stretching of the posterior corpus callosum  Subtle foci nodular heterotopia along atria of both lateral ventricles more apparent on the right  Tiny focus of increased signal in the white matter of the right frontal horn is less apparent  Hippocampi are somewhat dysmorphic bilaterally    EEGs:  8/25/2016  Intermittent focal slowing over the right posterior temporal region but limited to the T6 electrode, consider repeating the study    9/23/2016  Photic driving is lateralized to the left; thus, possibly relative neuronal dysfunction in the right posterior region  4/23/2018  Frequent, right posterior temporal delta slowing and occipital sharp activity during 10 Hz photic stimulation      Labs:  Component      Latest Ref Rng & Units 10/18/2018   White Blood Cell Count      3 8 - 10 8 Thousand/uL 4 9   RBC      3 80 - 5 10 Million/uL 4 47   Hemoglobin      11 7 - 15 5 g/dL 13 6   HCT      35 0 - 45 0 % 40 9   Platelet Count      977 - 400 Thousand/uL 243   Glucose      65 - 99 mg/dL 81   BUN      7 - 25 mg/dL 14   Creatinine      0 50 - 1 10 mg/dL 0 96   eGFR Non       > OR = 60 mL/min/1 73m2 83   SL AMB EGFR       > OR = 60 mL/min/1 73m2 97   SL AMB BUN/CREATININE RATIO      6 - 22 (calc) NOT APPLICABLE   Sodium      135 - 146 mmol/L 139   Potassium      3 5 - 5 3 mmol/L 4 3   Chloride      98 - 110 mmol/L 103   CO2      20 - 32 mmol/L 29   SL AMB CALCIUM      8 6 - 10 2 mg/dL 9 8   SL AMB PROTEIN, TOTAL      6 1 - 8 1 g/dL 7 6   Albumin      3 6 - 5 1 g/dL 4 9   Globulin      1 9 - 3 7 g/dL (calc) 2 7   SL AMB ALBUMIN/GLOBULIN RATIO      1 0 - 2 5 (calc) 1 8   TOTAL BILIRUBIN      0 2 - 1 2 mg/dL 0 3   ALK PHOS      33 - 115 U/L 57   SL AMB AST      10 - 30 U/L 13   SL AMB ALT      6 - 29 U/L 10   LAMOTRIGINE LEVEL      4 0 - 18 0 mcg/mL 3 1 (L)   10-HYDROXYCARBAZEPINE (HISTORICAL)      8 0 - 35 0 mcg/mL 34 8     General exam   Blood pressure 153/77, pulse 77, height 5' 3" (1 6 m), weight 54 9 kg (121 lb)    Appearance: normally developed, appears well  Carotids: n/a  Cardiovascular: regular rate and rhythm and normal heart sounds  Pulmonary: clear to auscultation   Behavior: Patient started to cry and become emotional during the visit; when she talked about how her anxiety has been worsening    HEENT: anicteric and moist mucus membranes / oral cavity   Fundoscopy: not assessed    Mental status  Orientation: alert and oriented to name, place, time  Fund of Knowledge: intact   Attention and Concentration: intact  Current and Remote Memory:intact  Language: normal, spontaneous speech is normal and comprehension is intact    Cranial Nerves  CN 1: not tested  CN 2: pupils equal round reactive to direct and consenual light   CN 3, 4, 6: EOMI, no nystagmus  CN 5:not assessed  CN 7:muscles of facial expression are symmetric  CN 8:not assessed  CN 9, 10:no dysarthria present  CN 11:not assessed  CN 12:tongue is midline    Motor:  Bulk, Tone: normal bulk, normal tone  Pronation: no pronator drift  Strength: symmetric strength in arms and legs  Abnormal movements: no abnormal movements are present    Sensory:  Lighttouch: intact in all limbs  Romberg:not assessed    Coordination:  FNF:FNF bilaterally intact  ERICA:intact  FFM:intact  Gait/Station:normal gait and normal tandem gait    Reflexes:  not assessed    Past Medical/Surgical History:  Patient Active Problem List   Diagnosis    Anxiety    Congenital cerebral ventriculomegaly ([address] )    Localization-related focal epilepsy with complex partial seizures ([address] )    Reactive depression    Periventricular heterotopia (HCC)    Headache, unspecified headache type    Visual distortions     Past Medical History:   Diagnosis Date    Cerebral ventriculomegaly 8/25/2016    Seizures (HCC)     Syncopal episodes      History reviewed  No pertinent surgical history  Past Psychiatric History:  Depression: Yes  Anxiety: Yes  Psychosis: No    Medications:    Current Outpatient Prescriptions:     lamoTRIgine (LaMICtal) 150 MG tablet, Take 1 tablet (150 mg total) by mouth 2 (two) times a day, Disp: 60 tablet, Rfl: 5    OXcarbazepine (TRILEPTAL) 600 mg tablet, Take one and a half tab twice a day, Disp: 90 tablet, Rfl: 5    Allergies:  No Known Allergies    Family history:  Family History   Problem Relation Age of Onset    No Known Problems Mother     No Known Problems Father     No Known Problems Brother      Paternal Alvaro Aranda started to have seizures later in life  No developmental issues  Parents are healthy    Social History  Living situation:  lives with parents  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, works in retail; 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[address] [employer] [address] [address] [address]); sometimes she needs to use a ladder  She has been feeling stressed out, works on a computer for a couple of hours  Driving:  active license  reports that she has never smoked  She has never used smokeless tobacco  She reports that she does not drink alcohol or use drugs  Review of Systems  A review of at least 12 organ/systems was obtained by the medical assistant and reviewed by me, including additional positives/negatives:   Gastrointestinal: Positive for nausea  Negative for vomiting  Neurological: Positive for headaches  Negative for dizziness, tremors, seizures, syncope, facial asymmetry, speech difficulty, weakness, light-headedness and numbness  Hematological: Negative  Does not bruise/bleed easily  Psychiatric/Behavioral: Negative for confusion, hallucinations and sleep disturbance  The patient is nervous/anxious           Depression   Decision making was of high-complexity due to the patient's high risk condition (seizures), psychiatric and neuropsychological comorbidities, behavioral problems, memory and cognitive problems and medication side effects  The total amount of time spent with the patient was 30 minutes  More than 50% of this time was devoted to counseling and coordination of care  Issues addressed during this clinic visit included overall management, medication counseling or monitoring (including adverse effects, side effects and risks of antiepileptic medications)     Start time: 11:00AM  End time: 11:30AM

## 2018-10-30 NOTE — TELEPHONE ENCOUNTER
MSW was asked by Dr Abe Quijano to assist patient in getting an appointment for talk therapy  Dr Abe Quijano stated that he had previously advised patient to contact her insurance to get a list of in-network providers and call to schedule an appointment, but her anxiety prevented her from doing so  Dr Abe Quijano stated that he would like to ensure that patient gets an appointment scheduled  MSW met with patient to offer choice of providers  Patient was interested in [address] [place] [address] until she learned that they are currently scheduling out 2 months (into the beginning of January)  Patient stated that she was hoping to be seen sooner  MSW did offer to contact patient's insurance to get a list of in-network providers  Patient was agreeable to same  MSW attempted several times to contact patient's insurance while patient waited, but there were phone issues with Francesca Cabezas  Patient asked that this writer email her the list of in-network providers and provided her email address as: [email or web address]  MSW will also contact patient to notify her once email is sent  MSW will then follow-up with patient to ensure that she is able to schedule an appointment

## 2018-10-30 NOTE — PROGRESS NOTES
Review of Systems    {LimROS-complete:76637}      Review of Systems   Constitutional: Negative  Negative for appetite change and fever  HENT: Negative  Negative for hearing loss, tinnitus, trouble swallowing and voice change  Eyes: Negative  Negative for photophobia and pain  Respiratory: Negative  Negative for shortness of breath  Cardiovascular: Negative  Negative for palpitations  Gastrointestinal: Positive for nausea  Negative for vomiting  Endocrine: Negative  Negative for cold intolerance and heat intolerance  Genitourinary: Negative  Negative for dysuria, frequency and urgency  Musculoskeletal: Negative  Negative for myalgias and neck pain  Skin: Negative  Negative for rash  Neurological: Positive for headaches  Negative for dizziness, tremors, seizures, syncope, facial asymmetry, speech difficulty, weakness, light-headedness and numbness  Hematological: Negative  Does not bruise/bleed easily  Psychiatric/Behavioral: Negative for confusion, hallucinations and sleep disturbance  The patient is nervous/anxious           Depression

## 2018-10-30 NOTE — PROGRESS NOTES
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)  She has had at least one conclusive witnessed seizure that was likely secondary generalized  She continues to have occasional recurrent episodes of visual distortions of the left visual field that are concerning for focal seizures without altered awareness (based on the location of her heterotopia, seizures may involve visual pathways)  She is doing better overall off Lyrica and with lamotrigine  For now, these focal visual seizures are less frequent, but it is too early to determine if she needs dual antiepileptic medication  Her MRI brain study is stable, no worsening of ventriculomegaly and the right frontal FLAIR anomaly is stable  We will hold off on EMU study for seizure characterization (and presurgical evaluation, but this is going to be complicated if her seizures are coming from primary visual cortex)  The EMU study may help distinguish between focal seizures and migraines with aura  Ms Perales depression and anxiety is progressively getting worse  She really needs to establish mental health care  I have asked her to speak to our social work to help arrange for a psychologist appointment and if necessary psychiatry evaluation  A psychologist for psychotherapy or cognitive behavioral therapy to manager her emotions        Plan:   1 - continue with oxcarbazepine 600mg take one and a half tab twice a day  2 - recommend increasing lamotrigine dose  Take Lamotrigine 100mg tab one tab in AM and 150mg tab one tab in PM until no more 100mg tabs, then take one 150mg tab twice a day  3 - in 2 months check lamotrigine level and CBC  4 - follow-up with LIUDMILA in 2 months and Dr Manisha Toure in 4 months  5 - still recommend you see a psychiatrist and psychologist to help with moods/anxiety; referred the patient to speak with our  to help her navigate finding a psychologist    Problem List Items Addressed This Visit        Nervous and Auditory    Localization-related focal epilepsy with complex partial seizures ([address] ) - Primary    Relevant Medications    OXcarbazepine (TRILEPTAL) 600 mg tablet    lamoTRIgine (LaMICtal) 150 MG tablet    Other Relevant Orders    Lamotrigine level    CBC and Platelet       Other    Anxiety    Relevant Orders    Ambulatory referral to Psychiatry    Ambulatory referral to Psychology    Reactive depression    Relevant Orders    Ambulatory referral to Psychiatry    Ambulatory referral to Psychology        Chief Complaint:    Chief Complaint     Seizures        HPI:    Rigoberto Marks is a 21 y o  right handed female here for follow-up evaluation of focal seizures, visual distortions with headache and difficulty dealing with her emotions  Interval history 10/30/2018  Despite and increase in Lyrica she continued to have visual distortions 3-4/month at the last visit with Leidy Echevarria  Lamotrigine was started with weaning of Lyrica  Lamotrigine was causing side effects of "urinary incontinence" but she was placed on two different antibiotics and her symptoms resolved  She denied rash, balance difficulty, worsening headaches, or insomnia  She denies loss of consciousness, altered awareness, or convulsive activity  She reports having had 3-4 episodes of visual distortion lasting 1-2 minutes in the last two months, last one was 10/27/2018  She feels better overall ever since she stopped the Lyrica  She has not made an appointment with a behavioral health specialist due to her anxiety about making an appointment or committing to seeing a behavioral health specialist     She went back to cutting herself  She reports no suicidal ideation and no thoughts of dying    However, she recently found out that she lost a friend to cancer, her friend was in hospice but she was not ready to know about it  AED/side effects/compliance:  Oxcarbazepine 900mg twice a day  Lamotrigine 100mg twice a day  Every now and then she feels dizziness  No missed doses    Event/Seizure semiology:  1  Lightheaded blurry vision, loss of consciousness, convulsion  (Last one 8/24/2016)  2  Blurry vision, circles in a part of her visual field (left side), followed by a period of headache, no clear confusion or disorientation (described in January 2017 visit) - recurs 2-3 times a month    Women of childbearing age:  Contraception: not sexual activity  Folic acid: no - does not want at this point    Prior Epilepsy History:  Initial intake epilepsy history 9/12/2016  Review of hospital records  Ms Pancho Nash was admitted from 8/24-8/26/2016 to [facility] after a witnessed seizure  She had an MRI brain study that showed cortical malformations including heterotopias, she was subsequently started on levetiracetam 500mg twice a day  Dr Giana Cervantes did the initial consultation; she reported that her first seizure was in June 2016, onset of lightheadedness, blurry vision, no memory of passing out but she collapsed, the next thing she remembered was waking up in the ambulance  On the day of admission she felt lightheaded, blurry vision, veering to the sides of the aisle and collapsed, her mother witnessed some of the event but only after she fell; patients eyes were open and mouth was moving, arms were tonically flexed and asymmetric and legs were hypertonic with repeated relaxation and tonic stiffening  Again she did not regain consciousness until she was in the ambulance  She had a history of one febrile seizure, a paternal great grandmother with seizures, head CT scan found ventriculomegaly  Patients history  June 2016, Ms Cunningham recalled that she was at work and the next thing she remembered was people standing over her, she did not remember what happened before other than feeling lightheaded and blurry vision  She denied feeling exhausted, new medications, drugs, illness/fevers, and there were no changes in her usual habits  August 2016, again she does not recall much other than being told she had a seizure for less minute  Her mother reported that she did see the end of the seizure; initially Ms Uzma Mena had her the arms and legs extended outward, then arms were flexed inward, convulsion lasted about 30 minute, and she was unresponsive for about 5 minutes but she was lethargic for about 15-20 minutes afterwards  Her mother had briefly turned away and did not witness the very start of the seizure  Again, there were no alcohol, drugs, medications, or illness experienced by Ms Cunningham  She denies any history of myoclonus, staring spells, automatisms, unexplained hyperkinetic behaviors, olfactory / gustatory hallucinations, epigastric rising events, boogie vu events, visual hallucinations, unexplained nocturnal enuresis, or nocturnal tongue biting  At 17 months, she had a cold with a super high temperature, she had a seizure, deemed to have been a simple febrile seizure  LEV was associated with daily headaches that come and go throughout the day may last for a couple of hours at a time  These are very severe because she does not do well with pain  Headache is best describe as a throbbing sensation for a couple of hours, daily, she does not take any medication for pain because she is not sure if she can  There is no visual changes, no loss of vision, or changes in refraction       Summary 2017  [facility], over the course of 2017, [facility] was escalated for recurrent visual distortions of the left side, circles, blurry vision (sensation of visual obscuration lasting for 2 minutes, left side swirls, if severe enough that she would temporarily not see clearly on the left visual field), lasting minutes without altered awareness or loss of consciousness  There is a headache that follows for about half an hour  These visual distortions have been sporadic once 1-5 months  We had tried gabapentin in the Spring/summer 2017 but she stopped taking it because of side effects / headaches (horrible, feeling like everything was in slow motion)  Sometimes these visual distortions are triggered by stress at work  She works part time as a  4-5 hours  I wrote a letter on her behalf to get a 10-15 minutes break every 2 hours  She reports issues with anxiety, mostly as it involves the possibility of a recurrent seizure  She feels like she has been looking over her shoulder because of her epilepsy  She tried [employer] but found it useless  She did get her 's license back with [employer] report of occasional visual seizures  Interval History 3/19/2018  -1200  She continues to have focal visual seizures, visual disturbances of circles on the left visual field followed by a headache, no disorientation or confusion  She reports last one 3/11/2018, another one on 2/2/2018  She feels that she has less headaches and more tolerable  In the past headaches would last half a day, she would take Advil up to 3 tabs  These were not frequent (less than once a month headaches)  She has episodes of dizzy spells when she is at work, one time was related to her focal seizures  Interval history 7/30/2018  -900, -150  OXC [id number] caused cognitive difficulties and imbalance, reduced dose  She started Lyrica  EMU was put on hold until she had a trial of another AED  She was tolerating Lyrica 150mg twice a dayy  At this point, she estimates twice a month  These are circles over the left side of her visual field, lasting 1 minute in duration, followed by a headache lasting 1 hour  She feels that these are happening more frequently    She denies cognitive impairment, staring spell, unresponsiveness, and convulsive activity  She is driving but she has avoided the highways due to her seizures  She has enough of a warning to stop the car or pull over on local streets  She reports that she has been struggling with anxiety and depression  She feels hopeless, helpless, lack of motivation  She denies suicidal ideations or intent at this point      Special Features  Status epilepticus: No  Self Injury Seizures: No  Precipitating Factors: None    Epilepsy Risk Factors:  Abnormal pregnancy: No  Abnormal birth/: No  Abnormal Development: walked after a year, slower with language  Febrile seizures, simple: Yes  Febrile seizures, complex: No  CNS infection: No  Mental retardation: No  Cerebral palsy: No  Head injury (moderate/severe): No  CNS neoplasm: No  CNS malformation: Yes - MRI brain found ventriculomegaly, abnormal splenium of corpus callosum, and subependymal heterotopia  Neurosurgical procedure: No  Stroke: No  Alcohol abuse: No  Drug abuse: No  Family history Sz/epilepsy: No    Prior AEDs:  Levetiracetam (headaches), oxcarbazepine, gabapentin (side effect), pregabalin (continues to have visual auras and very emotional), lamotrigine (feels better)    Prior workup:      [id number]   MRI brain  Bilateral subependymal heterotopias in the region of the atria of the lateral ventricles with bilateral colpocephaly  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  Solitary nonspecific FLAIR hyperintensity subcortical white matter right frontal lobe    2017  MRI brain  Stable colpocephalic appearance to the ventricles, right greater than left, bowing / stretching of the posterior corpus callosum  Subtle foci nodular heterotopia along atria of both lateral ventricles more apparent on the right  Tiny focus of increased signal in the white matter of the right frontal horn is less apparent  Hippocampi are somewhat dysmorphic bilaterally    EEGs:  2016  Intermittent focal slowing over the right posterior temporal region but limited to the T6 electrode, consider repeating the study    9/23/2016  Photic driving is lateralized to the left; thus, possibly relative neuronal dysfunction in the right posterior region  4/23/2018  Frequent, right posterior temporal delta slowing and occipital sharp activity during 10 Hz photic stimulation  Labs:  Component      Latest Ref Rng & Units 10/18/2018   White Blood Cell Count      3 8 - 10 8 Thousand/uL 4 9   RBC      3 80 - 5 10 Million/uL 4 47   Hemoglobin      11 7 - 15 5 g/dL 13 6   HCT      35 0 - 45 0 % 40 9   Platelet Count      192 - 400 Thousand/uL 243   Glucose      65 - 99 mg/dL 81   BUN      7 - 25 mg/dL 14   Creatinine      0 50 - 1 10 mg/dL 0 96   eGFR Non       > OR = 60 mL/min/1 73m2 83   SL AMB EGFR       > OR = 60 mL/min/1 73m2 97   SL AMB BUN/CREATININE RATIO      6 - 22 (calc) NOT APPLICABLE   Sodium      135 - 146 mmol/L 139   Potassium      3 5 - 5 3 mmol/L 4 3   Chloride      98 - 110 mmol/L 103   CO2      20 - 32 mmol/L 29   SL AMB CALCIUM      8 6 - 10 2 mg/dL 9 8   SL AMB PROTEIN, TOTAL      6 1 - 8 1 g/dL 7 6   Albumin      3 6 - 5 1 g/dL 4 9   Globulin      1 9 - 3 7 g/dL (calc) 2 7   SL AMB ALBUMIN/GLOBULIN RATIO      1 0 - 2 5 (calc) 1 8   TOTAL BILIRUBIN      0 2 - 1 2 mg/dL 0 3   ALK PHOS      33 - 115 U/L 57   SL AMB AST      10 - 30 U/L 13   SL AMB ALT      6 - 29 U/L 10   LAMOTRIGINE LEVEL      4 0 - 18 0 mcg/mL 3 1 (L)   10-HYDROXYCARBAZEPINE (HISTORICAL)      8 0 - 35 0 mcg/mL 34 8     General exam   Blood pressure 153/77, pulse 77, height 5' 3" (1 6 m), weight 54 9 kg (121 lb)    Appearance: normally developed, appears well  Carotids: n/a  Cardiovascular: n/a  Pulmonary: n/a    HEENT: anicteric and moist mucus membranes / oral cavity   Fundoscopy: not assessed    Mental status  Orientation: alert and oriented to name, place, time  Fund of Knowledge: intact   Attention and Concentration: intact  Current and Remote Memory:intact  Language: normal    Cranial Nerves  CN 1: not tested  CN 2: pupils equal round reactive to direct and consenual light   CN 3, 4, 6: EOMI, no nystagmus  CN 5:not assessed  CN 7:muscles of facial expression are symmetric  CN 8:not assessed  CN 9, 10:no dysarthria present  CN 11:not assessed  CN 12:tongue is midline    Motor:  Bulk, Tone: normal bulk, normal tone  Pronation: no pronator drift  Strength: symmetric strength in arms and legs    Sensory:  Lighttouch: intact in all limbs  Romberg:not assessed    Coordination:  FNF:FNF bilaterally intact  ERICA:intact  FFM:intact  Gait/Station:normal gait and normal tandem gait    Reflexes:  not assessed    Past Medical/Surgical History:  Patient Active Problem List   Diagnosis    Anxiety    Congenital cerebral ventriculomegaly (HCC)    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)    Reactive depression    Periventricular heterotopia (HCC)    Headache, unspecified headache type    Visual distortions     Past Medical History:   Diagnosis Date    Cerebral ventriculomegaly 8/25/2016    Seizures (HCC)     Syncopal episodes      History reviewed  No pertinent surgical history      Past Psychiatric History:  Depression: Yes  Anxiety: Yes  Psychosis: No    Medications:    Current Outpatient Prescriptions:     lamoTRIgine (LaMICtal) 150 MG tablet, Take 1 tablet (150 mg total) by mouth 2 (two) times a day, Disp: 60 tablet, Rfl: 5    OXcarbazepine (TRILEPTAL) 600 mg tablet, Take one and a half tab twice a day, Disp: 90 tablet, Rfl: 5    Allergies:  No Known Allergies    Family history:  Family History   Problem Relation Age of Onset    No Known Problems Mother     No Known Problems Father     No Known Problems Brother      Paternal Rodger Mckeon started to have seizures later in life  No developmental issues  Parents are healthy    Social History  Living situation:  lives with parents  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, works in retail; 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[address] [address] [employer]); sometimes she needs to use a ladder  She has been feeling stressed out, works on a computer for a couple of hours  Driving:  active license  reports that she has never smoked  She has never used smokeless tobacco  She reports that she does not drink alcohol or use drugs  Review of Systems  A review of at least 12 organ/systems was obtained by the medical assistant and reviewed by me, including additional positives/negatives:   Gastrointestinal: Positive for nausea  Negative for vomiting  Neurological: Positive for headaches  Negative for dizziness, tremors, seizures, syncope, facial asymmetry, speech difficulty, weakness, light-headedness and numbness  Hematological: Negative  Does not bruise/bleed easily  Psychiatric/Behavioral: Negative for confusion, hallucinations and sleep disturbance  The patient is nervous/anxious  Depression   Decision making was of high-complexity due to the patient's high risk condition (seizures), psychiatric and neuropsychological comorbidities, behavioral problems, memory and cognitive problems and medication side effects  The total amount of time spent with the patient was 30 minutes  More than 50% of this time was devoted to counseling and coordination of care  Issues addressed during this clinic visit included overall management, medication counseling or monitoring (including adverse effects, side effects and risks of antiepileptic medications)     Start time: 11:00AM  End time: 11:30AM

## 2018-10-31 NOTE — TELEPHONE ENCOUNTER
MSW attempted to reach patient this date to follow-up regarding in-network psychologist  No answer at [phone]  MSW left message requesting callback  Awaiting same

## 2018-11-01 NOTE — TELEPHONE ENCOUNTER
MSW attempted to reach patient this date to follow-up regarding in-network psychologist  No answer at [phone]  MSW left message requesting callback  Awaiting same  Since there have been 3 unsuccessful attempts to reach patient, MSW will mail patient an "Unable to Reach" letter  Same placed in the mail this date

## 2018-11-01 NOTE — TELEPHONE ENCOUNTER
Kathryn can you assist Malachi in reaching the patient please try calling the patient in a couple of weeks if there is no response from the patient  Ms Marcelino Grossman is really struggling dealing with her emotions and depression

## 2018-11-02 NOTE — TELEPHONE ENCOUNTER
LATE ENTRY from 11/1/18:    MSW received correspondence from patient stating "Hi there  I got your message and I would really rather not look into this any further "     Dr Cait Shah notified  MSW will be able to assist patient in the future should she change her mind and wish to pursue psychology services

## 2018-11-05 NOTE — TELEPHONE ENCOUNTER
Would you like me to wait until the patient reaches out to us again regarding this or try calling her in a couple weeks to check in?

## 2018-11-05 NOTE — TELEPHONE ENCOUNTER
I think the patient was clear from her message to [facility] that she is not interested at this point  I'll readdress this when the patient is seen in the office

## 2018-11-13 ENCOUNTER — TELEPHONE (OUTPATIENT)
Dept: NEUROLOGY | Facility: CLINIC | Age: 23
End: 2018-11-13

## 2018-11-13 NOTE — TELEPHONE ENCOUNTER
Patient states she has been taking increased dose of lamotrigine 150 BID since the end of Oct  And today she had double vision that lasted continuously for half an hour this morning, then had a headache after the double vision  She felt dizzy momentarily and fell down, did not injure herself  Did not have a seizure, did not lose consciousness, denies speech changes, no nausea/vomiting  Denies recent illness  No other sxs  Trileptal 600 mg 1 5 tabs BID  No other med changes  Please advise

## 2018-11-13 NOTE — TELEPHONE ENCOUNTER
Have her decrease oxcarbazepine to 600mg twice a day and see if the symptoms get better  Trying to transition her to lamotrigine

## 2018-12-03 ENCOUNTER — TELEPHONE (OUTPATIENT)
Dept: NEUROLOGY | Facility: CLINIC | Age: 23
End: 2018-12-03

## 2018-12-11 ENCOUNTER — TELEPHONE (OUTPATIENT)
Dept: NEUROLOGY | Facility: CLINIC | Age: 23
End: 2018-12-11

## 2019-01-05 LAB
BASOPHILS # BLD AUTO: 31 CELLS/UL (ref 0–200)
BASOPHILS NFR BLD AUTO: 0.5 %
EOSINOPHIL # BLD AUTO: 112 CELLS/UL (ref 15–500)
EOSINOPHIL NFR BLD AUTO: 1.8 %
ERYTHROCYTE [DISTWIDTH] IN BLOOD BY AUTOMATED COUNT: 11.8 % (ref 11–15)
HCT VFR BLD AUTO: 39.9 % (ref 35–45)
HGB BLD-MCNC: 13.5 G/DL (ref 11.7–15.5)
LAMOTRIGINE SERPL-MCNC: 3.7 MCG/ML (ref 4–18)
LYMPHOCYTES # BLD AUTO: 1612 CELLS/UL (ref 850–3900)
LYMPHOCYTES NFR BLD AUTO: 26 %
MCH RBC QN AUTO: 30.8 PG (ref 27–33)
MCHC RBC AUTO-ENTMCNC: 33.8 G/DL (ref 32–36)
MCV RBC AUTO: 91.1 FL (ref 80–100)
MONOCYTES # BLD AUTO: 453 CELLS/UL (ref 200–950)
MONOCYTES NFR BLD AUTO: 7.3 %
NEUTROPHILS # BLD AUTO: 3993 CELLS/UL (ref 1500–7800)
NEUTROPHILS NFR BLD AUTO: 64.4 %
PLATELET # BLD AUTO: 243 THOUSAND/UL (ref 140–400)
PMV BLD REES-ECKER: 10.3 FL (ref 7.5–12.5)
RBC # BLD AUTO: 4.38 MILLION/UL (ref 3.8–5.1)
WBC # BLD AUTO: 6.2 THOUSAND/UL (ref 3.8–10.8)

## 2019-01-08 ENCOUNTER — OFFICE VISIT (OUTPATIENT)
Dept: NEUROLOGY | Facility: CLINIC | Age: 24
End: 2019-01-08
Payer: COMMERCIAL

## 2019-01-08 VITALS
HEART RATE: 86 BPM | RESPIRATION RATE: 12 BRPM | BODY MASS INDEX: 20.91 KG/M2 | WEIGHT: 118 LBS | HEIGHT: 63 IN | SYSTOLIC BLOOD PRESSURE: 148 MMHG | DIASTOLIC BLOOD PRESSURE: 86 MMHG

## 2019-01-08 DIAGNOSIS — F41.9 ANXIETY: ICD-10-CM

## 2019-01-08 DIAGNOSIS — G40.209 LOCALIZATION-RELATED FOCAL EPILEPSY WITH COMPLEX PARTIAL SEIZURES (HCC): Primary | ICD-10-CM

## 2019-01-08 PROCEDURE — 99214 OFFICE O/P EST MOD 30 MIN: CPT | Performed by: PHYSICIAN ASSISTANT

## 2019-01-08 RX ORDER — LAMOTRIGINE 200 MG/1
200 TABLET ORAL 2 TIMES DAILY
Qty: 60 TABLET | Refills: 5 | Status: SHIPPED | OUTPATIENT
Start: 2019-01-08 | End: 2019-05-28 | Stop reason: SDUPTHER

## 2019-01-08 NOTE — PATIENT INSTRUCTIONS
Plan:   1 - continue with oxcarbazepine 600mg take one tab twice a day  2 - increase lamotrigine to 200mg take one tablet twice a day   3 - in 2 months check lamotrigine level and CBC, CMP  4 - follow-up with Dr Gavino Taylor in February as already scheduled   5 - still recommend you see a psychiatrist and psychologist to help with moods/anxiety    Please let us know if you need the list of covered providers re-sent to you

## 2019-01-08 NOTE — PROGRESS NOTES
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)  She has had at least one conclusive witnessed seizure that was likely secondary generalized  She continues to have occasional recurrent episodes of visual distortions of the left visual field that are concerning for focal seizures without altered awareness (based on the location of her heterotopia, seizures may involve visual pathways)  She is doing better on current medication regimen  She is currently taking oxcarbazepine 600mg BID and lamotrigine 150mg BID  Oxcarbazepine needed to be reduced from 900mg BID due to side effects while titrating up the lamotrigine  Most recent lamotrigine level is still low (3 7)  Will continue to increase lamotrigine  Ms Perales depression and anxiety continue to interfere with her life  She really needs to establish mental health care and this has been discussed with her several times  After last visit, our  reached out to her and provided her with covered providers  Patient is very overwhelmed with making an appointment  We again discussed that this would be very helpful  She agrees, however taking the first step to make the appointment is her biggest issue  She will continue to try  She denies any SIs currently  Plan:   1 - continue with oxcarbazepine 600mg take one tab twice a day  2 - increase lamotrigine to 200mg take one tablet twice a day   3 - in 2 months check lamotrigine level and CBC, CMP  4 - follow-up with Dr Nancy Ortiz in 2 months  5 - still recommend you see a psychiatrist and psychologist to help with moods/anxiety; discussed in detail again today  No problem-specific Assessment & Plan notes found for this encounter         Diagnoses and all orders for this visit:    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)  -     lamoTRIgine (LaMICtal) 200 MG tablet; Take 1 tablet (200 mg total) by mouth 2 (two) times a day  -     CBC and differential; Future  -     Lamotrigine level; Future  -     Comprehensive metabolic panel; Future    Anxiety           Subjective:    EVERT Worrell is a 21 y o  right handed female here for follow-up evaluation of focal seizures, visual distortions with headache and difficulty dealing with her emotions  Interval history 1/8/2019  Today, patient reports she is overall doing well  After last visit, she had increased her lamotrigine and had some double vision  It was recommended that her oxcarbazepine be decreased to 600 mg b i d  She is currently taking oxcarbazepine 600 mg b i d  And lamotrigine 150 mg b i d  She had labs done just a few days ago with normal CBC and lamotrigine level of 3 7  She says that she has not been having as many visual distortions  No side effects that she notices from the medications  She is having some nausea, mainly associated with anxiety  She continues to have significant anxiety and difficulty dealing with her emotions  After last visit, our  reach Dr Briceño to give her information on psychology and psychiatry services  Patient says that she did not want to look into this any further  She did received the e-mail with all of the cover providers  She notes that taking the steps to make the appointment is her biggest kaela at this time  She reports that she has no suicidal ideations currently  She was cutting herself but has not cut in 3 weeks  AED/side effects/compliance:  Oxcarbazepine 600mg twice a day  Lamotrigine 150mg twice a day  Every now and then she feels dizziness and nausea  No missed doses     Event/Seizure semiology:  1  Lightheaded blurry vision, loss of consciousness, convulsion (Last one 8/24/2016)  2   Blurry vision, circles in a part of her visual field (left side), followed by a period of headache, no clear confusion or disorientation (described in January 2017 visit) - recurs 2-3 times a month     Women of childbearing age:  Contraception: not sexual activity  Folic acid: no - does not want at this point     Prior Epilepsy History:  Initial intake epilepsy history 9/12/2016  Review of hospital records  Ms Supriya Jackson was admitted from 8/24-8/26/2016 to [facility] after a witnessed seizure  She had an MRI brain study that showed cortical malformations including heterotopias, she was subsequently started on levetiracetam 500mg twice a day  Dr Dominguez Fritz did the initial consultation; she reported that her first seizure was in June 2016, onset of lightheadedness, blurry vision, no memory of passing out but she collapsed, the next thing she remembered was waking up in the ambulance  On the day of admission she felt lightheaded, blurry vision, veering to the sides of the aisle and collapsed, her mother witnessed some of the event but only after she fell; patients eyes were open and mouth was moving, arms were tonically flexed and asymmetric and legs were hypertonic with repeated relaxation and tonic stiffening  Again she did not regain consciousness until she was in the ambulance  She had a history of one febrile seizure, a paternal great grandmother with seizures, head CT scan found ventriculomegaly  Patients history  June 2016, Ms Cunningham recalled that she was at work and the next thing she remembered was people standing over her, she did not remember what happened before other than feeling lightheaded and blurry vision  She denied feeling exhausted, new medications, drugs, illness/fevers, and there were no changes in her usual habits  August 2016, again she does not recall much other than being told she had a seizure for less minute  Her mother reported that she did see the end of the seizure; initially Ms Supriya Jackson had her the arms and legs extended outward, then arms were flexed inward, convulsion lasted about 30 minute, and she was unresponsive for about 5 minutes but she was lethargic for about 15-20 minutes afterwards  Her mother had briefly turned away and did not witness the very start of the seizure  Again, there were no alcohol, drugs, medications, or illness experienced by Ms Cunningham  She denies any history of myoclonus, staring spells, automatisms, unexplained hyperkinetic behaviors, olfactory / gustatory hallucinations, epigastric rising events, boogie vu events, visual hallucinations, unexplained nocturnal enuresis, or nocturnal tongue biting  At 17 months, she had a cold with a super high temperature, she had a seizure, deemed to have been a simple febrile seizure  LEV was associated with daily headaches that come and go throughout the day may last for a couple of hours at a time  These are very severe because she does not do well with pain  Headache is best describe as a throbbing sensation for a couple of hours, daily, she does not take any medication for pain because she is not sure if she can  There is no visual changes, no loss of vision, or changes in refraction  Summary 2017  Switched LVT to [facility], better tolerated, over the course of 2017, OXC was escalated for recurrent visual distortions of the left side, circles, blurry vision (sensation of visual obscuration lasting for 2 minutes, left side swirls, if severe enough that she would temporarily not see clearly on the left visual field), lasting minutes without altered awareness or loss of consciousness  There is a headache that follows for about half an hour  These visual distortions have been sporadic once 1-5 months  We had tried gabapentin in the Spring/summer 2017 but she stopped taking it because of side effects / headaches (horrible, feeling like everything was in slow motion)  Sometimes these visual distortions are triggered by stress at work  She works part time as a  4-5 hours   I wrote a letter on her behalf to get a 10-15 minutes break every 2 hours  She reports issues with anxiety, mostly as it involves the possibility of a recurrent seizure  She feels like she has been looking over her shoulder because of her epilepsy  She tried [employer] but found it useless  She did get her 's license back with [place] report of occasional visual seizures  Interval History 3/19/2018  -1200  She continues to have focal visual seizures, visual disturbances of circles on the left visual field followed by a headache, no disorientation or confusion  She reports last one 3/11/2018, another one on 2/2/2018  She feels that she has less headaches and more tolerable  In the past headaches would last half a day, she would take Advil up to 3 tabs  These were not frequent (less than once a month headaches)  She has episodes of dizzy spells when she is at work, one time was related to her focal seizures  Interval history 7/30/2018  -900, -150  OXC [id number] caused cognitive difficulties and imbalance, reduced dose  She started Lyrica  EMU was put on hold until she had a trial of another AED  She was tolerating Lyrica 150mg twice a dayy  At this point, she estimates twice a month  These are circles over the left side of her visual field, lasting 1 minute in duration, followed by a headache lasting 1 hour  She feels that these are happening more frequently  She denies cognitive impairment, staring spell, unresponsiveness, and convulsive activity  She is driving but she has avoided the highways due to her seizures  She has enough of a warning to stop the car or pull over on local streets  She reports that she has been struggling with anxiety and depression  She feels hopeless, helpless, lack of motivation  She denies suicidal ideations or intent at this point      Interval history 10/30/2018  Despite and increase in Lyrica she continued to have visual distortions 3-4/month at the last visit with Fany Arredondo  Lamotrigine was started with weaning of Lyrica  Lamotrigine was causing side effects of "urinary incontinence" but she was placed on two different antibiotics and her symptoms resolved  She denied rash, balance difficulty, worsening headaches, or insomnia  She denies loss of consciousness, altered awareness, or convulsive activity  She reports having had 3-4 episodes of visual distortion lasting 1-2 minutes in the last two months, last one was 10/27/2018  She feels better overall ever since she stopped the Lyrica  She has not made an appointment with a behavioral health specialist due to her anxiety about making an appointment or committing to seeing a behavioral health specialist    She went back to cutting herself  She reports no suicidal ideation and no thoughts of dying  However, she recently found out that she lost a friend to cancer, her friend was in hospice but she was not ready to know about it         Special Features  Status epilepticus: No  Self Injury Seizures: No  Precipitating Factors: None     Epilepsy Risk Factors:  Abnormal pregnancy: No  Abnormal birth/: No  Abnormal Development: walked after a year, slower with language  Febrile seizures, simple: Yes  Febrile seizures, complex: No  CNS infection: No  Mental retardation: No  Cerebral palsy: No  Head injury (moderate/severe): No  CNS neoplasm: No  CNS malformation: Yes - MRI brain found ventriculomegaly, abnormal splenium of corpus callosum, and subependymal heterotopia  Neurosurgical procedure: No  Stroke: No  Alcohol abuse: No  Drug abuse: No  Family history Sz/epilepsy: No     Prior AEDs:  Levetiracetam (headaches), oxcarbazepine, gabapentin (side effect), pregabalin (continues to have visual auras and very emotional), lamotrigine (feels better)     Prior workup:  [id number]   MRI brain  Bilateral subependymal heterotopias in the region of the atria of the lateral ventricles with bilateral colpocephaly  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  Solitary nonspecific FLAIR hyperintensity subcortical white matter right frontal lobe     11/7/2017  MRI brain  Stable colpocephalic appearance to the ventricles, right greater than left, bowing / stretching of the posterior corpus callosum  Subtle foci nodular heterotopia along atria of both lateral ventricles more apparent on the right  Tiny focus of increased signal in the white matter of the right frontal horn is less apparent  Hippocampi are somewhat dysmorphic bilaterally     EEGs:  8/25/2016  Intermittent focal slowing over the right posterior temporal region but limited to the T6 electrode, consider repeating the study     9/23/2016  Photic driving is lateralized to the left; thus, possibly relative neuronal dysfunction in the right posterior region  4/23/2018  Frequent, right posterior temporal delta slowing and occipital sharp activity during 10 Hz photic stimulation       Labs:     Ref Range & Units 1/2/19  8:37 AM Flag   Lamotrigine Lvl 4 0 - 18 0 mcg/mL        Ref Range & Units 1/2/19  8:37 AM   White Blood Cell Count 3 8 - 10 8 Thousand/uL 6 2    Red Blood Cell Count 3 80 - 5 10 Million/uL 4 38    Hemoglobin 11 7 - 15 5 g/dL 13 5    HCT 35 0 - 45 0 % 39 9    MCV 80 0 - 100 0 fL 91 1    MCH 27 0 - 33 0 pg 30 8    MCHC 32 0 - 36 0 g/dL 33 8    RDW 11 0 - 15 0 % 11 8    Platelet Count 487 - 400 Thousand/uL 243    SL AMB MPV 7 5 - 12 5 fL 10 3    Neutrophils (Absolute) 1,500 - 7,800 cells/uL 3,993    Lymphocytes (Absolute) 850 - 3,900 cells/uL 1,612    Monocytes (Absolute) 200 - 950 cells/uL 453    Eosinophils (Absolute) 15 - 500 cells/uL 112    Basophils ABS 0 - 200 cells/uL 31    Neutrophils % 64 4    Lymphocytes % 26 0    Monocytes % 7 3    Eosinophils % 1 8    Basophils PCT % 0 5          The following portions of the patient's history were reviewed and updated as appropriate: current medications, past family history, past medical history, past social history, past surgical history and problem list          Objective:    Blood pressure 148/86, pulse 86, resp  rate 12, height 5' 3" (1 6 m), weight 53 5 kg (118 lb)  Physical Exam   Constitutional: She appears well-developed and well-nourished  HENT:   Head: Normocephalic and atraumatic  Eyes: Pupils are equal, round, and reactive to light  EOM are normal    Cardiovascular: Intact distal pulses  Neurological: She has normal strength and normal reflexes  Coordination normal    Skin: Skin is warm and dry  Psychiatric: Her speech is normal    Anxious at times        Neurological Exam  Mental Status   Oriented to person, place, time and situation  Recent and remote memory are intact  Speech is normal  Language is fluent with no aphasia  Attention and concentration are normal     Cranial Nerves  CN II: Visual fields full to confrontation  CN III, IV, VI: Extraocular movements intact bilaterally  Pupils equal round and reactive to light bilaterally  CN V: Facial sensation is normal   CN VII: Full and symmetric facial movement  CN VIII: Hearing is normal   CN IX, X: Palate elevates symmetrically  Normal gag reflex  CN XI: Shoulder shrug strength is normal   CN XII: Tongue midline without atrophy or fasciculations  Motor   Normal muscle tone  Strength is 5/5 throughout all four extremities  Sensory  Sensation is intact to light touch, pinprick, vibration and proprioception in all four extremities  Reflexes  Deep tendon reflexes are 2+ and symmetric in all four extremities with downgoing toes bilaterally  Coordination  Finger-to-nose, rapid alternating movements and heel-to-shin normal bilaterally without dysmetria  Gait  Casual gait is normal including stance, stride, and arm swing  Past Medical History:   Diagnosis Date    Cerebral ventriculomegaly 8/25/2016    Seizures ([facility] [address] )     Syncopal episodes      History reviewed  No pertinent surgical history      Past Psychiatric History:  Depression: Yes  Anxiety: Yes  Psychosis: No     Medications:     Current Outpatient Prescriptions:     lamoTRIgine (LaMICtal) 150 MG tablet, Take 1 tablet (150 mg total) by mouth 2 (two) times a day    OXcarbazepine (TRILEPTAL) 600 mg tablet, Take one tab twice a day    No Known Allergies    Family History   Problem Relation Age of Onset    No Known Problems Mother     No Known Problems Father     No Known Problems Brother      Paternal PERRY Coates Grandmother started to have seizures later in life  No developmental issues  Parents are healthy    Social History  Living situation:  lives with parents  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, works in retail; 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[address] [employer] [address] [employer]); sometimes she needs to use a ladder  Driving:  active license  She reports that she has never smoked  She has never used smokeless tobacco  She reports that she does not drink alcohol or use drugs  ROS:    Review of Systems   Constitutional: Positive for appetite change  Negative for fever  HENT: Negative  Negative for hearing loss, tinnitus, trouble swallowing and voice change  Eyes: Negative  Negative for photophobia and pain  Respiratory: Negative  Negative for shortness of breath  Cardiovascular: Negative  Negative for palpitations  Gastrointestinal: Positive for nausea  Negative for vomiting  Endocrine: Negative  Negative for cold intolerance and heat intolerance  Genitourinary: Negative  Negative for dysuria, frequency and urgency  Musculoskeletal: Negative  Negative for myalgias and neck pain  Skin: Negative  Negative for rash  Neurological: Negative  Negative for dizziness, tremors, seizures, syncope, facial asymmetry, speech difficulty, weakness, light-headedness, numbness and headaches  Hematological: Negative  Does not bruise/bleed easily  Psychiatric/Behavioral: Negative    Negative for confusion, hallucinations and sleep disturbance       I personally reviewed and updated the ROS as appropriate

## 2019-01-09 ENCOUNTER — TELEPHONE (OUTPATIENT)
Dept: NEUROLOGY | Facility: CLINIC | Age: 24
End: 2019-01-09

## 2019-01-09 NOTE — TELEPHONE ENCOUNTER
Called patient and LMOM  Need to speak with her regarding lamotrigine increase that we discussed yesterday at her visit

## 2019-01-10 NOTE — TELEPHONE ENCOUNTER
Patient called me back  At her visit we discussed increasing her lamotrigine from 150mg BID to 200mg BID  I called to tell her that we want to increase slower  She will take 150mg AM and 200mg PM until she is out of the 150s, and then increase to 200mg BID  She thinks she has 1-2 weeks of 150s left    She will call with any issues

## 2019-02-19 LAB
ALBUMIN SERPL-MCNC: 4.9 G/DL (ref 3.6–5.1)
ALBUMIN/GLOB SERPL: 1.8 (CALC) (ref 1–2.5)
ALP SERPL-CCNC: 71 U/L (ref 33–115)
ALT SERPL-CCNC: 9 U/L (ref 6–29)
AST SERPL-CCNC: 13 U/L (ref 10–30)
BASOPHILS # BLD AUTO: 33 CELLS/UL (ref 0–200)
BASOPHILS NFR BLD AUTO: 0.5 %
BILIRUB SERPL-MCNC: 0.3 MG/DL (ref 0.2–1.2)
BUN SERPL-MCNC: 15 MG/DL (ref 7–25)
BUN/CREAT SERPL: NORMAL (CALC) (ref 6–22)
CALCIUM SERPL-MCNC: 9.9 MG/DL (ref 8.6–10.2)
CHLORIDE SERPL-SCNC: 103 MMOL/L (ref 98–110)
CO2 SERPL-SCNC: 28 MMOL/L (ref 20–32)
CREAT SERPL-MCNC: 0.9 MG/DL (ref 0.5–1.1)
EOSINOPHIL # BLD AUTO: 91 CELLS/UL (ref 15–500)
EOSINOPHIL NFR BLD AUTO: 1.4 %
ERYTHROCYTE [DISTWIDTH] IN BLOOD BY AUTOMATED COUNT: 11.5 % (ref 11–15)
GLOBULIN SER CALC-MCNC: 2.7 G/DL (CALC) (ref 1.9–3.7)
GLUCOSE SERPL-MCNC: 81 MG/DL (ref 65–139)
HCT VFR BLD AUTO: 42 % (ref 35–45)
HGB BLD-MCNC: 13.9 G/DL (ref 11.7–15.5)
LAMOTRIGINE SERPL-MCNC: 5.4 MCG/ML (ref 4–18)
LYMPHOCYTES # BLD AUTO: 1547 CELLS/UL (ref 850–3900)
LYMPHOCYTES NFR BLD AUTO: 23.8 %
MCH RBC QN AUTO: 30.3 PG (ref 27–33)
MCHC RBC AUTO-ENTMCNC: 33.1 G/DL (ref 32–36)
MCV RBC AUTO: 91.7 FL (ref 80–100)
MONOCYTES # BLD AUTO: 501 CELLS/UL (ref 200–950)
MONOCYTES NFR BLD AUTO: 7.7 %
NEUTROPHILS # BLD AUTO: 4329 CELLS/UL (ref 1500–7800)
NEUTROPHILS NFR BLD AUTO: 66.6 %
PLATELET # BLD AUTO: 277 THOUSAND/UL (ref 140–400)
PMV BLD REES-ECKER: 10.2 FL (ref 7.5–12.5)
POTASSIUM SERPL-SCNC: 4.5 MMOL/L (ref 3.5–5.3)
PROT SERPL-MCNC: 7.6 G/DL (ref 6.1–8.1)
RBC # BLD AUTO: 4.58 MILLION/UL (ref 3.8–5.1)
SL AMB EGFR AFRICAN AMERICAN: 104 ML/MIN/1.73M2
SL AMB EGFR NON AFRICAN AMERICAN: 90 ML/MIN/1.73M2
SODIUM SERPL-SCNC: 140 MMOL/L (ref 135–146)
WBC # BLD AUTO: 6.5 THOUSAND/UL (ref 3.8–10.8)

## 2019-02-20 ENCOUNTER — OFFICE VISIT (OUTPATIENT)
Dept: NEUROLOGY | Facility: CLINIC | Age: 24
End: 2019-02-20
Payer: COMMERCIAL

## 2019-02-20 VITALS
DIASTOLIC BLOOD PRESSURE: 78 MMHG | HEIGHT: 63 IN | WEIGHT: 119 LBS | BODY MASS INDEX: 21.09 KG/M2 | HEART RATE: 90 BPM | SYSTOLIC BLOOD PRESSURE: 132 MMHG

## 2019-02-20 DIAGNOSIS — Q07.8: ICD-10-CM

## 2019-02-20 DIAGNOSIS — G40.209 LOCALIZATION-RELATED FOCAL EPILEPSY WITH COMPLEX PARTIAL SEIZURES (HCC): Primary | ICD-10-CM

## 2019-02-20 DIAGNOSIS — F33.1 MODERATE EPISODE OF RECURRENT MAJOR DEPRESSIVE DISORDER (HCC): ICD-10-CM

## 2019-02-20 DIAGNOSIS — R11.0 NAUSEA: ICD-10-CM

## 2019-02-20 DIAGNOSIS — H53.19 VISUAL DISTORTIONS: ICD-10-CM

## 2019-02-20 PROBLEM — R51.9 HEADACHE, UNSPECIFIED HEADACHE TYPE: Status: RESOLVED | Noted: 2018-03-20 | Resolved: 2019-02-20

## 2019-02-20 PROCEDURE — 99214 OFFICE O/P EST MOD 30 MIN: CPT | Performed by: PSYCHIATRY & NEUROLOGY

## 2019-02-20 RX ORDER — LAMOTRIGINE 150 MG/1
TABLET ORAL
COMMUNITY
Start: 2019-02-19 | End: 2019-02-20 | Stop reason: SDUPTHER

## 2019-02-20 RX ORDER — OXCARBAZEPINE 600 MG/1
600 TABLET, FILM COATED ORAL EVERY 12 HOURS SCHEDULED
Qty: 60 TABLET | Refills: 5 | Status: SHIPPED | OUTPATIENT
Start: 2019-02-20 | End: 2019-05-28 | Stop reason: SDUPTHER

## 2019-02-20 RX ORDER — METOCLOPRAMIDE 5 MG/1
5 TABLET ORAL DAILY PRN
Qty: 10 TABLET | Refills: 0 | Status: SHIPPED | OUTPATIENT
Start: 2019-02-20 | End: 2019-09-04

## 2019-02-20 NOTE — PROGRESS NOTES
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)  She has had at least one conclusive witnessed seizure that was likely secondary generalized  She continues to have occasional recurrent episodes of visual distortions of the left visual field that are likely focal aware sensory seizures  She is currently on both oxcarbazepine and lamotrigine, tolerating the medications with intermittent nausea complaint  Her MRI brain study is stable, no worsening of ventriculomegaly and the right frontal FLAIR anomaly is stable  I had offered to decrease oxcarbazepine dose, now that lamotrigine levels are good and she has not experienced another visual seizure  She is concerned about coming off of oxcarbazepine completely as she has not had another convulsive seizure in the last two and a half years  Decreasing oxcarbazepine may lighten the complaint of nausea  The nausea is not frequent and she is willing to treat her nausea symptomatically  I offered metoclopramide, side effects of depression, anxiety, hyperkinetic movements, dystonic reaction were discussed with the patient    Ms Cunningham's depression and anxiety is worse but stable; I had our social workers offer assistance in finding a psychiatrist but at this time she refuses to go see a behavioral health specialist   She was given information about the [facility] crisis hotline should she become more suicidal or worsening of depression  Plan:   1 - continue with lamotrigine 200mg twice a day  2 - continue with oxcarbazepine 600mg twice a day  3 - may try metoclopramide 5mg as needed for nausea trial period; if no reaction and is able to suppress nausea then will write a new script      4 - alternative option is to decrease oxcarbazepine to 300mg twice a day  5 - follow-up in 3 months with LIUDMILA and 6 months with Dr Reyna Mason on to lamotrigine 150mg tabs in case we decide to decrease the dose if nausea is persistent  Problem List Items Addressed This Visit        Nervous and Auditory    Localization-related focal epilepsy with complex partial seizures ([address] ) - Primary    Relevant Medications    OXcarbazepine (TRILEPTAL) 600 mg tablet    Periventricular heterotopia (HCC)       Other    Moderate episode of recurrent major depressive disorder (HCC)    Visual distortions    Nausea    Relevant Medications    metoclopramide (REGLAN) 5 mg tablet        Chief Complaint:    Chief Complaint     Seizures        HPI:    Ernst Manzanares is a 21 y o  right handed female here for follow-up evaluation of focal seizures, visual distortions with headache and difficulty dealing with her emotions  Interval history 2/20/2019  She saw Eriberto Mo about a month ago, she complained that combination of OXC and LMG caused double vision, OXC was decreased and LMG increased  She reported not having the visual distortions  From relatively low lamotrigine level, it was increased to 200mg twice a day and oxcarbazepine dose was decreased  She has not noticed any recurrent visual distortions for quite some time now  She has been experiencing more nausea, more persistently throughout the day, frequent but unable to predict  She denies headaches, insomnia, rash, fevers, tremors, or feeling off balance  She is not ready to be seen by a behavioral health specialist   For the past month, she has not cut herself  She has no energy, no motivation, and anhedonia  She denies suicidal ideation  She does not believe that the depression is any worse with decrease in oxcarbazepine or better with increase in lamotrigine    She is not ready to being evaluated by a psychiatrist     AED/side effects/compliance:  Oxcarbazepine 600mg twice a day  Lamotrigine 200mg twice a day  Every now and then she feels dizziness  No missed doses    Event/Seizure semiology:  1  Lightheaded blurry vision, loss of consciousness, convulsion  (Last one 8/24/2016)  2  Blurry vision, circles in a part of her visual field (left side), followed by a period of headache, no clear confusion or disorientation (described in January 2017 visit) - recurs 2-3 times a month    Women of childbearing age:  Contraception: not sexual activity  Folic acid: no - does not want at this point    Prior Epilepsy History:  Initial intake epilepsy history 9/12/2016  Review of hospital records  Ms Fatou Mejia was admitted from 8/24-8/26/2016 to [facility] after a witnessed seizure  She had an MRI brain study that showed cortical malformations including heterotopias, she was subsequently started on levetiracetam 500mg twice a day  Dr Souleymane Dowling did the initial consultation; she reported that her first seizure was in June 2016, onset of lightheadedness, blurry vision, no memory of passing out but she collapsed, the next thing she remembered was waking up in the ambulance  On the day of admission she felt lightheaded, blurry vision, veering to the sides of the aisle and collapsed, her mother witnessed some of the event but only after she fell; patients eyes were open and mouth was moving, arms were tonically flexed and asymmetric and legs were hypertonic with repeated relaxation and tonic stiffening  Again she did not regain consciousness until she was in the ambulance  She had a history of one febrile seizure, a paternal great grandmother with seizures, head CT scan found ventriculomegaly  Patients history  June 2016, Ms Cunningham recalled that she was at work and the next thing she remembered was people standing over her, she did not remember what happened before other than feeling lightheaded and blurry vision    She denied feeling exhausted, new medications, drugs, illness/fevers, and there were no changes in her usual habits  August 2016, again she does not recall much other than being told she had a seizure for less minute  Her mother reported that she did see the end of the seizure; initially Ms Madelin Monreal had her the arms and legs extended outward, then arms were flexed inward, convulsion lasted about 30 minute, and she was unresponsive for about 5 minutes but she was lethargic for about 15-20 minutes afterwards  Her mother had briefly turned away and did not witness the very start of the seizure  Again, there were no alcohol, drugs, medications, or illness experienced by Ms Cunningham  She denies any history of myoclonus, staring spells, automatisms, unexplained hyperkinetic behaviors, olfactory / gustatory hallucinations, epigastric rising events, boogie vu events, visual hallucinations, unexplained nocturnal enuresis, or nocturnal tongue biting  At 17 months, she had a cold with a super high temperature, she had a seizure, deemed to have been a simple febrile seizure  LEV was associated with daily headaches that come and go throughout the day may last for a couple of hours at a time  These are very severe because she does not do well with pain  Headache is best describe as a throbbing sensation for a couple of hours, daily, she does not take any medication for pain because she is not sure if she can  There is no visual changes, no loss of vision, or changes in refraction  Summary 2017  Switched LVT to [facility], better tolerated, over the course of 2017, OXC was escalated for recurrent visual distortions of the left side, circles, blurry vision (sensation of visual obscuration lasting for 2 minutes, left side swirls, if severe enough that she would temporarily not see clearly on the left visual field), lasting minutes without altered awareness or loss of consciousness  There is a headache that follows for about half an hour    These visual distortions have been sporadic once 1-5 months  We had tried gabapentin in the Spring/summer 2017 but she stopped taking it because of side effects / headaches (horrible, feeling like everything was in slow motion)  Sometimes these visual distortions are triggered by stress at work  She works part time as a  4-5 hours  I wrote a letter on her behalf to get a 10-15 minutes break every 2 hours  She reports issues with anxiety, mostly as it involves the possibility of a recurrent seizure  She feels like she has been looking over her shoulder because of her epilepsy  She tried [employer] but found it useless  She did get her 's license back with [facility] report of occasional visual seizures  Summary 2018  Started with -1200  She continues to have focal visual seizures, visual disturbances of circles on the left visual field followed by a headache, no disorientation or confusion; episodic about 1-2 times a month  We had tried higher dose of OXC but she was having balance and cognitive issues  We tried adding Lyrica, but no improvement, weaned off and started LMG  She is driving but she has avoided the highways due to her seizures  She has enough of a warning to stop the car or pull over on local streets  She reports that she has been struggling with anxiety and depression  She feels hopeless, helpless, lack of motivation  She went back to cutting herself  She reports no suicidal ideation and no thoughts of dying  However, she recently found out that she lost a friend to cancer, her friend was in hospice but she was not ready to know about it    Did not want to see a behavioral health specialist     Special Features  Status epilepticus: No  Self Injury Seizures: No  Precipitating Factors: None    Epilepsy Risk Factors:  Abnormal pregnancy: No  Abnormal birth/: No  Abnormal Development: walked after a year, slower with language  Febrile seizures, simple: Yes  Febrile seizures, complex: No  CNS infection: No  Mental retardation: No  Cerebral palsy: No  Head injury (moderate/severe): No  CNS neoplasm: No  CNS malformation: Yes - MRI brain found ventriculomegaly, abnormal splenium of corpus callosum, and subependymal heterotopia  Neurosurgical procedure: No  Stroke: No  Alcohol abuse: No  Drug abuse: No  Family history Sz/epilepsy: No    Prior AEDs:  Levetiracetam (headaches), oxcarbazepine, gabapentin (side effect), pregabalin (continues to have visual auras and very emotional), lamotrigine (feels better)    Prior workup:      [id number]   MRI brain  Bilateral subependymal heterotopias in the region of the atria of the lateral ventricles with bilateral colpocephaly  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  Solitary nonspecific FLAIR hyperintensity subcortical white matter right frontal lobe    2017  MRI brain  Stable colpocephalic appearance to the ventricles, right greater than left, bowing / stretching of the posterior corpus callosum  Subtle foci nodular heterotopia along atria of both lateral ventricles more apparent on the right  Tiny focus of increased signal in the white matter of the right frontal horn is less apparent  Hippocampi are somewhat dysmorphic bilaterally    EEGs:  2016  Intermittent focal slowing over the right posterior temporal region but limited to the T6 electrode, consider repeating the study    2016  Photic driving is lateralized to the left; thus, possibly relative neuronal dysfunction in the right posterior region  2018  Frequent, right posterior temporal delta slowing and occipital sharp activity during 10 Hz photic stimulation      Labs:  Component      Latest Ref Rng & Units 10/18/2018 2019   White Blood Cell Count      3 8 - 10 8 Thousand/uL 4 9 6 2   Red Blood Cell Count      3 80 - 5 10 Million/uL 4 47 4 38   Hemoglobin      11 7 - 15 5 g/dL 13 6 13 5   HCT      35 0 - 45 0 % 40 9 39 9   Platelet Count      186 - 400 Thousand/uL 243 243   Glucose, Random      65 - 139 mg/dL 81    BUN      7 - 25 mg/dL 14    Creatinine      0 50 - 1 10 mg/dL 0 96    eGFR Non       > OR = 60 mL/min/1 73m2 83    eGFR       > OR = 60 mL/min/1 73m2 97    SL AMB BUN/CREATININE RATIO      6 - 22 (calc) NOT APPLICABLE    Sodium      135 - 146 mmol/L 139    Potassium      3 5 - 5 3 mmol/L 4 3    Chloride      98 - 110 mmol/L 103    CO2      20 - 32 mmol/L 29    SL AMB CALCIUM      8 6 - 10 2 mg/dL 9 8    Total Protein      6 1 - 8 1 g/dL 7 6    Albumin      3 6 - 5 1 g/dL 4 9    Globulin      1 9 - 3 7 g/dL (calc) 2 7    Albumin/Globulin Ratio      1 0 - 2 5 (calc) 1 8    TOTAL BILIRUBIN      0 2 - 1 2 mg/dL 0 3    Alkaline Phosphatase      33 - 115 U/L 57    AST      10 - 30 U/L 13    ALT      6 - 29 U/L 10    LAMOTRIGINE LEVEL      4 0 - 18 0 mcg/mL 3 1 (L) 3 7 (L)   10-HYDROXYCARBAZEPINE      8 0 - 35 0 mcg/mL 34 8      Component      Latest Ref Rng & Units 2/14/2019   White Blood Cell Count      3 8 - 10 8 Thousand/uL 6 5   Red Blood Cell Count      3 80 - 5 10 Million/uL 4 58   Hemoglobin      11 7 - 15 5 g/dL 13 9   HCT      35 0 - 45 0 % 42 0   Platelet Count      256 - 400 Thousand/uL 277   Glucose, Random      65 - 139 mg/dL 81   BUN      7 - 25 mg/dL 15   Creatinine      0 50 - 1 10 mg/dL 0 90   eGFR Non       > OR = 60 mL/min/1 73m2 90   eGFR       > OR = 60 mL/min/1 73m2 104   SL AMB BUN/CREATININE RATIO      6 - 22 (calc) NOT APPLICABLE   Sodium      135 - 146 mmol/L 140   Potassium      3 5 - 5 3 mmol/L 4 5   Chloride      98 - 110 mmol/L 103   CO2      20 - 32 mmol/L 28   SL AMB CALCIUM      8 6 - 10 2 mg/dL 9 9   Total Protein      6 1 - 8 1 g/dL 7 6   Albumin      3 6 - 5 1 g/dL 4 9   Globulin      1 9 - 3 7 g/dL (calc) 2 7   Albumin/Globulin Ratio      1 0 - 2 5 (calc) 1 8   TOTAL BILIRUBIN      0 2 - 1 2 mg/dL 0 3   Alkaline Phosphatase      33 - 115 U/L 71   AST      10 - 30 U/L 13   ALT      6 - 29 U/L 9   LAMOTRIGINE LEVEL      4 0 - 18 0 mcg/mL 5 4     General exam   Blood pressure 132/78, pulse 90, height 5' 3" (1 6 m), weight 54 kg (119 lb)  Appearance: normally developed, appears well  Carotids: n/a  Cardiovascular: regular rate and rhythm, no murmur  Pulmonary: clear to auscultation    HEENT: anicteric and moist mucus membranes / oral cavity   Fundoscopy: not assessed    Mental status  Orientation: alert and oriented to name, place, time  Fund of Knowledge: intact   Attention and Concentration: intact  Current and Remote Memory:intact  Language: spontaneous speech is normal and comprehension is intact    Cranial Nerves  CN 1: not tested  CN 2: pupils equal round reactive to direct and consenual light   CN 3, 4, 6: EOMI, no nystagmus  CN 5:not assessed  CN 7:muscles of facial expression are symmetric  CN 8:not assessed  CN 9, 10:no dysarthria present  CN 11:not assessed  CN 12:tongue is midline    Motor:  Bulk, Tone: normal bulk, normal tone  Pronation: no pronator drift  Strength: symmetric strength in arms and legs  Abnormal movements: no tremor on examination    Sensory:  Lighttouch: intact in all limbs  Romberg:normal    Coordination:  FNF:FNF bilaterally intact  ERICA:intact  FFM:intact   HS: normal  Gait/Station:normal gait and normal tandem gait    Reflexes:  not assessed    Past Medical/Surgical History:  Patient Active Problem List   Diagnosis    Anxiety    Congen[facility] ventriculomegaly [facility] [address] )    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)    Moderate episode of recurrent major depressive disorder ([facility] [address] )    Periventricular heterotopia (HCC)    Visual distortions    Nausea     Past Medical History:   Diagnosis Date    Cerebral ventriculomegaly 8/25/2016    Seizures ([facility] [address] )     Syncopal episodes      History reviewed  No pertinent surgical history      Past Psychiatric History:  Depression: Yes  Anxiety: Yes  Psychosis: No    Medications:    Current Outpatient Medications:     lamoTRIgine (LaMICtal) 200 MG tablet, Take 1 tablet (200 mg total) by mouth 2 (two) times a day, Disp: 60 tablet, Rfl: 5    OXcarbazepine (TRILEPTAL) 600 mg tablet, Take 1 tablet (600 mg total) by mouth every 12 (twelve) hours, Disp: 60 tablet, Rfl: 5    metoclopramide (REGLAN) 5 mg tablet, Take 1 tablet (5 mg total) by mouth daily as needed (nausea), Disp: 10 tablet, Rfl: 0    Allergies:  No Known Allergies    Family history:  Family History   Problem Relation Age of Onset    No Known Problems Mother     No Known Problems Father     No Known Problems Brother      Paternal Rubenalyn Manual Grandmother started to have seizures later in life  No developmental issues  Parents are healthy    Social History  Living situation:  lives with parents  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, works in retail; 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[id number] [employer]); sometimes she needs to use a ladder  She has been feeling stressed out, works on a computer for a couple of hours  Driving:  active license  reports that she has never smoked  She has never used smokeless tobacco  She reports that she does not drink alcohol or use drugs  Review of Systems  A review of at least 12 organ/systems was obtained by the medical assistant and reviewed by me, including additional positives/negatives:  Constitutional: Positive for appetite change  Negative for fever  Gastrointestinal: Positive for nausea  Negative for vomiting  Neurological: Positive for dizziness  Negative for tremors, seizures, syncope, facial asymmetry, speech difficulty, weakness, light-headedness, numbness and headaches  Hematological: Negative  Does not bruise/bleed easily  Psychiatric/Behavioral: Negative for confusion, hallucinations and sleep disturbance  The patient is nervous/anxious (Depression)        Decision making was of high-complexity due to the patient's high risk condition (seizures), psychiatric and neuropsychological comorbidities, behavioral problems, memory and cognitive problems and medication side effects  The total amount of time spent with the patient was 30 minutes  More than 50% of this time was devoted to counseling and coordination of care  Issues addressed during this clinic visit included overall management, medication counseling or monitoring (including adverse effects, side effects and risks of antiepileptic medications)     Start time: 10:25AM  End time: 10:55AM

## 2019-02-20 NOTE — PROGRESS NOTES
Review of Systems    {LimROS-complete:29282}      Review of Systems   Constitutional: Positive for appetite change  Negative for fever  HENT: Negative  Negative for hearing loss, tinnitus, trouble swallowing and voice change  Eyes: Negative  Negative for photophobia and pain  Respiratory: Negative  Negative for shortness of breath  Cardiovascular: Negative  Negative for palpitations  Gastrointestinal: Positive for nausea  Negative for vomiting  Endocrine: Negative  Negative for cold intolerance and heat intolerance  Genitourinary: Negative  Negative for dysuria, frequency and urgency  Musculoskeletal: Negative  Negative for myalgias and neck pain  Skin: Negative  Negative for rash  Neurological: Positive for dizziness  Negative for tremors, seizures, syncope, facial asymmetry, speech difficulty, weakness, light-headedness, numbness and headaches  Hematological: Negative  Does not bruise/bleed easily  Psychiatric/Behavioral: Negative for confusion, hallucinations and sleep disturbance  The patient is nervous/anxious (Depression)

## 2019-03-29 ENCOUNTER — TELEPHONE (OUTPATIENT)
Dept: NEUROLOGY | Facility: CLINIC | Age: 24
End: 2019-03-29

## 2019-03-29 DIAGNOSIS — G40.209 LOCALIZATION-RELATED FOCAL EPILEPSY WITH COMPLEX PARTIAL SEIZURES (HCC): Primary | ICD-10-CM

## 2019-03-29 RX ORDER — LAMOTRIGINE 100 MG/1
TABLET ORAL
Qty: 30 TABLET | Refills: 5 | Status: SHIPPED | OUTPATIENT
Start: 2019-03-29 | End: 2019-09-04

## 2019-03-29 NOTE — TELEPHONE ENCOUNTER
Pt called to state that in the last months she's had 3 focal-visual seizures  She states that they were all the same type   She feels that her medications are no longer effective      -no missed meds  -no sleep deprivation  -no recent illness    Confirmed medications:  Oxcarbazepine 600mg BID  Lamotrigine 200mg BID

## 2019-03-29 NOTE — TELEPHONE ENCOUNTER
Will increase lamotrigine dose  Last level was 5 4, so there is room to increase  I wrote a script for lamotrigine 100mg tabs  Take half a tab at bedtime for 7 days, then half a tab twice a day  Continue taking lamotrigine 200mg tab one tab twice a day  Continue with oxcarbazepine  In 3-4 weeks, get CBC, LFT, lamotrigine and oxcarbazepine levels checked

## 2019-04-20 LAB
10OH-CARBAZEPINE SERPL-MCNC: 18.1 MCG/ML (ref 8–35)
BASOPHILS # BLD AUTO: 20 CELLS/UL (ref 0–200)
BASOPHILS NFR BLD AUTO: 0.3 %
EOSINOPHIL # BLD AUTO: 102 CELLS/UL (ref 15–500)
EOSINOPHIL NFR BLD AUTO: 1.5 %
ERYTHROCYTE [DISTWIDTH] IN BLOOD BY AUTOMATED COUNT: 11.8 % (ref 11–15)
HCT VFR BLD AUTO: 40.8 % (ref 35–45)
HCV AB S/CO SERPL IA: 0.01
HCV AB SERPL QL IA: NORMAL
HGB BLD-MCNC: 13.5 G/DL (ref 11.7–15.5)
LAMOTRIGINE SERPL-MCNC: 5.1 MCG/ML (ref 4–18)
LYMPHOCYTES # BLD AUTO: 1720 CELLS/UL (ref 850–3900)
LYMPHOCYTES NFR BLD AUTO: 25.3 %
MCH RBC QN AUTO: 30.3 PG (ref 27–33)
MCHC RBC AUTO-ENTMCNC: 33.1 G/DL (ref 32–36)
MCV RBC AUTO: 91.7 FL (ref 80–100)
MONOCYTES # BLD AUTO: 544 CELLS/UL (ref 200–950)
MONOCYTES NFR BLD AUTO: 8 %
NEUTROPHILS # BLD AUTO: 4413 CELLS/UL (ref 1500–7800)
NEUTROPHILS NFR BLD AUTO: 64.9 %
PLATELET # BLD AUTO: 256 THOUSAND/UL (ref 140–400)
PMV BLD REES-ECKER: 10.3 FL (ref 7.5–12.5)
RBC # BLD AUTO: 4.45 MILLION/UL (ref 3.8–5.1)
WBC # BLD AUTO: 6.8 THOUSAND/UL (ref 3.8–10.8)

## 2019-04-24 ENCOUNTER — TELEPHONE (OUTPATIENT)
Dept: NEUROLOGY | Facility: CLINIC | Age: 24
End: 2019-04-24

## 2019-05-06 ENCOUNTER — TELEPHONE (OUTPATIENT)
Dept: NEUROLOGY | Facility: CLINIC | Age: 24
End: 2019-05-06

## 2019-05-28 ENCOUNTER — OFFICE VISIT (OUTPATIENT)
Dept: NEUROLOGY | Facility: CLINIC | Age: 24
End: 2019-05-28
Payer: COMMERCIAL

## 2019-05-28 VITALS
HEART RATE: 84 BPM | DIASTOLIC BLOOD PRESSURE: 88 MMHG | SYSTOLIC BLOOD PRESSURE: 138 MMHG | WEIGHT: 118.6 LBS | BODY MASS INDEX: 21.02 KG/M2 | HEIGHT: 63 IN

## 2019-05-28 DIAGNOSIS — M25.561 RIGHT KNEE PAIN, UNSPECIFIED CHRONICITY: ICD-10-CM

## 2019-05-28 DIAGNOSIS — G40.209 LOCALIZATION-RELATED FOCAL EPILEPSY WITH COMPLEX PARTIAL SEIZURES (HCC): Primary | ICD-10-CM

## 2019-05-28 DIAGNOSIS — G89.29 CHRONIC RIGHT-SIDED LOW BACK PAIN, WITH SCIATICA PRESENCE UNSPECIFIED: ICD-10-CM

## 2019-05-28 DIAGNOSIS — M54.5 CHRONIC RIGHT-SIDED LOW BACK PAIN, WITH SCIATICA PRESENCE UNSPECIFIED: ICD-10-CM

## 2019-05-28 DIAGNOSIS — R51.9 HEADACHE, UNSPECIFIED HEADACHE TYPE: ICD-10-CM

## 2019-05-28 PROBLEM — M54.50 CHRONIC RIGHT-SIDED LOW BACK PAIN: Status: ACTIVE | Noted: 2019-05-28

## 2019-05-28 PROCEDURE — 99214 OFFICE O/P EST MOD 30 MIN: CPT | Performed by: NURSE PRACTITIONER

## 2019-05-28 RX ORDER — LAMOTRIGINE 200 MG/1
200 TABLET ORAL 2 TIMES DAILY
Qty: 60 TABLET | Refills: 5 | Status: SHIPPED | OUTPATIENT
Start: 2019-05-28 | End: 2019-09-04

## 2019-05-28 RX ORDER — NAPROXEN 500 MG/1
500 TABLET ORAL 2 TIMES DAILY PRN
Qty: 30 TABLET | Refills: 3 | Status: SHIPPED | OUTPATIENT
Start: 2019-05-28 | End: 2019-09-04

## 2019-05-28 RX ORDER — ZONISAMIDE 100 MG/1
100 CAPSULE ORAL
Qty: 30 CAPSULE | Refills: 5 | Status: SHIPPED | OUTPATIENT
Start: 2019-05-28 | End: 2019-09-04

## 2019-05-28 RX ORDER — OXCARBAZEPINE 600 MG/1
600 TABLET, FILM COATED ORAL EVERY 12 HOURS SCHEDULED
Qty: 60 TABLET | Refills: 5 | Status: SHIPPED | OUTPATIENT
Start: 2019-05-28 | End: 2019-09-04

## 2019-09-04 ENCOUNTER — OFFICE VISIT (OUTPATIENT)
Dept: NEUROLOGY | Facility: CLINIC | Age: 24
End: 2019-09-04
Payer: COMMERCIAL

## 2019-09-04 VITALS
BODY MASS INDEX: 20.73 KG/M2 | WEIGHT: 117 LBS | HEIGHT: 63 IN | HEART RATE: 85 BPM | DIASTOLIC BLOOD PRESSURE: 79 MMHG | SYSTOLIC BLOOD PRESSURE: 141 MMHG

## 2019-09-04 DIAGNOSIS — R51.9 HEADACHE, UNSPECIFIED HEADACHE TYPE: ICD-10-CM

## 2019-09-04 DIAGNOSIS — R11.0 NAUSEA: ICD-10-CM

## 2019-09-04 DIAGNOSIS — F41.9 ANXIETY: ICD-10-CM

## 2019-09-04 DIAGNOSIS — H53.19 VISUAL DISTORTIONS: ICD-10-CM

## 2019-09-04 DIAGNOSIS — F33.1 MODERATE EPISODE OF RECURRENT MAJOR DEPRESSIVE DISORDER (HCC): ICD-10-CM

## 2019-09-04 DIAGNOSIS — G40.209 LOCALIZATION-RELATED FOCAL EPILEPSY WITH COMPLEX PARTIAL SEIZURES (HCC): Primary | ICD-10-CM

## 2019-09-04 DIAGNOSIS — Z72.89 SELF-INJURIOUS BEHAVIOR: ICD-10-CM

## 2019-09-04 PROCEDURE — 99215 OFFICE O/P EST HI 40 MIN: CPT | Performed by: PSYCHIATRY & NEUROLOGY

## 2019-09-04 RX ORDER — OXCARBAZEPINE 600 MG/1
600 TABLET, FILM COATED ORAL EVERY 12 HOURS SCHEDULED
Qty: 60 TABLET | Refills: 5 | Status: SHIPPED | OUTPATIENT
Start: 2019-09-04 | End: 2020-03-10 | Stop reason: SDUPTHER

## 2019-09-04 RX ORDER — SERTRALINE HYDROCHLORIDE 25 MG/1
25 TABLET, FILM COATED ORAL DAILY
Qty: 30 TABLET | Refills: 5 | Status: SHIPPED | OUTPATIENT
Start: 2019-09-04 | End: 2019-11-14

## 2019-09-04 RX ORDER — ONDANSETRON 4 MG/1
4 TABLET, FILM COATED ORAL EVERY 8 HOURS PRN
Qty: 20 TABLET | Refills: 0 | Status: SHIPPED | OUTPATIENT
Start: 2019-09-04 | End: 2020-09-21

## 2019-09-04 RX ORDER — LAMOTRIGINE 200 MG/1
TABLET ORAL
Qty: 60 TABLET | Refills: 5
Start: 2019-09-04 | End: 2019-09-30

## 2019-09-04 RX ORDER — ZONISAMIDE 100 MG/1
100 CAPSULE ORAL
Qty: 30 CAPSULE | Refills: 5 | Status: SHIPPED | OUTPATIENT
Start: 2019-09-04 | End: 2020-03-10 | Stop reason: SDUPTHER

## 2019-09-04 NOTE — PROGRESS NOTES
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)  She has had at least one conclusive witnessed seizure that was likely secondary generalized  She continues to have occasional recurrent episodes of visual distortions of the left visual field that are likely focal aware sensory seizures  For the past three years, we have been trying to eliminate her visual focal seizures (no loss of awareness) with additional antiepileptic medications  We have thus far been unsuccessful with finding a combination of AEDs to prevent these focal seizures  She has had side effects related to lamotrigine without improvement in visual seizures  She had declined prior request for inpatient video EEG monitoring studies to confirm that she has been having visual focal seizures  During this time, we have noticed an increase in psychological distress, anxiety, difficulty with coping, and major depression  We have tried to guide to to seeing a mental health specialist, but she refused to find one or she was not psychologically ready  During today's visit, I recommend that we continue with zonisamide and start to wean off of lamotrigine  We can hold off on increasing zonisamide dose as she continues to experience visual focal seizures  I reviewed the side effects of zonisamide  I also addressed that she really needs to see a therapist to help her manage her stress and depression  She may start with a psychologist or licensed therapist before seeing a psychiatrist   Because she has been avoiding mental health care, I discussed the option of starting an antidepressant medication such as an SSRI    I reviewed the side effects of sertraline, starting at a low dose, may not notice a significant improvement until dose is increase or over the course of months  I reviewed the risk of serotonin syndrome with use of oxcarbazepine and sertraline, but at this point, I believe it is necessary for her to start an SSRI  Our  had performed an extensive review and number of telephone calls to mental health providers associated with the patient's insurance plan  I copied the list of names of specialists generated by Luz Yeager and gave the list to the patient, for the patient to initiate scheduling an appointment  Plan:   1 - lamotrigine 200mg tabs split tabs in half, take half a tab 3 times a day (8AM, 4PM, 10PM) for 2 weeks, then take half a tab twice a day, call the office in 4 weeks for further instructions (will send updated prescription depending on if we want to keep you at 100mg twice a day or complete the weaning off process)  Ultimately, the plan is to take lamotrigine 300mg total daily for 2 weeks (either 150mg twice a day or 100mg three times a day)  2 - continue with oxcarbazepine 600mg tab take one tab twice a day  3 - continue with zonisamide 100mg cap take one cap at bedtime  4 - continue to monitor the frequency of visual aura, if they return then will increase zonisamide to 200mg at bedtime  5 - will start with sertraline 25mg daily  Monitor for drug rash, myoclonus, body twitching, delirium, suicidal ideation, blurry vision, and manic behaviors  If no improvement in depressive symptoms by the next visit, will increase sertraline dose to 50mg daily  6 - may use Zofran 4mg as needed for nausea, but consider evaluation by PCP for heart burn, acid reflux      7 - check BMP  8 - please schedule an appointment with one of the therapist below  9 - follow-up in 3 months with LIUDMILA and Dr John Rivera in 6 months    Problem List Items Addressed This Visit        Nervous and Auditory    Localization-related focal epilepsy with complex partial seizures ([facility] [address] ) - Primary    Relevant Medications    OXcarbazepine (TRILEPTAL) 600 mg tablet    zonisamide (ZONEGRAN) 100 mg capsule    lamoTRIgine (LaMICtal) 200 MG tablet    Other Relevant Orders    Basic metabolic panel    Ambulatory referral to Psychology       Other    Anxiety    Moderate episode of recurrent major depressive disorder (HCC)    Relevant Medications    sertraline (ZOLOFT) 25 mg tablet    Other Relevant Orders    Ambulatory referral to Psychology    Headache, unspecified headache type    Relevant Medications    zonisamide (ZONEGRAN) 100 mg capsule    Visual distortions    Nausea    Relevant Medications    ondansetron (ZOFRAN) 4 mg tablet    Self-injurious behavior        Chief Complaint:    Chief Complaint     Seizures        HPI:    Liberty Garcia is a 25 y o  right handed female here for follow-up evaluation of focal seizures, visual distortions with headache and difficulty dealing with her emotions  Interval history 9/4/2019  She was last seen by Marck Reynolds on 5/28/2019  Continues to have visual auras and headaches about 2-3 times a month  She complains that lamotrigine is making her headaches worse  She was started on zonisamide  She reports that the zonisamide is fine  She has not noticed more headaches  She feels that the auras have gone away for the most part  She is unable to identify her last aura  She has noticed some return of nausea  She has noticed that she is not eating enough  She feels that she has some pressure over the middle portion of her chest, going to her throat, but she is not sure if there is acidic taste and not necessarily heart burn  Every so often she has no appetite, which can be associated with how is her mood  She did not recall if the metoclopramide has been helpful  She is confused about what medications have been helpful and which have had side effects    She has stress from 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, she likes whom she works with but not who she works for) and family members (who don't get her)  She has returned to cutting  behaviors  She said that she should try to see a mental health specialist       AED/side effects/compliance:  Oxcarbazepine 600mg twice a day  Lamotrigine 200mg twice a day  Zonisamide 100mg at bedtime  Every now and then she feels dizziness  No missed doses    Event/Seizure semiology:  1  Lightheaded blurry vision, loss of consciousness, convulsion  (Last one 8/24/2016)  2  Blurry vision, circles in a part of her visual field (left side), followed by a period of headache, no clear confusion or disorientation (described in January 2017 visit) - recurs 2-3 times a month    Women of childbearing age:  Contraception: not sexual activity  Folic acid: no - does not want at this point    Prior Epilepsy History:  Initial intake epilepsy history 9/12/2016  Review of hospital records  Ms Otoniel Romero was admitted from 8/24-8/26/2016 to [facility] after a witnessed seizure  She had an MRI brain study that showed cortical malformations including heterotopias, she was subsequently started on levetiracetam 500mg twice a day  Dr Anu Peralta did the initial consultation; she reported that her first seizure was in June 2016, onset of lightheadedness, blurry vision, no memory of passing out but she collapsed, the next thing she remembered was waking up in the ambulance  On the day of admission she felt lightheaded, blurry vision, veering to the sides of the aisle and collapsed, her mother witnessed some of the event but only after she fell; patients eyes were open and mouth was moving, arms were tonically flexed and asymmetric and legs were hypertonic with repeated relaxation and tonic stiffening  Again she did not regain consciousness until she was in the ambulance  She had a history of one febrile seizure, a paternal great grandmother with seizures, head CT scan found ventriculomegaly  Patients history  June 2016, Ms Cunningham recalled that she was at work and the next thing she remembered was people standing over her, she did not remember what happened before other than feeling lightheaded and blurry vision  She denied feeling exhausted, new medications, drugs, illness/fevers, and there were no changes in her usual habits  August 2016, again she does not recall much other than being told she had a seizure for less minute  Her mother reported that she did see the end of the seizure; initially Ms Kurtis Velázquez had her the arms and legs extended outward, then arms were flexed inward, convulsion lasted about 30 minute, and she was unresponsive for about 5 minutes but she was lethargic for about 15-20 minutes afterwards  Her mother had briefly turned away and did not witness the very start of the seizure  Again, there were no alcohol, drugs, medications, or illness experienced by Ms Cunningham  She denies any history of myoclonus, staring spells, automatisms, unexplained hyperkinetic behaviors, olfactory / gustatory hallucinations, epigastric rising events, boogie vu events, visual hallucinations, unexplained nocturnal enuresis, or nocturnal tongue biting  At 17 months, she had a cold with a super high temperature, she had a seizure, deemed to have been a simple febrile seizure  LEV was associated with daily headaches that come and go throughout the day may last for a couple of hours at a time  These are very severe because she does not do well with pain  Headache is best describe as a throbbing sensation for a couple of hours, daily, she does not take any medication for pain because she is not sure if she can  There is no visual changes, no loss of vision, or changes in refraction       Summary 2017  [facility] to [facility], better tolerated, over the course of 2017, OXC was escalated for recurrent visual distortions of the left side, circles, blurry vision (sensation of visual obscuration lasting for 2 minutes, left side swirls, if severe enough that she would temporarily not see clearly on the left visual field), lasting minutes without altered awareness or loss of consciousness  There is a headache that follows for about half an hour  These visual distortions have been sporadic once 1-5 months  We had tried gabapentin in the Spring/summer 2017 but she stopped taking it because of side effects / headaches (horrible, feeling like everything was in slow motion)  Sometimes these visual distortions are triggered by stress at work  She works part time as a  4-5 hours  I wrote a letter on her behalf to get a 10-15 minutes break every 2 hours  She reports issues with anxiety, mostly as it involves the possibility of a recurrent seizure  She feels like she has been looking over her shoulder because of her epilepsy  She tried [employer] but found it useless  She did get her 's license back with [facility] report of occasional visual seizures  Summary 2018  Started with -1200  She continues to have focal visual seizures, visual disturbances of circles on the left visual field followed by a headache, no disorientation or confusion; episodic about 1-2 times a month  We had tried higher dose of OXC but she was having balance and cognitive issues  We tried adding Lyrica, but no improvement, weaned off and started LMG  She is driving but she has avoided the highways due to her seizures  She has enough of a warning to stop the car or pull over on local streets  She reports that she has been struggling with anxiety and depression  She feels hopeless, helpless, lack of motivation  She went back to cutting herself  She reports no suicidal ideation and no thoughts of dying  However, she recently found out that she lost a friend to cancer, her friend was in hospice but she was not ready to know about it  Did not want to see a behavioral health specialist     Interval history 2/20/2019  -600, -200    Combination of OXC and LMG caused double vision  She has not noticed any recurrent visual distortions for quite some time now  She has been experiencing more nausea, more persistently throughout the day, frequent but unable to predict  She is not ready to be seen by a behavioral health specialist   For the past month, she has not cut herself  She has no energy, no motivation, and anhedonia        Special Features  Status epilepticus: No  Self Injury Seizures: No  Precipitating Factors: None    Epilepsy Risk Factors:  Abnormal pregnancy: No  Abnormal birth/: No  Abnormal Development: walked after a year, slower with language  Febrile seizures, simple: Yes  Febrile seizures, complex: No  CNS infection: No  Mental retardation: No  Cerebral palsy: No  Head injury (moderate/severe): No  CNS neoplasm: No  CNS malformation: Yes - MRI brain found ventriculomegaly, abnormal splenium of corpus callosum, and subependymal heterotopia  Neurosurgical procedure: No  Stroke: No  Alcohol abuse: No  Drug abuse: No  Family history Sz/epilepsy: No    Prior AEDs:  Levetiracetam (headaches), oxcarbazepine (continued to have visual aura), gabapentin (side effect), pregabalin (continues to have visual auras and very emotional), lamotrigine (caused more headaches), zonisamide    Prior workup:      [id number]   MRI brain  Bilateral subependymal heterotopias in the region of the atria of the lateral ventricles with bilateral colpocephaly  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  Solitary nonspecific FLAIR hyperintensity subcortical white matter right frontal lobe    2017  MRI brain  Stable colpocephalic appearance to the ventricles, right greater than left, bowing / stretching of the posterior corpus callosum  Subtle foci nodular heterotopia along atria of both lateral ventricles more apparent on the right  Tiny focus of increased signal in the white matter of the right frontal horn is less apparent  Hippocampi are somewhat dysmorphic bilaterally    EEGs:  8/25/2016  Intermittent focal slowing over the right posterior temporal region but limited to the T6 electrode, consider repeating the study    9/23/2016  Photic driving is lateralized to the left; thus, possibly relative neuronal dysfunction in the right posterior region  4/23/2018  Frequent, right posterior temporal delta slowing and occipital sharp activity during 10 Hz photic stimulation  Labs:  Component      Latest Ref Rng & Units 4/17/2019   White Blood Cell Count      3 8 - 10 8 Thousand/uL 6 8   Red Blood Cell Count      3 80 - 5 10 Million/uL 4 45   Hemoglobin      11 7 - 15 5 g/dL 13 5   HCT      35 0 - 45 0 % 40 8   Platelet Count      266 - 400 Thousand/uL 256   LAMOTRIGINE LEVEL      4 0 - 18 0 mcg/mL 5 1   10-HYDROXYCARBAZEPINE      8 0 - 35 0 mcg/mL 18 1     General exam   Blood pressure 141/79, pulse 85, height 5' 3" (1 6 m), weight 53 1 kg (117 lb)    Appearance: normally developed, appears well  Carotids: n/a  Cardiovascular: regular rate and rhythm, no murmur  Pulmonary: clear to auscultation    HEENT: anicteric and moist mucus membranes / oral cavity   Fundoscopy: not assessed    Mental status  Orientation: alert and oriented to name, place, time  Fund of Knowledge: intact   Attention and Concentration: intact  Current and Remote Memory:intact  Language: no aphasia and spontaneous speech is normal    Cranial Nerves  CN 1: not tested  CN 2: pupils equal round reactive to direct and consenual light   CN 3, 4, 6: EOMI, no nystagmus  CN 5:not assessed  CN 7:muscles of facial expression are symmetric  CN 8:not assessed  CN 9, 10:no dysarthria present  CN 11:not assessed  CN 12:tongue is midline    Motor:  Bulk, Tone: normal bulk, normal tone  Pronation: no pronator drift  Strength: Symmetric strength of the arms and legs, no lateralizing weakness     Sensory:  Lighttouch: intact in all limbs  Romberg:not assessed    Coordination:  FNF:FNF bilaterally intact  ERICA:intact  FFM:intact   HS: normal  Gait/Station:normal gait and normal tandem gait    Reflexes:  not assessed    Past Medical/Surgical History:  Patient Active Problem List   Diagnosis    Anxiety    Congenital cerebral ventriculomegaly ([address] )    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)    Moderate episode of recurrent major depressive disorder (HCC)    Periventricular heterotopia (HCC)    Headache, unspecified headache type    Visual distortions    Nausea    Chronic right-sided low back pain    Self-injurious behavior     Past Medical History:   Diagnosis Date    Cerebral ventriculomegaly 8/25/2016    Seizures (HCC)     Syncopal episodes      History reviewed  No pertinent surgical history      Past Psychiatric History:  Depression: Yes  Anxiety: Yes  Psychosis: No    Medications:    Current Outpatient Medications:     lamoTRIgine (LaMICtal) 200 MG tablet, Take half a tab three times a day for 2 weeks, then half a tab twice a day for 2 weeks and call office for instructions, Disp: 60 tablet, Rfl: 5    OXcarbazepine (TRILEPTAL) 600 mg tablet, Take 1 tablet (600 mg total) by mouth every 12 (twelve) hours, Disp: 60 tablet, Rfl: 5    zonisamide (ZONEGRAN) 100 mg capsule, Take 1 capsule (100 mg total) by mouth daily at bedtime, Disp: 30 capsule, Rfl: 5    ondansetron (ZOFRAN) 4 mg tablet, Take 1 tablet (4 mg total) by mouth every 8 (eight) hours as needed for nausea or vomiting, Disp: 20 tablet, Rfl: 0    sertraline (ZOLOFT) 25 mg tablet, Take 1 tablet (25 mg total) by mouth daily, Disp: 30 tablet, Rfl: 5    Allergies:  No Known Allergies    Family history:  Family History   Problem Relation Age of Onset    No Known Problems Mother     No Known Problems Father     No Known Problems Brother      Paternal Gregorio Pang Grandmother started to have seizures later in life  No developmental issues  Parents are healthy    Social History  Living situation:  lives with parents  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, works in retail; 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[address]); sometimes she needs to use a ladder  She has been feeling stressed out, works on a computer for a couple of hours  Driving:  active license  reports that she has never smoked  She has never used smokeless tobacco  She reports that she drinks alcohol  She reports that she does not use drugs  Review of Systems  A review of at least 12 organ/systems was obtained by the medical assistant and reviewed by me, including additional positives/negatives:  Gastrointestinal: Positive for nausea  Negative for vomiting  Neurological: Positive for dizziness and headaches  Negative for tremors, seizures, syncope, facial asymmetry, speech difficulty, weakness, light-headedness and numbness  Balance problems   Decision making was of high-complexity due to the patient's high risk condition (seizures), psychiatric and neuropsychological comorbidities, behavioral problems, memory and cognitive problems and medication side effects  The total amount of time spent with the patient was 45 minutes  More than 50% of this time was devoted to counseling and coordination of care  Issues addressed during this clinic visit included overall management, medication counseling or monitoring (including adverse effects, side effects and risks of antiepileptic medications)     Start time: 10AM  End time: 10:45AM

## 2019-09-04 NOTE — PATIENT INSTRUCTIONS
PLAN:  1 - lamotrigine 200mg tabs split tabs in half, take half a tab 3 times a day (8AM, 4PM, 10PM) for 2 weeks, then take half a tab twice a day, call the office in 4 weeks for further instructions (will send updated prescription depending on if we want to keep you at 100mg twice a day or complete the weaning off process)  Ultimately, the plan is to take lamotrigine 300mg total daily for 2 weeks (either 150mg twice a day or 100mg three times a day)  2 - continue with oxcarbazepine 600mg tab take one tab twice a day  3 - continue with zonisamide 100mg cap take one cap at bedtime  4 - continue to monitor the frequency of visual aura, if they return then will increase zonisamide to 200mg at bedtime  5 - will start with sertraline 25mg daily  Monitor for drug rash, myoclonus, body twitching, delirium, suicidal ideation, blurry vision, and manic behaviors  6 - may use Zofran 4mg as needed for nausea, but consider evaluation by PCP for heart burn, acid reflux  7 - check BMP  8 - please schedule an appointment with one of the therapist below  9 - follow-up in 3 months with AP Marget Goodell) and Dr Pavan Romero in 6 months    Psychologists     1) Rudi C[address], [address] [address]     2) [address]  [phone]     3) Fernie Sergio  [address]  ([address])  [place]  [phone]     Counselors     1) Ruddy Raza  [address] [address]     2) [facility]  [address]    [place]  [phone]     3) Lauryn Garcia  Part of [facility]  [address]    [place]  [phone]

## 2019-09-04 NOTE — PROGRESS NOTES
Review of Systems    {LimROS-complete:31274}      Review of Systems   Constitutional: Negative  Negative for appetite change and fever  HENT: Negative  Negative for hearing loss, tinnitus, trouble swallowing and voice change  Eyes: Negative  Negative for photophobia and pain  Respiratory: Negative  Negative for shortness of breath  Cardiovascular: Negative  Negative for palpitations  Gastrointestinal: Positive for nausea  Negative for vomiting  Endocrine: Negative  Negative for cold intolerance and heat intolerance  Genitourinary: Negative  Negative for dysuria, frequency and urgency  Musculoskeletal: Negative  Negative for myalgias and neck pain  Skin: Negative  Negative for rash  Neurological: Positive for dizziness and headaches  Negative for tremors, seizures, syncope, facial asymmetry, speech difficulty, weakness, light-headedness and numbness  Balance problems   Hematological: Negative  Does not bruise/bleed easily  Psychiatric/Behavioral: Negative  Negative for confusion, hallucinations and sleep disturbance

## 2019-09-09 PROBLEM — Z72.89 SELF-INJURIOUS BEHAVIOR: Status: ACTIVE | Noted: 2019-09-09

## 2019-09-30 ENCOUNTER — TELEPHONE (OUTPATIENT)
Dept: NEUROLOGY | Facility: CLINIC | Age: 24
End: 2019-09-30

## 2019-09-30 DIAGNOSIS — G40.209 LOCALIZATION-RELATED FOCAL EPILEPSY WITH COMPLEX PARTIAL SEIZURES (HCC): Primary | ICD-10-CM

## 2019-09-30 RX ORDER — LAMOTRIGINE 25 MG/1
TABLET ORAL
Qty: 84 TABLET | Refills: 0 | Status: SHIPPED | OUTPATIENT
Start: 2019-09-30 | End: 2020-03-10

## 2019-09-30 NOTE — TELEPHONE ENCOUNTER
Patient was in on 9/4 for office visit and was advised to follow up in 4 weeks for further instructions regarding meds  Patient reports she is now taking lamotrigine 200 mg,1/2 tab BID  25 mg sertraline daily, she reports she initially had some GI upset when first starting this but that has resolved  zonisamide 100 mg at bed (has not needed to increase dose)  oxcarbamazpine 600 mg BID    Patient reports she feels well, denies any SE  If keeping patient on lamotrigine, patient will need updated rx sent  Please advise  [phone]  Okay to leave detailed message

## 2019-09-30 NOTE — TELEPHONE ENCOUNTER
We can continue to wean her off of lamotrigine  I'll send a script for lamotrigine 25mg tabs, she can start with three tabs twice a day for 1 week, then two tabs twice a day for 1 week, then one tab twice a day for 1 week then stop

## 2019-11-13 LAB
BUN SERPL-MCNC: 13 MG/DL (ref 7–25)
BUN/CREAT SERPL: NORMAL (CALC) (ref 6–22)
CALCIUM SERPL-MCNC: 9.7 MG/DL (ref 8.6–10.2)
CHLORIDE SERPL-SCNC: 105 MMOL/L (ref 98–110)
CO2 SERPL-SCNC: 25 MMOL/L (ref 20–32)
CREAT SERPL-MCNC: 0.92 MG/DL (ref 0.5–1.1)
GLUCOSE SERPL-MCNC: 90 MG/DL (ref 65–99)
POTASSIUM SERPL-SCNC: 4.2 MMOL/L (ref 3.5–5.3)
SL AMB EGFR AFRICAN AMERICAN: 101 ML/MIN/1.73M2
SL AMB EGFR NON AFRICAN AMERICAN: 87 ML/MIN/1.73M2
SODIUM SERPL-SCNC: 138 MMOL/L (ref 135–146)

## 2019-11-14 ENCOUNTER — TELEPHONE (OUTPATIENT)
Dept: NEUROLOGY | Facility: CLINIC | Age: 24
End: 2019-11-14

## 2019-11-14 DIAGNOSIS — F33.1 MODERATE EPISODE OF RECURRENT MAJOR DEPRESSIVE DISORDER (HCC): ICD-10-CM

## 2019-11-14 NOTE — TELEPHONE ENCOUNTER
Spoke with patient and made her aware  She has not scheduled and is reluctant to do so  I did explain and reinforce the importance of doing so

## 2019-11-14 NOTE — TELEPHONE ENCOUNTER
Recommend increasing sertraline to 50mg daily, again effect of SSRI not noticed until 6-8 weeks  She can take two 25mg tabs daily, next refill is for sertraline 50mg tabs take one tab daily  She really needs to see a behavioral health specialist     Medication may not help  Did she call any of the psychologist or counselors that I gave her (see AVS from last office visit)? She won't get better until she sees one of them for counseling sessions

## 2019-11-14 NOTE — TELEPHONE ENCOUNTER
Patient returned call and was made aware of results  Patient added that her medication Zoloft is not helping and is not effective please advise

## 2019-11-14 NOTE — TELEPHONE ENCOUNTER
----- Message from Maria Dolores Esparza MD sent at 11/14/2019  8:09 AM EST -----  Kidney function and sodium level is fine

## 2019-12-02 NOTE — PROGRESS NOTES
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)   patient with occasional recurrent episodes of visual distortions on the left field, that are likely for focal aware sensory seizures  She did have at least 1 conclusive witnessed seizure that was likely secondary generalized  Patient has been on a combination of multiple AEDs to prevent her focal seizures  Patient focal events seem to be now controlled on her current combination  ---  Patient has declined prior request for inpatient video EEG monitoring  -- patient off lamotrigine   -- continue ox carbamazepine 600 mg twice a day  -- continue zonisamide 100 mg at bedtime  -- discussed with patient potential risk factors related to her seizures  asked that she maintain seizure diary-precautions  --will obtain updated MRI brain     anxiety  -- patient with issues of increased stress and anxiety, which may be contributing to her focal events  --  Currently on Zoloft 50 mg daily  -- patient has evaluation tomorrow with mental health  -- patient has Zofran to be used as needed for her nausea  --labs from November with normal sodium    Weight loss  --patient with unexplained weight loss  Question if related to her her medications, and or just some psychiatric issues  As she does have an evaluation to moral with mental health  --discussed with patient use of nutritional supplements, encouraged her to continue with Ensure, small frequent meals  --will obtain labs to include TSH, B12, folate  --she needs to follow up with her primary care      Fortunately, her visual episodes seem to have improved on her current combination of Trileptal and Zonegran  She felt better back on the Lamictal, discussed possibly returning to that agent, this may have been helping with her psych issues  She does have some anxiety, currently on Zoloft    She is due to see mental health tomorrow, no changes were made today secondary to possibility of further pharmacological management following her psych visit  Unclear if her weight loss is secondary to medications versus psych  I did ask that she call following her evaluation tomorrow given any new medication or changes  No problem-specific Assessment & Plan notes found for this encounter  Diagnoses and all orders for this visit:    Localization-related focal epilepsy with complex partial seizures ([facility] [address] )  -     Vitamin B12; Future  -     Folate; Future  -     CBC and differential; Future  -     MRI brain seizure wo and w contrast; Future    Anxiety  -     TSH, 3rd generation with Free T4 reflex; Future  -     Vitamin B12; Future  -     Folate; Future  -     CBC and differential; Future    Weight loss  -     TSH, 3rd generation with Free T4 reflex; Future  -     Vitamin B12; Future  -     Folate; Future  -     CBC and differential; Future           Subjective:    EVERT   Herminio Kincaid  Is a 20-year-old right-handed female who presents today for neurologic follow-up for focal seizures, visual distortions with headaches and  Anxiety     interval history 12/03/2019   patient was last seen in September  Patient continued to have visual distortions     She was complaining that lamotrigine was making her headaches worse  She was started on zonisamide  Previous complaints of auras with headaches had improved  She was subsequently titrated off her Lamictal  She is currently on zonisamide 100mg HS and Oxcarbazepine 600mg bid  Today, she states she has had no further episodes of visual distortions  Her headaches in general seem to be fairly well controlled  She has had no interim generalized seizure activity  In speaking with her however, she is somewhat vague, but states that she felt better when on the Lamictal, she is unable to provide any specifics    In retrospect, this may have been helping however with her anxiety  When last seen, she had described issues with increased stress and anxiety  She was subsequently initiated on Zoloft  She called the office with ongoing complaints, her dose was subsequently increased to 50 mg  She is unsure if this is truly of any benefit  Of note, in the interim, a crisis intervention was called to her house secondary to potential SI, she was not taken to the hospital   Today, she denies any such  feelings  She has any evaluation tomorrow with mental health  Today, if anything she was hyperactive  Denies any focal weakness, no headaches, no diplopia  She has had some a gradual weight loss over the past several months, states that she is taking  Ensure  She has not seen her PCP for quite some time , no specific GI complaints other than some intermittent nausea  labs 11/13/19; sodium = 138, GFR normal    AED/side effects/compliance:  Oxcarbazepine 600mg twice a day  Zonisamide 100mg at bedtime  Every now and then she feels dizziness  No missed doses     Event/Seizure semiology:  1  Lightheaded blurry vision, loss of consciousness, convulsion  (Last one 8/24/2016)  2  Blurry vision, circles in a part of her visual field (left side), followed by a period of headache, no clear confusion or disorientation (described in January 2017 visit) - recurs 2-3 times a month     Women of childbearing age:  Contraception: not sexual activity  Folic acid: no - does not want at this point     Prior Epilepsy History:  Initial intake epilepsy history 9/12/2016  Review of hospital records  Ms Sophia Kohli was admitted from 8/24-8/26/2016 to [facility] after a witnessed seizure  She had an MRI brain study that showed cortical malformations including heterotopias, she was subsequently started on levetiracetam 500mg twice a day   she reported that her first seizure was in June 2016, onset of lightheadedness, blurry vision, no memory of passing out but she collapsed, the next thing she remembered was waking up in the ambulance  her mother witnessed some of the event but only after she fell; patients eyes were open and mouth was moving, arms were tonically flexed and asymmetric and legs were hypertonic with repeated relaxation and tonic stiffening  She had a history of one febrile seizure, a paternal great grandmother with seizures, head CT scan found ventriculomegaly  She had another event in August   Patient again remembers feeling lightheaded and dizzy prior  Her mom then describes her the arms and legs extended outward, then arms were flexed inward, convulsion lasted about 30 minute, and she was unresponsive for about 5 minutes but she was lethargic for about 15-20 minutes afterwards  Again, there were no alcohol, drugs, medications, or illness     She denies any history of myoclonus, staring spells, automatisms, unexplained hyperkinetic behaviors, olfactory / gustatory hallucinations, epigastric rising events, boogie vu events, visual hallucinations, unexplained nocturnal enuresis, or nocturnal tongue biting  At 17 months, she had a cold with a super high temperature, she had a seizure, deemed to have been a simple febrile seizure      LEV was associated with daily headaches that come and go throughout the day may last for a couple of hours at a time  These are very severe because she does not do well with pain  Headache is best describe as a throbbing sensation for a couple of hours, daily, she does not take any medication for pain because she is not sure if she can    There is no visual changes, no loss of vision, or changes in refraction       Summary 2017  Switched LVT to OXC, better tolerated, over the course of 2017, OXC was escalated for recurrent visual distortions of the left side, circles, blurry vision (sensation of visual obscuration lasting for 2 minutes, left side swirls, if severe enough that she would temporarily not see clearly on the left visual field), lasting minutes without altered awareness or loss of consciousness  There is a headache that follows for about half an hour  These visual distortions have been sporadic once 1-5 months  We had tried gabapentin in the Spring/summer 2017 but she stopped taking it because of side effects / headaches (horrible, feeling like everything was in slow motion)  Sometimes these visual distortions are triggered by stress at work  She works part time as a  4-5 hours  I wrote a letter on her behalf to get a 10-15 minutes break every 2 hours      She reports issues with anxiety, mostly as it involves the possibility of a recurrent seizure  She feels like she has been looking over her shoulder because of her epilepsy  She tried Web[employer] but found it useless  She did get her 's license back with [facility] report of occasional visual seizures      Summary 2018  Started with -1200  She continues to have focal visual seizures, visual disturbances of circles on the left visual field followed by a headache, no disorientation or confusion; episodic about 1-2 times a month  We had tried higher dose of OXC but she was having balance and cognitive issues  We tried adding Lyrica, but no improvement, weaned off and started LMG  She is driving but she has avoided the highways due to her seizures  She has enough of a warning to stop the car or pull over on local streets  She reports that she has been struggling with anxiety and depression  She feels hopeless, helpless, lack of motivation  She went back to cutting herself  She reports no suicidal ideation and no thoughts of dying  However, she recently found out that she lost a friend to cancer, her friend was in hospice but she was not ready to know about it  Did not want to see a behavioral health specialist      Interval history 2/20/2019  -600, -200    Combination of OXC and LMG caused double vision  She has not noticed any recurrent visual distortions for quite some time now   She has been experiencing more nausea, more persistently throughout the day, frequent but unable to predict  She is not ready to be seen by a behavioral health specialist   For the past month, she has not cut herself    She has no energy, no motivation, and anhedonia        Special Features  Status epilepticus: No  Self Injury Seizures: No  Precipitating Factors: None     Epilepsy Risk Factors:  Abnormal pregnancy: No  Abnormal birth/: No  Abnormal Development: walked after a year, slower with language  Febrile seizures, simple: Yes  Febrile seizures, complex: No  CNS infection: No  Mental retardation: No  Cerebral palsy: No  Head injury (moderate/severe): No  CNS neoplasm: No  CNS malformation: Yes  MRI brain found ventriculomegaly, abnormal splenium of corpus callosum, and subependymal heterotopia  Neurosurgical procedure: No  Stroke: No  Alcohol abuse: No  Drug abuse: No  Family history Sz/epilepsy: No     Prior AEDs:  Levetiracetam (headaches), oxcarbazepine (continued to have visual aura), gabapentin (side effect), pregabalin (continues to have visual auras and very emotional), lamotrigine (caused more headaches), zonisamide     Prior workup:      [id number]   MRI brain  Bilateral subependymal heterotopias in the region of the atria of the lateral ventricles with bilateral colpocephaly  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  Solitary nonspecific FLAIR hyperintensity subcortical white matter right frontal lobe     2017  MRI brain  Stable colpocephalic appearance to the ventricles, right greater than left, bowing / stretching of the posterior corpus callosum  Subtle foci nodular heterotopia along atria of both lateral ventricles more apparent on the right  Tiny focus of increased signal in the white matter of the right frontal horn is less apparent  Hippocampi are somewhat dysmorphic bilaterally     EEGs:  2016  Intermittent focal slowing over the right posterior temporal region but limited to the T6 electrode, consider repeating the study     9/23/2016  Photic driving is lateralized to the left; thus, possibly relative neuronal dysfunction in the right posterior region      4/23/2018  Frequent, right posterior temporal delta slowing and occipital sharp activity during 10 Hz photic stimulation          The following portions of the patient's history were reviewed and updated as appropriate: allergies, current medications, past family history, past medical history, past social history, past surgical history and problem list          Objective:  Current Outpatient Medications   Medication Sig Dispense Refill    ondansetron (ZOFRAN) 4 mg tablet Take 1 tablet (4 mg total) by mouth every 8 (eight) hours as needed for nausea or vomiting 20 tablet 0    OXcarbazepine (TRILEPTAL) 600 mg tablet Take 1 tablet (600 mg total) by mouth every 12 (twelve) hours 60 tablet 5    sertraline (ZOLOFT) 50 mg tablet Take 0 5 tablets (25 mg total) by mouth daily 30 tablet 5    zonisamide (ZONEGRAN) 100 mg capsule Take 1 capsule (100 mg total) by mouth daily at bedtime 30 capsule 5    lamoTRIgine (LaMICtal) 25 mg tablet Take 3 tabs BID x7 days, then 2 tabs BID x7 days, then 1 tab BID x7 days, then stop (Patient not taking: Reported on 12/3/2019) 84 tablet 0     No current facility-administered medications for this visit  Blood pressure 120/80, pulse 76, height 5' 3" (1 6 m), weight 51 7 kg (114 lb)  Physical Exam   Constitutional: She appears well-developed  HENT:   Head: Normocephalic  Eyes: Pupils are equal, round, and reactive to light  Neck: Normal range of motion  Cardiovascular: Normal rate and regular rhythm  Pulmonary/Chest: Effort normal    Musculoskeletal: Normal range of motion  Neurological: Coordination normal    Reflex Scores:       Tricep reflexes are 2+ on the right side and 2+ on the left side         Brachioradialis reflexes are 2+ on the right side and 2+ on the left side  Patellar reflexes are 3+ on the right side and 3+ on the left side  Achilles reflexes are 2+ on the right side and 2+ on the left side  Skin: Skin is warm  Psychiatric: Her speech is normal  Judgment and thought content normal  Her mood appears anxious  She is hyperactive  Cognition and memory are normal  She expresses no suicidal ideation  Neurological Exam  Mental Status  Awake, alert and oriented to person, place and time  Speech is normal  Language is fluent with no aphasia  Cranial Nerves  CN III, IV, VI: Extraocular movements intact bilaterally  Pupils equal round and reactive to light bilaterally  CN V: Facial sensation is normal   CN VII: Full and symmetric facial movement  CN XI: Shoulder shrug strength is normal     Motor  Normal muscle bulk throughout  Normal muscle tone  The following abnormal movements were seen: Patient with mild tremor with activity, left greater than right  Strength is 5/5 in all four extremities except as noted  Right                     Left  Hip flexion                              4+                          5  Plantarflexion                         4+                          5  Dorsiflexion                            4+                          5    Sensory  Sensation is intact to light touch, pinprick, vibration and proprioception in all four extremities  Reflexes                                           Right                      Left  Brachioradialis                    2+                         2+  Triceps                                2+                         2+  Patellar                                3+                         3+  Achilles                                2+                         2+    Coordination  Finger-to-nose, rapid alternating movements and heel-to-shin normal bilaterally without dysmetria  Mild dysmetria noted on left      Gait  Casual gait is normal including stance, stride, and arm swing  ROS:    Review of Systems   Constitutional: Positive for appetite change and unexpected weight change  HENT: Negative for trouble swallowing  Eyes: Negative for photophobia and visual disturbance  Respiratory: Negative  Cardiovascular: Negative  Gastrointestinal: Negative  Endocrine: Negative  Genitourinary: Negative  Musculoskeletal: Positive for arthralgias  Negative for gait problem  Skin: Negative for rash  Allergic/Immunologic: Negative  Neurological: Positive for tremors  Negative for dizziness, seizures, syncope, speech difficulty, light-headedness and headaches  Hematological: Bruises/bleeds easily  Psychiatric/Behavioral: The patient is nervous/anxious

## 2019-12-03 ENCOUNTER — OFFICE VISIT (OUTPATIENT)
Dept: NEUROLOGY | Facility: CLINIC | Age: 24
End: 2019-12-03
Payer: COMMERCIAL

## 2019-12-03 VITALS
HEART RATE: 76 BPM | WEIGHT: 114 LBS | HEIGHT: 63 IN | SYSTOLIC BLOOD PRESSURE: 120 MMHG | BODY MASS INDEX: 20.2 KG/M2 | DIASTOLIC BLOOD PRESSURE: 80 MMHG

## 2019-12-03 DIAGNOSIS — G40.209 LOCALIZATION-RELATED FOCAL EPILEPSY WITH COMPLEX PARTIAL SEIZURES (HCC): Primary | ICD-10-CM

## 2019-12-03 DIAGNOSIS — R63.4 WEIGHT LOSS: ICD-10-CM

## 2019-12-03 DIAGNOSIS — F41.9 ANXIETY: ICD-10-CM

## 2019-12-03 PROCEDURE — 99214 OFFICE O/P EST MOD 30 MIN: CPT | Performed by: NURSE PRACTITIONER

## 2019-12-03 NOTE — PATIENT INSTRUCTIONS
Patient to continue on oxcarbazepine 600 mg twice a day  Continue on zonisamide 100 mg at bedtime  Patient to undergo counseling tomorrow  Will obtain lab work  Call for any breakthrough activity  To follow-up with PCP  Will get updated MRI

## 2019-12-03 NOTE — PROGRESS NOTES
Patient ID: Kaleigh Taylor is a 25 y o  female  Assessment/Plan:    No problem-specific Assessment & Plan notes found for this encounter  {Assess/PlanSmartLinks:79819}       Subjective:    HPI    {[facility] HPI texts:54038}    {Common ambulatory SmartLinks:51287}         Objective:    Blood pressure 120/80, pulse 76, height 5' 3" (1 6 m), weight 51 7 kg (114 lb)  Physical Exam    Neurological Exam      ROS:    Review of Systems   Constitutional: Negative  Negative for appetite change and fever  HENT: Negative  Negative for hearing loss, tinnitus, trouble swallowing and voice change  Eyes: Negative  Negative for photophobia and pain  Respiratory: Negative  Negative for shortness of breath  Cardiovascular: Negative  Negative for palpitations  Gastrointestinal: Negative  Negative for nausea and vomiting  Endocrine: Negative  Negative for cold intolerance and heat intolerance  Genitourinary: Negative  Negative for dysuria, frequency and urgency  Musculoskeletal: Negative  Negative for myalgias and neck pain  Skin: Negative  Negative for rash  Neurological: Negative  Negative for dizziness, tremors, seizures, syncope, facial asymmetry, speech difficulty, weakness, light-headedness, numbness and headaches  Hematological: Negative  Does not bruise/bleed easily  Psychiatric/Behavioral: Negative  Negative for confusion, hallucinations and sleep disturbance

## 2019-12-05 ENCOUNTER — TELEPHONE (OUTPATIENT)
Dept: NEUROLOGY | Facility: CLINIC | Age: 24
End: 2019-12-05

## 2019-12-05 DIAGNOSIS — F40.240 CLAUSTROPHOBIA: ICD-10-CM

## 2019-12-05 DIAGNOSIS — F41.9 ANXIETY: Primary | ICD-10-CM

## 2019-12-05 RX ORDER — DIAZEPAM 5 MG/1
TABLET ORAL
Qty: 1 TABLET | Refills: 0 | Status: SHIPPED | OUTPATIENT
Start: 2019-12-05 | End: 2020-09-21

## 2019-12-05 NOTE — TELEPHONE ENCOUNTER
pt called and states that she is very clausterphobic and she has a MRI scheduled for 12/19  she is asking for medication to help relax her    she has never taken ativan or xanax in the past   please advise  [phone] to leave a detailed message

## 2019-12-06 NOTE — TELEPHONE ENCOUNTER
I wrote for a diazepam 5mg tab take one tab 15-30 minutes prior to MRI brain study for claustrophobia    Diazepam (Valium) anxiolytic, sedating, please do not drive after taking this medication  Please have someone drive her to the MRI location  The [place] PDMP was queried  No red flags were identified  The patient is low risk for abuse of the prescribed diazepam medication  Safe to proceed with prescription

## 2019-12-11 LAB
BASOPHILS # BLD AUTO: 29 CELLS/UL (ref 0–200)
BASOPHILS NFR BLD AUTO: 0.5 %
EOSINOPHIL # BLD AUTO: 81 CELLS/UL (ref 15–500)
EOSINOPHIL NFR BLD AUTO: 1.4 %
ERYTHROCYTE [DISTWIDTH] IN BLOOD BY AUTOMATED COUNT: 11.5 % (ref 11–15)
FOLATE SERPL-MCNC: 15.2 NG/ML
HCT VFR BLD AUTO: 40 % (ref 35–45)
HGB BLD-MCNC: 13.4 G/DL (ref 11.7–15.5)
LYMPHOCYTES # BLD AUTO: 1502 CELLS/UL (ref 850–3900)
LYMPHOCYTES NFR BLD AUTO: 25.9 %
MCH RBC QN AUTO: 30.9 PG (ref 27–33)
MCHC RBC AUTO-ENTMCNC: 33.5 G/DL (ref 32–36)
MCV RBC AUTO: 92.2 FL (ref 80–100)
MONOCYTES # BLD AUTO: 470 CELLS/UL (ref 200–950)
MONOCYTES NFR BLD AUTO: 8.1 %
NEUTROPHILS # BLD AUTO: 3718 CELLS/UL (ref 1500–7800)
NEUTROPHILS NFR BLD AUTO: 64.1 %
PLATELET # BLD AUTO: 266 THOUSAND/UL (ref 140–400)
PMV BLD REES-ECKER: 10.5 FL (ref 7.5–12.5)
RBC # BLD AUTO: 4.34 MILLION/UL (ref 3.8–5.1)
TSH SERPL-ACNC: 1.58 MIU/L
VIT B12 SERPL-MCNC: 663 PG/ML (ref 200–1100)
WBC # BLD AUTO: 5.8 THOUSAND/UL (ref 3.8–10.8)

## 2019-12-19 ENCOUNTER — HOSPITAL ENCOUNTER (OUTPATIENT)
Dept: RADIOLOGY | Age: 24
Discharge: HOME | End: 2019-12-19
Payer: COMMERCIAL

## 2019-12-19 DIAGNOSIS — G40.209 LOCALIZATION-RELATED FOCAL EPILEPSY WITH COMPLEX PARTIAL SEIZURES (HCC): ICD-10-CM

## 2019-12-19 PROCEDURE — A9585 GADOBUTROL INJECTION: HCPCS | Performed by: NURSE PRACTITIONER

## 2019-12-19 PROCEDURE — 70553 MRI BRAIN STEM W/O & W/DYE: CPT

## 2019-12-19 RX ADMIN — GADOBUTROL 5 ML: 604.72 INJECTION INTRAVENOUS at 11:27

## 2019-12-23 ENCOUNTER — TELEPHONE (OUTPATIENT)
Dept: NEUROLOGY | Facility: CLINIC | Age: 24
End: 2019-12-23

## 2019-12-23 NOTE — TELEPHONE ENCOUNTER
Patient called to review MRI results  Please see results under imaging  Any information we can provide to patient?

## 2019-12-24 NOTE — TELEPHONE ENCOUNTER
Please let patient know her MRI is stable, no changes from her prior study, no new evidence of any masses or lesions, no concerns at this point

## 2020-03-09 ENCOUNTER — OFFICE VISIT (OUTPATIENT)
Dept: NEUROLOGY | Facility: CLINIC | Age: 25
End: 2020-03-09
Payer: COMMERCIAL

## 2020-03-09 ENCOUNTER — TELEPHONE (OUTPATIENT)
Dept: NEUROLOGY | Facility: CLINIC | Age: 25
End: 2020-03-09

## 2020-03-09 DIAGNOSIS — G40.209 LOCALIZATION-RELATED FOCAL EPILEPSY WITH COMPLEX PARTIAL SEIZURES (HCC): ICD-10-CM

## 2020-03-09 DIAGNOSIS — R51.9 HEADACHE, UNSPECIFIED HEADACHE TYPE: ICD-10-CM

## 2020-03-09 PROCEDURE — 99214 OFFICE O/P EST MOD 30 MIN: CPT | Performed by: PSYCHIATRY & NEUROLOGY

## 2020-03-09 NOTE — PROGRESS NOTES
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)  She has had at least one conclusive witnessed seizure that was likely secondary generalized  We have been adjusting her AEDs due to the presence of visual auras that we suspect are focal seizures causing visual distortions of the left visual field; she is back on moderate dose of oxcarbazepine (which seems to have prevented recurrent convulsive seizures) and is tolerating a low dose of zonisamide without recurrent focal seizures  She has co-morbid anxiety and depression that seems to be getting worse  She does not believe that sertraline has been helpful in alleviating her symptoms but is not willing to increase its dose  I discussed that she needs to continue with psychological therapy but speak to her therapist if she can recommend a psychiatrist that she works with to manage difficult to control depression/anxiety  The management of mood disorder is out of my realm  Shandra Kern says that she will consider seeing a psychiatrist but it has taken more than a year for her to start to see a counselor  If she is need of a psychotropic medication to manage anxiety and depression, we can start with venlafaxine which has a different MOA than sertraline  Plan:   1 - continue with oxcarbazepine 600mg tab take one tab twice a day  2 - continue with zonisamide 100mg cap take one cap at bedtime  3 - recommend that she decrease sertaline to half a tab 50mg daily for 30 days then stop taking sertraline  4 - may consider starting venlafaxine for her depression and anxiety, but I prefer that she speak to her therapist to find a psychiatrist to work on medication management of her depression/anxiety    5 - follow-up with LIUDMILA in 3 months and Dr El Perez in 6 months  May increase zonisamide based on recurrent visual seizures  Problem List Items Addressed This Visit        Nervous and Auditory    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)    Relevant Medications    zonisamide (ZONEGRAN) 100 mg capsule    OXcarbazepine (TRILEPTAL) 600 mg tablet       Other    Headache, unspecified headache type    Relevant Medications    zonisamide (ZONEGRAN) 100 mg capsule        Chief Complaint:    Chief Complaint     Seizures        HPI:    Theresa Peña is a [address] y o  right handed female here for follow-up evaluation of focal seizures, visual distortions with headache and difficulty dealing with her emotions  Interval history 3/10/2020  She was last seen by Jenaro Overton on 12/3/2019  She was weaned off of lamotrigine due to recurrent headaches and started on zonisamide  She reported no episode of visual distortion  Lamotrigine may have helped her anxiety  Epic was down when the patient was being evaluated  She is not aware of the last time she had a visual aura or visual impairment  She denies recurrent loss of consciousness or altered awareness  She is not sure if zonsiamide has been helpful  She may have felt emotionally better on lamotrigine  She did not want to talk about what happened to her the night that she had a breakdown  She does not remember much  She was on the phone with a friend who apparently called someone  She would only say that the police was called  She has continued with sertraline despite the crisis  She is going through therapy at [facility])  She feels that her depression and anxiety is getting worse  AED/side effects/compliance:  Oxcarbazepine 600mg twice a day  Zonisamide 100mg at bedtime  No missed doses    Event/Seizure semiology:  1    Lightheaded blurry vision, loss of consciousness, convulsion  (Last one 8/24/2016)  2  Blurry vision, circles in a part of her visual field (left side), followed by a period of headache, no clear confusion or disorientation (described in January 2017 visit) - recurs 2-3 times a month    Women of childbearing age:  Contraception: not sexual activity  Folic acid: no - does not want at this point    Prior Epilepsy History:  Initial intake epilepsy history 9/12/2016  Review of hospital records  Ms Tony Barnhart was admitted from 8/24-8/26/2016 to [facility] after a witnessed seizure  She had an MRI brain study that showed cortical malformations including heterotopias, she was subsequently started on levetiracetam 500mg twice a day  Dr Peyton Ferreira did the initial consultation; she reported that her first seizure was in June 2016, onset of lightheadedness, blurry vision, no memory of passing out but she collapsed, the next thing she remembered was waking up in the ambulance  On the day of admission she felt lightheaded, blurry vision, veering to the sides of the aisle and collapsed, her mother witnessed some of the event but only after she fell; patients eyes were open and mouth was moving, arms were tonically flexed and asymmetric and legs were hypertonic with repeated relaxation and tonic stiffening  Again she did not regain consciousness until she was in the ambulance  She had a history of one febrile seizure, a paternal great grandmother with seizures, head CT scan found ventriculomegaly  Patients history  June 2016, Ms Cunningham recalled that she was at work and the next thing she remembered was people standing over her, she did not remember what happened before other than feeling lightheaded and blurry vision  She denied feeling exhausted, new medications, drugs, illness/fevers, and there were no changes in her usual habits  August 2016, again she does not recall much other than being told she had a seizure for less minute    Her mother reported that she did see the end of the seizure; initially Ms Andreia Motley had her the arms and legs extended outward, then arms were flexed inward, convulsion lasted about 30 minute, and she was unresponsive for about 5 minutes but she was lethargic for about 15-20 minutes afterwards  Her mother had briefly turned away and did not witness the very start of the seizure  Again, there were no alcohol, drugs, medications, or illness experienced by Ms Cunningham  She denies any history of myoclonus, staring spells, automatisms, unexplained hyperkinetic behaviors, olfactory / gustatory hallucinations, epigastric rising events, boogie vu events, visual hallucinations, unexplained nocturnal enuresis, or nocturnal tongue biting  At 17 months, she had a cold with a super high temperature, she had a seizure, deemed to have been a simple febrile seizure  LEV was associated with daily headaches that come and go throughout the day may last for a couple of hours at a time  These are very severe because she does not do well with pain  Headache is best describe as a throbbing sensation for a couple of hours, daily, she does not take any medication for pain because she is not sure if she can  There is no visual changes, no loss of vision, or changes in refraction  Summary 2017  Switched LVT to [facility], better tolerated, over the course of 2017, OXC was escalated for recurrent visual distortions of the left side, circles, blurry vision (sensation of visual obscuration lasting for 2 minutes, left side swirls, if severe enough that she would temporarily not see clearly on the left visual field), lasting minutes without altered awareness or loss of consciousness  There is a headache that follows for about half an hour  These visual distortions have been sporadic once 1-5 months  We had tried gabapentin in the Spring/summer 2017 but she stopped taking it because of side effects / headaches (horrible, feeling like everything was in slow motion)    Sometimes these visual distortions are triggered by stress at work  She works part time as a  4-5 hours  I wrote a letter on her behalf to get a 10-15 minutes break every 2 hours  She reports issues with anxiety, mostly as it involves the possibility of a recurrent seizure  She feels like she has been looking over her shoulder because of her epilepsy  She tried Web[employer] but found it useless  She did get her 's license back with [employer] report of occasional visual seizures  Summary 2018  Started with -1200  She continues to have focal visual seizures, visual disturbances of circles on the left visual field followed by a headache, no disorientation or confusion; episodic about 1-2 times a month  We had tried higher dose of OXC but she was having balance and cognitive issues  We tried adding Lyrica, but no improvement, weaned off and started LMG  She is driving but she has avoided the highways due to her seizures  She has enough of a warning to stop the car or pull over on local streets  Struggles with depression and anxiety  Summary 2019  -600, -200  There was no improvement with the addition of lamotrigine to the prevention of visual auras, which continue to be infrequent events (presumed focal visual seizures)  Combination of OXC and LMG caused double vision, along with headaches, nausea  Due to headaches we started her on zonisamide and took her off of lamotrigine  Once off lamotrigine, headaches resolved  She struggles with her emotions including cutting behaviors, anxiety, and depression; however, she refuses to accept referral to seeing a behavioral health specialist   She has stress from 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, she likes whom she works with but not who she works for) and family members (who don't get her)      Special Features  Status epilepticus: No  Self Injury Seizures: No  Precipitating Factors: None    Epilepsy Risk Factors:  Abnormal pregnancy: No  Abnormal birth/: No  Abnormal Development: walked after a year, slower with language  Febrile seizures, simple: Yes  Febrile seizures, complex: No  CNS infection: No  Mental retardation: No  Cerebral palsy: No  Head injury (moderate/severe): No  CNS neoplasm: No  CNS malformation: Yes - MRI brain found ventriculomegaly, abnormal splenium of corpus callosum, and subependymal heterotopia  Neurosurgical procedure: No  Stroke: No  Alcohol abuse: No  Drug abuse: No  Family history Sz/epilepsy: No    Prior AEDs:  Levetiracetam (headaches), oxcarbazepine (continued to have visual aura), gabapentin (side effect), pregabalin (continues to have visual auras and very emotional), lamotrigine (caused more headaches), zonisamide    Prior workup:      [id number]   MRI brain  Bilateral subependymal heterotopias in the region of the atria of the lateral ventricles with bilateral colpocephaly  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  Solitary nonspecific FLAIR hyperintensity subcortical white matter right frontal lobe    2017  MRI brain  Stable colpocephalic appearance to the ventricles, right greater than left, bowing / stretching of the posterior corpus callosum  Subtle foci nodular heterotopia along atria of both lateral ventricles more apparent on the right  Tiny focus of increased signal in the white matter of the right frontal horn is less apparent  Hippocampi are somewhat dysmorphic bilaterally    2019  MRI alonso w/wo  Single white matter hyperintensity in the right anterior superior frontal lobe (unchanged)  Stable colpocephaly of the lateral ventricles, right greater than left  Symmetric hippocampal formations  No heterotopia was identified    EEGs:  2016  Intermittent focal slowing over the right posterior temporal region but limited to the T6 electrode, consider repeating the study    2016  Photic driving is lateralized to the left; thus, possibly relative neuronal dysfunction in the right posterior region  4/23/2018  Frequent, right posterior temporal delta slowing and occipital sharp activity during 10 Hz photic stimulation  Labs:  Component      Latest Ref Rng & Units 11/13/2019 12/10/2019   White Blood Cell Count      3 8 - 10 8 Thousand/uL  5 8   Red Blood Cell Count      3 80 - 5 10 Million/uL  4 34   Hemoglobin      11 7 - 15 5 g/dL  13 4   HCT      35 0 - 45 0 %  40 0   Platelet Count      470 - 400 Thousand/uL  266   Glucose, Random      65 - 99 mg/dL 90    BUN      7 - 25 mg/dL 13    Creatinine      0 50 - 1 10 mg/dL 0 92    eGFR Non       > OR = 60 mL/min/1 73m2 87    eGFR       > OR = 60 mL/min/1 73m2 101    SL AMB BUN/CREATININE RATIO      6 - 22 (calc) NOT APPLICABLE    Sodium      135 - 146 mmol/L 138    Potassium      3 5 - 5 3 mmol/L 4 2    Chloride      98 - 110 mmol/L 105    CO2      20 - 32 mmol/L 25    Calcium      8 6 - 10 2 mg/dL 9 7    Folate      ng/mL  15 2   Vitamin B-12      200 - 1,100 pg/mL  663   TSH W/RFX TO FREE T4      mIU/L  1 58     General exam   Blood pressure 134/67, pulse 70, weight 51 7 kg (114 lb)    Appearance: normally developed, appears well  Carotids: n/a  Cardiovascular: n/a  Pulmonary: n/a    HEENT: anicteric   Fundoscopy: not assessed    Mental status  Orientation: alert and oriented to name, place, time  Fund of Knowledge: intact   Attention and Concentration: intact  Current and Remote Memory:intact  Language: spontaneous speech is normal and comprehension is intact    Cranial Nerves  CN 1: not tested  CN 2: not assessed   CN 3, 4, 6: EOMI, no nystagmus  CN 5:not assessed  CN 7:muscles of facial expression are symmetric  CN 8:not assessed  CN 9, 10:no dysarthria present  CN 11:not assessed  CN 12:not assessed    Motor:  Bulk, Tone: n/a  Pronation: not assessed  Strength: no lateralizing weakness    Sensory:  Lighttouch: not assessed  Romberg:not assessed    Coordination:  FNF:not assessed  ERICA:not assessed  FFM:not assessed   Gait/Station:normal gait    Reflexes:  not assessed    Past Medical/Surgical History:  Patient Active Problem List   Diagnosis    Anxiety    Congenital cerebral ventriculomegaly ([facility] [address] )    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)    Moderate episode of recurrent major depressive disorder (HCC)    Periventricular heterotopia (HCC)    Headache, unspecified headache type    Visual distortions    Nausea    Chronic right-sided low back pain    Self-injurious behavior    Claustrophobia     Past Medical History:   Diagnosis Date    Cerebral ventriculomegaly 8/25/2016    Seizures ([facility] [address] )     Syncopal episodes      No past surgical history on file  Past Psychiatric History:  Depression: Yes  Anxiety: Yes  Psychosis: No    Medications:    Current Outpatient Medications:     diazepam (VALIUM) 5 mg tablet, Take one tab by mouth 15-30 minutes prior to MRI study for claustrophobia, Disp: 1 tablet, Rfl: 0    ondansetron (ZOFRAN) 4 mg tablet, Take 1 tablet (4 mg total) by mouth every 8 (eight) hours as needed for nausea or vomiting, Disp: 20 tablet, Rfl: 0    OXcarbazepine (TRILEPTAL) 600 mg tablet, Take 1 tablet (600 mg total) by mouth every 12 (twelve) hours, Disp: 60 tablet, Rfl: 5    zonisamide (ZONEGRAN) 100 mg capsule, Take 1 capsule (100 mg total) by mouth daily at bedtime, Disp: 30 capsule, Rfl: 5    Allergies:  No Known Allergies    Family history:  Family History   Problem Relation Age of Onset    No Known Problems Mother     No Known Problems Father     No Known Problems Brother      Paternal Claudean Acosta started to have seizures later in life  No developmental issues  Parents are healthy    Social History  Living situation:  lives with parents  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, works in retail; 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[address] [facility] [address] [facility] [address] [address] [employer]); sometimes she needs to use a ladder    She has been feeling stressed out, works on a computer for a couple of hours  Driving:  active license  reports that she has never smoked  She has never used smokeless tobacco  She reports that she drinks alcohol  She reports that she does not use drugs  Review of Systems  Constitutional: negative  Ears, nose, mouth, throat, and face: negative  Respiratory: negative  Cardiovascular: negative  Neurological: negative  Behavioral/Psych: positive for anxiety and bad mood    Decision making was of high-complexity due to the patient's high risk condition (seizures), psychiatric and neuropsychological comorbidities, behavioral problems, memory and cognitive problems and medication side effects

## 2020-03-09 NOTE — TELEPHONE ENCOUNTER
Contacted patient and scheduled follow up on 6/9/2020 9AM w/ Anahi Sepulveda at [facility] and 9/21/2020 10AM w/ Dr Jayesh Archer at [facility]  Summary and appointment cards mailed

## 2020-03-09 NOTE — TELEPHONE ENCOUNTER
System crashed at visit - will need to contact patient and schedule follow up visit     Per Dr Sparkle Maxwell - follow-up in 3 months with AP and Dr Sparkle Maxwell in 6 months      [phone]

## 2020-03-10 RX ORDER — OXCARBAZEPINE 600 MG/1
600 TABLET, FILM COATED ORAL EVERY 12 HOURS SCHEDULED
Qty: 60 TABLET | Refills: 5 | Status: SHIPPED | OUTPATIENT
Start: 2020-03-10 | End: 2020-09-21

## 2020-03-10 RX ORDER — ZONISAMIDE 100 MG/1
100 CAPSULE ORAL
Qty: 30 CAPSULE | Refills: 5 | Status: SHIPPED | OUTPATIENT
Start: 2020-03-10 | End: 2020-09-21

## 2020-03-15 VITALS
HEART RATE: 70 BPM | BODY MASS INDEX: 20.19 KG/M2 | SYSTOLIC BLOOD PRESSURE: 134 MMHG | DIASTOLIC BLOOD PRESSURE: 67 MMHG | WEIGHT: 114 LBS

## 2020-03-15 NOTE — PATIENT INSTRUCTIONS
Plan:   1 - continue with oxcarbazepine 600mg tab take one tab twice a day  2 - continue with zonisamide 100mg cap take one cap at bedtime  3 - recommend that she decrease sertaline to half a tab 50mg daily for 30 days then stop taking sertraline  4 - may consider starting venlafaxine for her depression and anxiety, but I prefer that she speak to her therapist to find a psychiatrist to work on medication management of her depression/anxiety  5 - follow-up with LIUDMILA in 3 months and Dr Flavio Dan in 6 months  May increase zonisamide based on recurrent visual seizures

## 2020-03-16 NOTE — TELEPHONE ENCOUNTER
Pt called in to state that she received after visit summary but it only included her medication list  Patient will come in this week and fill out an JESSICA to have her last office visit released to her

## 2020-04-06 ENCOUNTER — TELEPHONE (OUTPATIENT)
Dept: NEUROLOGY | Facility: CLINIC | Age: 25
End: 2020-04-06

## 2020-04-06 DIAGNOSIS — F33.1 MODERATE EPISODE OF RECURRENT MAJOR DEPRESSIVE DISORDER (HCC): Primary | ICD-10-CM

## 2020-04-06 RX ORDER — VENLAFAXINE HYDROCHLORIDE 37.5 MG/1
37.5 TABLET, EXTENDED RELEASE ORAL
Qty: 30 TABLET | Refills: 3 | Status: SHIPPED | OUTPATIENT
Start: 2020-04-06 | End: 2020-06-09 | Stop reason: DRUGHIGH

## 2020-06-09 ENCOUNTER — OFFICE VISIT (OUTPATIENT)
Dept: NEUROLOGY | Facility: CLINIC | Age: 25
End: 2020-06-09
Payer: COMMERCIAL

## 2020-06-09 VITALS
WEIGHT: 111 LBS | DIASTOLIC BLOOD PRESSURE: 68 MMHG | SYSTOLIC BLOOD PRESSURE: 124 MMHG | HEIGHT: 63 IN | TEMPERATURE: 98.9 F | HEART RATE: 68 BPM | BODY MASS INDEX: 19.67 KG/M2

## 2020-06-09 DIAGNOSIS — G40.209 LOCALIZATION-RELATED FOCAL EPILEPSY WITH COMPLEX PARTIAL SEIZURES (HCC): Primary | ICD-10-CM

## 2020-06-09 DIAGNOSIS — F33.1 MODERATE EPISODE OF RECURRENT MAJOR DEPRESSIVE DISORDER (HCC): ICD-10-CM

## 2020-06-09 DIAGNOSIS — R51.9 HEADACHE, UNSPECIFIED HEADACHE TYPE: ICD-10-CM

## 2020-06-09 PROCEDURE — 99214 OFFICE O/P EST MOD 30 MIN: CPT | Performed by: PHYSICIAN ASSISTANT

## 2020-06-09 RX ORDER — VENLAFAXINE HYDROCHLORIDE 75 MG/1
75 CAPSULE, EXTENDED RELEASE ORAL DAILY
Qty: 30 CAPSULE | Refills: 3 | Status: SHIPPED | OUTPATIENT
Start: 2020-06-09 | End: 2020-09-21

## 2020-07-06 ENCOUNTER — TELEPHONE (OUTPATIENT)
Dept: NEUROLOGY | Facility: CLINIC | Age: 25
End: 2020-07-06

## 2020-07-06 NOTE — TELEPHONE ENCOUNTER
Called patient and left message making patient aware of her new appt time change on 9/21 with Dr Wander Gan  Appt time changed from 10:00 am to 10:20 am  Advised patient to return my call if she has any questions

## 2020-07-09 ENCOUNTER — TELEPHONE (OUTPATIENT)
Dept: NEUROLOGY | Facility: CLINIC | Age: 25
End: 2020-07-09

## 2020-07-09 NOTE — TELEPHONE ENCOUNTER
Pt called and states that Jameson Vaughn ordered labs last month  She has f/u appt w/ Dr Joaquina Mcmillan in Sept  Pt is asking if ok to get these bw done next week or do you want her to wait closer to her f/u appt?       Pt goes [facility]  [facility] [employer] to the address on file per pt's request      [phone] ok to leave detailed message

## 2020-07-13 LAB
BASOPHILS # BLD AUTO: 27 CELLS/UL (ref 0–200)
BASOPHILS NFR BLD AUTO: 0.4 %
EOSINOPHIL # BLD AUTO: 121 CELLS/UL (ref 15–500)
EOSINOPHIL NFR BLD AUTO: 1.8 %
ERYTHROCYTE [DISTWIDTH] IN BLOOD BY AUTOMATED COUNT: 11.7 % (ref 11–15)
HCT VFR BLD AUTO: 41.1 % (ref 35–45)
HGB BLD-MCNC: 13.5 G/DL (ref 11.7–15.5)
LYMPHOCYTES # BLD AUTO: 1474 CELLS/UL (ref 850–3900)
LYMPHOCYTES NFR BLD AUTO: 22 %
MCH RBC QN AUTO: 30.4 PG (ref 27–33)
MCHC RBC AUTO-ENTMCNC: 32.8 G/DL (ref 32–36)
MCV RBC AUTO: 92.6 FL (ref 80–100)
MONOCYTES # BLD AUTO: 429 CELLS/UL (ref 200–950)
MONOCYTES NFR BLD AUTO: 6.4 %
NEUTROPHILS # BLD AUTO: 4650 CELLS/UL (ref 1500–7800)
NEUTROPHILS NFR BLD AUTO: 69.4 %
PLATELET # BLD AUTO: 253 THOUSAND/UL (ref 140–400)
PMV BLD REES-ECKER: 10.5 FL (ref 7.5–12.5)
RBC # BLD AUTO: 4.44 MILLION/UL (ref 3.8–5.1)
WBC # BLD AUTO: 6.7 THOUSAND/UL (ref 3.8–10.8)

## 2020-07-16 LAB
10OH-CARBAZEPINE SERPL-MCNC: 23.8 MCG/ML (ref 8–35)
ALBUMIN SERPL-MCNC: 4.3 G/DL (ref 3.6–5.1)
ALBUMIN/GLOB SERPL: 1.5 (CALC) (ref 1–2.5)
ALP SERPL-CCNC: 83 U/L (ref 31–125)
ALT SERPL-CCNC: 12 U/L (ref 6–29)
AST SERPL-CCNC: 15 U/L (ref 10–30)
BILIRUB SERPL-MCNC: 0.3 MG/DL (ref 0.2–1.2)
BUN SERPL-MCNC: 11 MG/DL (ref 7–25)
BUN/CREAT SERPL: ABNORMAL (CALC) (ref 6–22)
CALCIUM SERPL-MCNC: 9.5 MG/DL (ref 8.6–10.2)
CHLORIDE SERPL-SCNC: 105 MMOL/L (ref 98–110)
CO2 SERPL-SCNC: 26 MMOL/L (ref 20–32)
CREAT SERPL-MCNC: 0.94 MG/DL (ref 0.5–1.1)
GLOBULIN SER CALC-MCNC: 2.9 G/DL (CALC) (ref 1.9–3.7)
GLUCOSE SERPL-MCNC: 105 MG/DL (ref 65–99)
POTASSIUM SERPL-SCNC: 4.2 MMOL/L (ref 3.5–5.3)
PROT SERPL-MCNC: 7.2 G/DL (ref 6.1–8.1)
SL AMB EGFR AFRICAN AMERICAN: 98 ML/MIN/1.73M2
SL AMB EGFR NON AFRICAN AMERICAN: 84 ML/MIN/1.73M2
SODIUM SERPL-SCNC: 139 MMOL/L (ref 135–146)
ZONISAMIDE SERPL-MCNC: 3.6 MCG/ML (ref 10–40)

## 2020-07-21 ENCOUNTER — TELEPHONE (OUTPATIENT)
Dept: NEUROLOGY | Facility: CLINIC | Age: 25
End: 2020-07-21

## 2020-07-21 NOTE — TELEPHONE ENCOUNTER
Nola Corrales, will continue on low dose zonisamide for now  If she continues to have recurrent visual auras, we can increase it  Keep her appt with Dr Ramos Quivers in Sept   Thanks!

## 2020-07-21 NOTE — TELEPHONE ENCOUNTER
----- Message from Stefania Chong PA-C sent at 7/21/2020  1:47 PM EDT -----  Please let patient know the zonisamide level is low  This was her first level since starting the med  Please confirm no missed doses    Please see if any events concerning for seizure, specifically any visual auras

## 2020-07-21 NOTE — TELEPHONE ENCOUNTER
Called patient and made her aware of below  Denies missing any doses  Patient takes Zonisamide 100mg tab, 1 tab HS  She denies seizures but does state she had one aura during a work day but does not remember what day it was  It only lasted about 30 seconds

## 2020-07-23 NOTE — TELEPHONE ENCOUNTER
Fyi: Pt called in to state that upon waking up in the morning she got out of bed and had a moment of dizziness  She states she did not fall but her body was against the wall for a few seconds till she got her balance  Pt states afterwards she was okay but did have a headache yesterday  Pt unsure if this was sz related at all and just wanted to make you aware  Pt denies any missed medications   She states she has gotten dizzy like this in the past  She is unsure if she maybe got out of bed too fast

## 2020-07-23 NOTE — TELEPHONE ENCOUNTER
Like a change in blood pressure  When we change positions from laying, to sitting, to standing, our BP goes down and can cause some dizziness  As long as it resolved quickly, which it sounds like it did  Make sure she is staying very hydrated

## 2020-07-31 NOTE — PATIENT INSTRUCTIONS
PLAN:  1 - continue with lamotrigine 200mg twice a day  2 - continue with oxcarbazepine 600mg twice a day  3 - may try metoclopramide 5mg as needed for nausea trial period; if no reaction and is able to suppress nausea then will write a new script  4 - alternative option is to decrease oxcarbazepine to 300mg twice a day  5 - follow-up in 3 months with LIUDMILA and 6 months with Dr Mario Dai on to lamotrigine 150mg tabs in case we decide to decrease the dose if nausea is persistent 
DISPLAY PLAN FREE TEXT

## 2020-09-16 ENCOUNTER — TELEPHONE (OUTPATIENT)
Dept: NEUROLOGY | Facility: CLINIC | Age: 25
End: 2020-09-16

## 2020-09-17 ENCOUNTER — TELEPHONE (OUTPATIENT)
Dept: NEUROLOGY | Facility: CLINIC | Age: 25
End: 2020-09-17

## 2020-09-21 ENCOUNTER — OFFICE VISIT (OUTPATIENT)
Dept: NEUROLOGY | Facility: CLINIC | Age: 25
End: 2020-09-21
Payer: COMMERCIAL

## 2020-09-21 VITALS
WEIGHT: 109 LBS | BODY MASS INDEX: 19.31 KG/M2 | HEIGHT: 63 IN | TEMPERATURE: 97.6 F | SYSTOLIC BLOOD PRESSURE: 134 MMHG | DIASTOLIC BLOOD PRESSURE: 76 MMHG | HEART RATE: 79 BPM

## 2020-09-21 DIAGNOSIS — R11.0 NAUSEA: ICD-10-CM

## 2020-09-21 DIAGNOSIS — Q07.8: Primary | ICD-10-CM

## 2020-09-21 DIAGNOSIS — F33.1 MODERATE EPISODE OF RECURRENT MAJOR DEPRESSIVE DISORDER (HCC): ICD-10-CM

## 2020-09-21 DIAGNOSIS — G40.209 LOCALIZATION-RELATED FOCAL EPILEPSY WITH COMPLEX PARTIAL SEIZURES (HCC): ICD-10-CM

## 2020-09-21 DIAGNOSIS — R51.9 HEADACHE, UNSPECIFIED HEADACHE TYPE: ICD-10-CM

## 2020-09-21 DIAGNOSIS — F41.9 ANXIETY: ICD-10-CM

## 2020-09-21 PROCEDURE — 99214 OFFICE O/P EST MOD 30 MIN: CPT | Performed by: PSYCHIATRY & NEUROLOGY

## 2020-09-21 RX ORDER — ONDANSETRON 4 MG/1
4 TABLET, FILM COATED ORAL EVERY 8 HOURS PRN
Qty: 20 TABLET | Refills: 0 | Status: SHIPPED | OUTPATIENT
Start: 2020-09-21 | End: 2021-09-24 | Stop reason: SDUPTHER

## 2020-09-21 RX ORDER — VENLAFAXINE HYDROCHLORIDE 75 MG/1
75 CAPSULE, EXTENDED RELEASE ORAL DAILY
Qty: 90 CAPSULE | Refills: 3 | Status: SHIPPED | OUTPATIENT
Start: 2020-09-21 | End: 2021-09-22

## 2020-09-21 RX ORDER — OXCARBAZEPINE 600 MG/1
600 TABLET, FILM COATED ORAL EVERY 12 HOURS SCHEDULED
Qty: 180 TABLET | Refills: 3 | Status: SHIPPED | OUTPATIENT
Start: 2020-09-21 | End: 2021-09-24

## 2020-09-21 NOTE — PATIENT INSTRUCTIONS
Plan:   1 - continue with oxcarbazepine 600mg tab take one tab twice a day  2 - discontinue zonisamide (if concerned about withdrawal, can take 1 tab every other night for 8 days); monitor if weight loss or lack of appetite symptoms persist (if symptoms persists for more than 8 week, unlikely due to medication; but if symptoms resolve and you have another episode of visual aura will consider adding on a different medication  3 - continue with Venlafaxine ER 75mg daily; continue with psychotherapy with therapist to manage depression and anxiety  4 - follow-up with LIUDMILA in 3 months and Dr Francie Bolivar in 6 months

## 2020-09-21 NOTE — PROGRESS NOTES
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) and co-morbid depression and generalized anxiety disorders  She has had at least one convulsive type of seizure, but she had recurrent visual distortions that are concerning for focal visual seizures  There was no improvement with higher doses of oxcarbazepine, but with the addition of low dose zonisamide was able to hold off recurrent focal visual seizures  However, she has persistent complaints of lack of appetite or weight loss that has been troubling to her  During this visit, we discussed the risk and benefit of discontinuing zonisamide  She has had multiple complaints of medication intolerance  Given that she has not had a convulsive type of seizures since she has been on oxcarbazepine, it is reasonable to try her off of a second AED for a period of time to clear up any concerns about medication effects  We can hold off another AED until she has a recurrence of her focal visual seizure  She will continue to see her therapist for management of her anxiety and depression  I won't change her dose of venlafaxine as we are trying to clear up some medication side effects  If her depression or anxiety shows no improvement with psychological counseling then we can increase her dose of venlafaxine      Plan:   1 - continue with oxcarbazepine 600mg tab take one tab twice a day  2 - discontinue zonisamide; monitor if weight loss or lack of appetite symptoms persist (if symptoms persists for more than 8 week, unlikely due to medication; but if symptoms resolve and you have another episode of visual aura will consider adding on a different medication  3 - continue with Venlafaxine ER 75mg daily; continue with psychotherapy with therapist to manage depression and anxiety  4 - follow-up with LIUDMILA in 3 months and Dr Gavino Taylor in 6 months  5 - she may continue with ondansetron for PRN use to manage occasional nausea  Problem List Items Addressed This Visit        Nervous and Auditory    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)    Relevant Medications    OXcarbazepine (TRILEPTAL) 600 mg tablet    Periventricular heterotopia (HCC) - Primary       Other    Anxiety    Moderate episode of recurrent major depressive disorder (HCC)    Relevant Medications    venlafaxine (EFFEXOR-XR) 75 mg 24 hr capsule    Headache, unspecified headache type    Nausea    Relevant Medications    ondansetron (ZOFRAN) 4 mg tablet        Chief Complaint:    Chief Complaint     Seizures        HPI:    Shanique Grey is a 22 y o  right handed female here for follow-up evaluation of focal seizures, visual distortions with headache and difficulty dealing with her emotions  Interval history 9/21/2020  She had a follow-up visit with Tameka Lomeli on 6/9/2020, no recurrent visual auras were reported  Headaches were 2-3 times a month  Recently started Effexor at 37 5mg daily (recommended to increase the dose)  She complained of weight loss  There have been no visual aura to report  There have been no seizure to report  She has been dealing with more stress (complicated work schedule as people were quitting)  She feels that her headaches are not severe  She may feel that she has no appetite  Sometimes she can eat a good amount, and sometimes she is just picking at her food  She loses about 1-2 pounds with each visit  She feel that her clothes are fitting looser  She continues to deal with her anxiety and mood issues  She continues with going to her therapist as much as she can  She has not noticed a significant difference with the higher dose of venlafaxine      She recently noticed some speech problem; she knows what she wants to say, but she struggles with the smoothness of the speech, she did not specify it being slurred speech or stuttering speech  She does not believe anyone really notices the problem with her speech  This is infrequent  AED/side effects/compliance:  Oxcarbazepine 600mg twice a day  Zonisamide 100mg at bedtime  No missed doses    Event/Seizure semiology:  1  Lightheaded blurry vision, loss of consciousness, convulsion  (Last one 8/24/2016)  2  Blurry vision, circles in a part of her visual field (left side), followed by a period of headache, no clear confusion or disorientation (described in January 2017 visit) - recurs 2-3 times a month    Women of childbearing age:  Contraception: not sexual activity  Folic acid: no - does not want at this point    Prior Epilepsy History:  Initial intake epilepsy history 9/12/2016  Review of hospital records  Ms Ashlyn Bush was admitted from 8/24-8/26/2016 to [facility] after a witnessed seizure  She had an MRI brain study that showed cortical malformations including heterotopias, she was subsequently started on levetiracetam 500mg twice a day  Dr Navarro Lee did the initial consultation; she reported that her first seizure was in June 2016, onset of lightheadedness, blurry vision, no memory of passing out but she collapsed, the next thing she remembered was waking up in the ambulance  On the day of admission she felt lightheaded, blurry vision, veering to the sides of the aisle and collapsed, her mother witnessed some of the event but only after she fell; patients eyes were open and mouth was moving, arms were tonically flexed and asymmetric and legs were hypertonic with repeated relaxation and tonic stiffening  Again she did not regain consciousness until she was in the ambulance  She had a history of one febrile seizure, a paternal great grandmother with seizures, head CT scan found ventriculomegaly        Patients history  June 2016, Ms Joaquina Barillas recalled that she was at work and the next thing she remembered was people standing over her, she did not remember what happened before other than feeling lightheaded and blurry vision  She denied feeling exhausted, new medications, drugs, illness/fevers, and there were no changes in her usual habits  August 2016, again she does not recall much other than being told she had a seizure for less minute  Her mother reported that she did see the end of the seizure; initially Ms Joaquina Barillas had her the arms and legs extended outward, then arms were flexed inward, convulsion lasted about 30 minute, and she was unresponsive for about 5 minutes but she was lethargic for about 15-20 minutes afterwards  Her mother had briefly turned away and did not witness the very start of the seizure  Again, there were no alcohol, drugs, medications, or illness experienced by Ms Cunningham  She denies any history of myoclonus, staring spells, automatisms, unexplained hyperkinetic behaviors, olfactory / gustatory hallucinations, epigastric rising events, boogie vu events, visual hallucinations, unexplained nocturnal enuresis, or nocturnal tongue biting  At 17 months, she had a cold with a super high temperature, she had a seizure, deemed to have been a simple febrile seizure  LEV was associated with daily headaches that come and go throughout the day may last for a couple of hours at a time  These are very severe because she does not do well with pain  Headache is best describe as a throbbing sensation for a couple of hours, daily, she does not take any medication for pain because she is not sure if she can  There is no visual changes, no loss of vision, or changes in refraction       Summary 2017  Switched LVT to OXC, better tolerated, over the course of 2017, OXC was escalated for recurrent visual distortions of the left side, circles, blurry vision (sensation of visual obscuration lasting for 2 minutes, left side swirls, if severe enough that she would temporarily not see clearly on the left visual field), lasting minutes without altered awareness or loss of consciousness  There is a headache that follows for about half an hour  These visual distortions have been sporadic once 1-5 months  We had tried gabapentin in the Spring/summer 2017 but she stopped taking it because of side effects / headaches (horrible, feeling like everything was in slow motion)  Sometimes these visual distortions are triggered by stress at work  She works part time as a  4-5 hours  I wrote a letter on her behalf to get a 10-15 minutes break every 2 hours  She reports issues with anxiety, mostly as it involves the possibility of a recurrent seizure  She feels like she has been looking over her shoulder because of her epilepsy  She tried [employer] but found it useless  She did get her 's license back with [facility] report of occasional visual seizures  Summary 2018  Started with -1200  She continues to have focal visual seizures, visual disturbances of circles on the left visual field followed by a headache, no disorientation or confusion; episodic about 1-2 times a month  We had tried higher dose of OXC but she was having balance and cognitive issues  We tried adding Lyrica, but no improvement, weaned off and started LMG  She is driving but she has avoided the highways due to her seizures  She has enough of a warning to stop the car or pull over on local streets  Struggles with depression and anxiety  Summary 2019  -600, -200  There was no improvement with the addition of lamotrigine to the prevention of visual auras, which continue to be infrequent events (presumed focal visual seizures)  Combination of OXC and LMG caused double vision, along with headaches, nausea  Due to headaches we started her on zonisamide and took her off of lamotrigine  Once off lamotrigine, headaches resolved    She struggles with her emotions including cutting behaviors, anxiety, and depression; however, she refuses to accept referral to seeing a behavioral health specialist   She has stress from 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, she likes whom she works with but not who she works for) and family members (who don't get her)  Interval history 3/10/2020  -600,   Weaned off of lamotrigine due to recurrent headaches  She is not aware of the last time she had a visual aura or visual impairment  She denies recurrent loss of consciousness or altered awareness  She is not sure if zonsiamide has been helpful  She may have felt emotionally better on lamotrigine  She is going through therapy at [facility])  She feels that her depression and anxiety is getting worse        Special Features  Status epilepticus: No  Self Injury Seizures: No  Precipitating Factors: None    Epilepsy Risk Factors:  Abnormal pregnancy: No  Abnormal birth/: No  Abnormal Development: walked after a year, slower with language  Febrile seizures, simple: Yes  Febrile seizures, complex: No  CNS infection: No  Mental retardation: No  Cerebral palsy: No  Head injury (moderate/severe): No  CNS neoplasm: No  CNS malformation: Yes - MRI brain found ventriculomegaly, abnormal splenium of corpus callosum, and subependymal heterotopia  Neurosurgical procedure: No  Stroke: No  Alcohol abuse: No  Drug abuse: No  Family history Sz/epilepsy: No    Prior AEDs:  Levetiracetam (headaches), oxcarbazepine (continued to have visual aura), gabapentin (side effect), pregabalin (continues to have visual auras and very emotional), lamotrigine (caused more headaches), zonisamide (complaints of weight loss)    Prior workup:      [id number]   MRI brain  Bilateral subependymal heterotopias in the region of the atria of the lateral ventricles with bilateral colpocephaly  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  Solitary nonspecific FLAIR hyperintensity subcortical white matter right frontal lobe    11/7/2017  MRI brain  Stable colpocephalic appearance to the ventricles, right greater than left, bowing / stretching of the posterior corpus callosum  Subtle foci nodular heterotopia along atria of both lateral ventricles more apparent on the right  Tiny focus of increased signal in the white matter of the right frontal horn is less apparent  Hippocampi are somewhat dysmorphic bilaterally    12/19/2019  MRI alonso w/wo  Single white matter hyperintensity in the right anterior superior frontal lobe (unchanged)  Stable colpocephaly of the lateral ventricles, right greater than left  Symmetric hippocampal formations  No heterotopia was identified    EEGs:  8/25/2016  Intermittent focal slowing over the right posterior temporal region but limited to the T6 electrode, consider repeating the study    9/23/2016  Photic driving is lateralized to the left; thus, possibly relative neuronal dysfunction in the right posterior region  4/23/2018  Frequent, right posterior temporal delta slowing and occipital sharp activity during 10 Hz photic stimulation      Labs:  Component      Latest Ref Rng & Units 7/13/2020   White Blood Cell Count      3 8 - 10 8 Thousand/uL 6 7   Red Blood Cell Count      3 80 - 5 10 Million/uL 4 44   Hemoglobin      11 7 - 15 5 g/dL 13 5   HCT      35 0 - 45 0 % 41 1   Platelet Count      067 - 400 Thousand/uL 253   Glucose, Random      65 - 99 mg/dL 105 (H)   BUN      7 - 25 mg/dL 11   Creatinine      0 50 - 1 10 mg/dL 0 94   eGFR Non       > OR = 60 mL/min/1 73m2 84   eGFR       > OR = 60 mL/min/1 73m2 98   SL AMB BUN/CREATININE RATIO      6 - 22 (calc) NOT APPLICABLE   Sodium      135 - 146 mmol/L 139   Potassium      3 5 - 5 3 mmol/L 4 2   Chloride      98 - 110 mmol/L 105   CO2      20 - 32 mmol/L 26   Calcium      8 6 - 10 2 mg/dL 9 5   Total Protein      6 1 - 8 1 g/dL 7 2   Albumin      3 6 - 5 1 g/dL 4 3   Globulin      1 9 - 3 7 g/dL (calc) 2 9   Albumin/Globulin Ratio      1 0 - 2 5 (calc) 1 5   TOTAL BILIRUBIN      0 2 - 1 2 mg/dL 0 3   Alkaline Phosphatase      31 - 125 U/L 83   AST      10 - 30 U/L 15   ALT      6 - 29 U/L 12   10-HYDROXYCARBAZEPINE      8 0 - 35 0 mcg/mL 23 8   ZONISAMIDE LEVEL      10 0 - 40 0 mcg/mL 3 6 (L)     General exam   Blood pressure 134/76, pulse 79, temperature 97 6 °F (36 4 °C), height 5' 3" (1 6 m), weight 49 4 kg (109 lb)    Appearance: normally developed, appears well  Carotids: not assessed  Cardiovascular: regular rate and rhythm, no murmur  Pulmonary: clear to auscultation    HEENT: anicteric   Fundoscopy: not assessed    Mental status  Orientation: alert and oriented to name, place, time  Fund of Knowledge: intact   Attention and Concentration: intact  Current and Remote Memory:intact  Language: no aphasia, spontaneous speech is normal and comprehension is intact    Cranial Nerves  CN 1: not tested  CN 2: pupils equal round reactive to direct and consenual light   CN 3, 4, 6: EOMI, no nystagmus  CN 5:not assessed  CN 7:not assessed  CN 8:not assessed  CN 9, 10:no dysarthria present  CN 11:not assessed  CN 12:not assessed    Motor:  Bulk, Tone: normal bulk, normal tone  Pronation: no pronator drift  Strength: Symmetric strength of the arms and legs, no lateralizing weakness     Sensory:  Lighttouch: intact in all limbs  Romberg:not assessed    Coordination:  FNF:FNF bilaterally intact  ERICA:intact  FFM:intact   Gait/Station:normal gait and normal tandem gait    Reflexes:  not assessed    Past Medical/Surgical History:  Patient Active Problem List   Diagnosis    Anxiety    Congenital cerebral ventriculomegaly ([facility] [address] )    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)    Moderate episode of recurrent major depressive disorder ([facility] [address] )    Periventricular heterotopia (HCC)    Headache, unspecified headache type    Visual distortions    Nausea    Chronic right-sided low back pain    Self-injurious behavior    Claustrophobia     Past Medical History:   Diagnosis Date    Cerebral ventriculomegaly 8/25/2016    Seizures (HCC)     Syncopal episodes      History reviewed  No pertinent surgical history  Past Psychiatric History:  Depression: Yes  Anxiety: Yes  Psychosis: No    Medications:    Current Outpatient Medications:     OXcarbazepine (TRILEPTAL) 600 mg tablet, Take 1 tablet (600 mg total) by mouth every 12 (twelve) hours, Disp: 180 tablet, Rfl: 3    venlafaxine (EFFEXOR-XR) 75 mg 24 hr capsule, Take 1 capsule (75 mg total) by mouth daily, Disp: 90 capsule, Rfl: 3    ondansetron (ZOFRAN) 4 mg tablet, Take 1 tablet (4 mg total) by mouth every 8 (eight) hours as needed for nausea or vomiting, Disp: 20 tablet, Rfl: 0    Allergies:  No Known Allergies    Family history:  Family History   Problem Relation Age of Onset    No Known Problems Mother     No Known Problems Father     No Known Problems Brother      Paternal Amador Lino started to have seizures later in life  No developmental issues  Parents are healthy    Social History  Living situation:  lives with parents  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, works in retail; 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[address] [address] [employer]); sometimes she needs to use a ladder  She has been feeling stressed out, works on a computer for a couple of hours  Driving:  active license  reports that she has never smoked  She has never used smokeless tobacco  She reports current alcohol use  She reports that she does not use drugs       Review of Systems  A review of at least 12 organ/systems was obtained by the medical assistant and reviewed by me, including additional positives/negatives:  Constitutional: positive for weight loss    Decision making was of high-complexity due to the patient's high risk condition (seizures), psychiatric and neuropsychological comorbidities, behavioral problems, memory and cognitive problems and medication side effects  The total amount of time spent with the patient was 27 minutes  More than 50% of this time was devoted to counseling and coordination of care  Issues addressed during this clinic visit included overall management, medication counseling or monitoring (including adverse effects, side effects and risks of antiepileptic medications)     Start time: 10:25AM  End time: 10:52AM

## 2020-09-21 NOTE — PROGRESS NOTES
Review of Systems    {LimROS-complete:62781}      Review of Systems   Constitutional: Negative  Negative for appetite change and fever  HENT: Negative  Negative for hearing loss, tinnitus, trouble swallowing and voice change  Eyes: Negative  Negative for photophobia and pain  Respiratory: Negative  Negative for shortness of breath  Cardiovascular: Negative  Negative for palpitations  Gastrointestinal: Negative  Negative for nausea and vomiting  Endocrine: Negative  Negative for cold intolerance  Genitourinary: Negative  Negative for dysuria, frequency and urgency  Musculoskeletal: Negative  Negative for myalgias and neck pain  Skin: Negative  Negative for rash  Neurological: Negative  Negative for dizziness, tremors, seizures, syncope, facial asymmetry, speech difficulty, weakness, light-headedness, numbness and headaches  Hematological: Negative  Does not bruise/bleed easily  Psychiatric/Behavioral: Negative  Negative for confusion, hallucinations and sleep disturbance

## 2020-12-18 ENCOUNTER — TELEPHONE (OUTPATIENT)
Dept: NEUROLOGY | Facility: CLINIC | Age: 25
End: 2020-12-18

## 2020-12-21 ENCOUNTER — TELEPHONE (OUTPATIENT)
Dept: NEUROLOGY | Facility: CLINIC | Age: 25
End: 2020-12-21

## 2020-12-22 ENCOUNTER — OFFICE VISIT (OUTPATIENT)
Dept: NEUROLOGY | Facility: CLINIC | Age: 25
End: 2020-12-22
Payer: COMMERCIAL

## 2020-12-22 VITALS
BODY MASS INDEX: 19.35 KG/M2 | SYSTOLIC BLOOD PRESSURE: 138 MMHG | HEIGHT: 63 IN | DIASTOLIC BLOOD PRESSURE: 90 MMHG | HEART RATE: 78 BPM | WEIGHT: 109.2 LBS

## 2020-12-22 DIAGNOSIS — F33.1 MODERATE EPISODE OF RECURRENT MAJOR DEPRESSIVE DISORDER (HCC): ICD-10-CM

## 2020-12-22 DIAGNOSIS — G40.209 LOCALIZATION-RELATED FOCAL EPILEPSY WITH COMPLEX PARTIAL SEIZURES (HCC): Primary | ICD-10-CM

## 2020-12-22 DIAGNOSIS — F41.9 ANXIETY: ICD-10-CM

## 2020-12-22 PROCEDURE — 99214 OFFICE O/P EST MOD 30 MIN: CPT | Performed by: NURSE PRACTITIONER

## 2020-12-22 RX ORDER — LACOSAMIDE 50 MG/1
TABLET ORAL
Qty: 120 TABLET | Refills: 2 | Status: SHIPPED | OUTPATIENT
Start: 2020-12-22 | End: 2021-03-22

## 2020-12-22 RX ORDER — VENLAFAXINE HYDROCHLORIDE 37.5 MG/1
37.5 CAPSULE, EXTENDED RELEASE ORAL DAILY
Qty: 90 CAPSULE | Refills: 2 | Status: SHIPPED | OUTPATIENT
Start: 2020-12-22 | End: 2021-08-29

## 2021-03-05 LAB
10OH-CARBAZEPINE SERPL-MCNC: 22.6 MCG/ML (ref 8–35)
BASOPHILS # BLD AUTO: 31 CELLS/UL (ref 0–200)
BASOPHILS NFR BLD AUTO: 0.5 %
BUN SERPL-MCNC: 15 MG/DL (ref 7–25)
BUN/CREAT SERPL: NORMAL (CALC) (ref 6–22)
CALCIUM SERPL-MCNC: 9.5 MG/DL (ref 8.6–10.2)
CHLORIDE SERPL-SCNC: 102 MMOL/L (ref 98–110)
CO2 SERPL-SCNC: 32 MMOL/L (ref 20–32)
CREAT SERPL-MCNC: 0.77 MG/DL (ref 0.5–1.1)
EOSINOPHIL # BLD AUTO: 93 CELLS/UL (ref 15–500)
EOSINOPHIL NFR BLD AUTO: 1.5 %
ERYTHROCYTE [DISTWIDTH] IN BLOOD BY AUTOMATED COUNT: 11.7 % (ref 11–15)
GLUCOSE SERPL-MCNC: 75 MG/DL (ref 65–99)
HCT VFR BLD AUTO: 39.4 % (ref 35–45)
HGB BLD-MCNC: 13 G/DL (ref 11.7–15.5)
LACOSAMIDE SERPL-MCNC: 4.4 MCG/ML
LYMPHOCYTES # BLD AUTO: 1438 CELLS/UL (ref 850–3900)
LYMPHOCYTES NFR BLD AUTO: 23.2 %
MCH RBC QN AUTO: 30.1 PG (ref 27–33)
MCHC RBC AUTO-ENTMCNC: 33 G/DL (ref 32–36)
MCV RBC AUTO: 91.2 FL (ref 80–100)
MONOCYTES # BLD AUTO: 502 CELLS/UL (ref 200–950)
MONOCYTES NFR BLD AUTO: 8.1 %
NEUTROPHILS # BLD AUTO: 4135 CELLS/UL (ref 1500–7800)
NEUTROPHILS NFR BLD AUTO: 66.7 %
PLATELET # BLD AUTO: 284 THOUSAND/UL (ref 140–400)
PMV BLD REES-ECKER: 9.9 FL (ref 7.5–12.5)
POTASSIUM SERPL-SCNC: 4.1 MMOL/L (ref 3.5–5.3)
RBC # BLD AUTO: 4.32 MILLION/UL (ref 3.8–5.1)
SL AMB EGFR AFRICAN AMERICAN: 124 ML/MIN/1.73M2
SL AMB EGFR NON AFRICAN AMERICAN: 107 ML/MIN/1.73M2
SODIUM SERPL-SCNC: 140 MMOL/L (ref 135–146)
WBC # BLD AUTO: 6.2 THOUSAND/UL (ref 3.8–10.8)

## 2021-03-17 ENCOUNTER — TELEPHONE (OUTPATIENT)
Dept: NEUROLOGY | Facility: CLINIC | Age: 26
End: 2021-03-17

## 2021-03-17 NOTE — TELEPHONE ENCOUNTER
I tried calling patient to confirm her appointment with Dr Noemi Hitchcock [facility]  Patients mailbox is full and I was unable to leave a message

## 2021-03-22 ENCOUNTER — OFFICE VISIT (OUTPATIENT)
Dept: NEUROLOGY | Facility: CLINIC | Age: 26
End: 2021-03-22
Payer: COMMERCIAL

## 2021-03-22 VITALS
BODY MASS INDEX: 20.87 KG/M2 | HEART RATE: 84 BPM | HEIGHT: 62 IN | DIASTOLIC BLOOD PRESSURE: 77 MMHG | SYSTOLIC BLOOD PRESSURE: 137 MMHG | WEIGHT: 113.4 LBS | RESPIRATION RATE: 18 BRPM

## 2021-03-22 DIAGNOSIS — Q04.8 CONGENITAL CEREBRAL VENTRICULOMEGALY (HCC): ICD-10-CM

## 2021-03-22 DIAGNOSIS — F33.1 MODERATE EPISODE OF RECURRENT MAJOR DEPRESSIVE DISORDER (HCC): ICD-10-CM

## 2021-03-22 DIAGNOSIS — G40.209 LOCALIZATION-RELATED FOCAL EPILEPSY WITH COMPLEX PARTIAL SEIZURES (HCC): Primary | ICD-10-CM

## 2021-03-22 DIAGNOSIS — F41.9 ANXIETY: ICD-10-CM

## 2021-03-22 PROBLEM — Z72.89 SELF-INJURIOUS BEHAVIOR: Status: RESOLVED | Noted: 2019-09-09 | Resolved: 2021-03-22

## 2021-03-22 PROCEDURE — 1036F TOBACCO NON-USER: CPT | Performed by: PSYCHIATRY & NEUROLOGY

## 2021-03-22 PROCEDURE — 3008F BODY MASS INDEX DOCD: CPT | Performed by: PSYCHIATRY & NEUROLOGY

## 2021-03-22 PROCEDURE — 99214 OFFICE O/P EST MOD 30 MIN: CPT | Performed by: PSYCHIATRY & NEUROLOGY

## 2021-03-22 RX ORDER — LACOSAMIDE 100 MG/1
100 TABLET ORAL EVERY 12 HOURS SCHEDULED
Qty: 180 TABLET | Refills: 1 | Status: SHIPPED | OUTPATIENT
Start: 2021-03-22 | End: 2021-09-22

## 2021-03-22 NOTE — PATIENT INSTRUCTIONS
Plan:   1 - continue with oxcarbazepine 600mg tab take one tab twice a day  2 - continue with Lacosamide (Vimpat) 100mg twice a day (changed from 50mg tabs to 100mg tabs)  3 - continue with Venlafaxine ER 75mg and 37 5mg one tab each daily  4 - follow-up with AP 6 months  5 - she may continue with ondansetron for PRN use to manage occasional nausea  6 - call the office if you experience recurrence of visual seizure, if you are having issues with medication side effects, worsening depression and anxiety symptoms

## 2021-03-22 NOTE — PROGRESS NOTES
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) and co-morbid depression and generalized anxiety disorders  She has been doing significantly better since the addition of lacosamide to oxcarbazepine  She needs more than one medication to prevent seizures at this point  Prior trials of medications caused significant side effects  But this seems to be a good combination for her with no seizures and no side effects as the goal     Her depression and anxiety symptoms seem to be much better with the increase on venlafaxine dose  There have been no recurrent headaches (which may have been due to lamotrigine)  Plan:   1 - continue with oxcarbazepine 600mg tab take one tab twice a day  2 - continue with Lacosamide (Vimpat) 100mg twice a day (changed from 50mg tabs to 100mg tabs)  3 - continue with Venlafaxine ER 75mg and 37 5mg one tab each daily  4 - follow-up with AP 6 months  5 - she may continue with ondansetron for PRN use to manage occasional nausea  6 - call the office if you experience recurrence of visual seizure, if you are having issues with medication side effects, worsening depression and anxiety symptoms  I reviewed her medications which have sufficient refills until the next follow-up visit; she may call the office for refills should she be close to running out of medications      Problem List Items Addressed This Visit        Nervous and Auditory    Congenital cerebral ventriculomegaly (HCC)    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) - Primary    Relevant Medications    lacosamide (VIMPAT) 100 mg tablet       Other    Anxiety    Moderate episode of recurrent major depressive disorder (HCC) Chief Complaint:    Chief Complaint     Seizures        HPI:    Anjum Palomino is a 22 y o  right handed female here for follow-up evaluation of focal seizures, visual distortions with headache and difficulty dealing with her emotions  Interval history 3/22/2021  She had a follow-up with Valerie Contreras on 12/22/2020, she reported discontinuation of zonisamide due to persistent weight loss  There was a focal visual seizure  Lacosamide was started  She continues to have stress and stopped having visits with her therapist due to [address]  She is not bothered by the addition of lacosamide  There has been no focal visual aura, loss of consciousness, or altered awareness  She reports that she has gained 4 pounds  She has a good appetite  She has not noticed recurrence of headaches  She feels that her mood is much better  She has been dealing with stress by trying to minimize it and keeping herself busy outside of work  She is keeping busy with crafting  She wondering if she can get a vaccine  AED/side effects/compliance:  Oxcarbazepine 600-600  Lacosamide 100-100  No missed doses    Event/Seizure semiology:  1  Lightheaded blurry vision, loss of consciousness, convulsion  (Last one 8/24/2016)  2  Blurry vision, circles in a part of her visual field (left side), followed by a period of headache, no clear confusion or disorientation (described in January 2017 visit) - recurs 2-3 times a month    Women of childbearing age:  Contraception: not sexual activity  Folic acid: no - does not want at this point    Prior Epilepsy History:  Initial intake epilepsy history 9/12/2016  Review of hospital records  Ms Lissa Upton was admitted from 8/24-8/26/2016 to [facility] after a witnessed seizure  She had an MRI brain study that showed cortical malformations including heterotopias, she was subsequently started on levetiracetam 500mg twice a day    Dr Aydee Marcial did the initial consultation; she reported that her first seizure was in June 2016, onset of lightheadedness, blurry vision, no memory of passing out but she collapsed, the next thing she remembered was waking up in the ambulance  On the day of admission she felt lightheaded, blurry vision, veering to the sides of the aisle and collapsed, her mother witnessed some of the event but only after she fell; patients eyes were open and mouth was moving, arms were tonically flexed and asymmetric and legs were hypertonic with repeated relaxation and tonic stiffening  Again she did not regain consciousness until she was in the ambulance  She had a history of one febrile seizure, a paternal great grandmother with seizures, head CT scan found ventriculomegaly  Patients history  June 2016, Ms Cunningham recalled that she was at work and the next thing she remembered was people standing over her, she did not remember what happened before other than feeling lightheaded and blurry vision  She denied feeling exhausted, new medications, drugs, illness/fevers, and there were no changes in her usual habits  August 2016, again she does not recall much other than being told she had a seizure for less minute  Her mother reported that she did see the end of the seizure; initially Ms Antonino Malave had her the arms and legs extended outward, then arms were flexed inward, convulsion lasted about 30 minute, and she was unresponsive for about 5 minutes but she was lethargic for about 15-20 minutes afterwards  Her mother had briefly turned away and did not witness the very start of the seizure  Again, there were no alcohol, drugs, medications, or illness experienced by Ms Cunningham  She denies any history of myoclonus, staring spells, automatisms, unexplained hyperkinetic behaviors, olfactory / gustatory hallucinations, epigastric rising events, boogie vu events, visual hallucinations, unexplained nocturnal enuresis, or nocturnal tongue biting    At 17 months, she had a cold with a super high temperature, she had a seizure, deemed to have been a simple febrile seizure  LEV was associated with daily headaches that come and go throughout the day may last for a couple of hours at a time  These are very severe because she does not do well with pain  Headache is best describe as a throbbing sensation for a couple of hours, daily, she does not take any medication for pain because she is not sure if she can  There is no visual changes, no loss of vision, or changes in refraction  Summary 2017  Switched LVT to [facility], better tolerated, over the course of 2017, OXC was escalated for recurrent visual distortions of the left side, circles, blurry vision (sensation of visual obscuration lasting for 2 minutes, left side swirls, if severe enough that she would temporarily not see clearly on the left visual field), lasting minutes without altered awareness or loss of consciousness  There is a headache that follows for about half an hour  These visual distortions have been sporadic once 1-5 months  We had tried gabapentin in the Spring/summer 2017 but she stopped taking it because of side effects / headaches (horrible, feeling like everything was in slow motion)  Sometimes these visual distortions are triggered by stress at work  She works part time as a  4-5 hours  I wrote a letter on her behalf to get a 10-15 minutes break every 2 hours  She reports issues with anxiety, mostly as it involves the possibility of a recurrent seizure  She feels like she has been looking over her shoulder because of her epilepsy  She tried [employer] but found it useless  She did get her 's license back with [employer] report of occasional visual seizures  Summary 2018  Started with -1200  She continues to have focal visual seizures, visual disturbances of circles on the left visual field followed by a headache, no disorientation or confusion; episodic about 1-2 times a month    We had tried higher dose of OXC but she was having balance and cognitive issues  We tried adding Lyrica, but no improvement, weaned off and started LMG  She is driving but she has avoided the highways due to her seizures  She has enough of a warning to stop the car or pull over on local streets  Struggles with depression and anxiety  Summary 2019  -600, -200  There was no improvement with the addition of lamotrigine to the prevention of visual auras, which continue to be infrequent events (presumed focal visual seizures)  Combination of OXC and LMG caused double vision, along with headaches, nausea  Due to headaches we started her on zonisamide and took her off of lamotrigine  Once off lamotrigine, headaches resolved  She struggles with her emotions including cutting behaviors, anxiety, and depression; however, she refuses to accept referral to seeing a behavioral health specialist   She has stress from 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, she likes whom she works with but not who she works for) and family members (who don't get her)  Summary 2020  -600,   Weaned off of lamotrigine due to recurrent headaches  Trial of zonisamide caused weight loss despite good appetite  She did not have recurrent visual seizure until zonisamide was stopped  She was getting therapy at [facility]); not sure if it helped with depression and anxiety (she reported it was getting worse midway through the year)  We increased Effexor include 37 5mg daily  By the end of the year, she was started on Vimpat      Special Features  Status epilepticus: No  Self Injury Seizures: No  Precipitating Factors: None    Epilepsy Risk Factors:  Abnormal pregnancy: No  Abnormal birth/: No  Abnormal Development: walked after a year, slower with language  Febrile seizures, simple: Yes  Febrile seizures, complex: No  CNS infection: No  Mental retardation: No  Cerebral palsy: No  Head injury (moderate/severe): No  CNS neoplasm: No  CNS malformation: Yes - MRI brain found ventriculomegaly, abnormal splenium of corpus callosum, and subependymal heterotopia  Neurosurgical procedure: No  Stroke: No  Alcohol abuse: No  Drug abuse: No  Family history Sz/epilepsy: No    Prior AEDs:  Levetiracetam (headaches), oxcarbazepine (continued to have visual aura), gabapentin (side effect), pregabalin (continues to have visual auras and very emotional), lamotrigine (caused more headaches), zonisamide (complaints of weight loss), lacosamide    Prior workup:      Imagin2016   MRI brain  Bilateral subependymal heterotopias in the region of the atria of the lateral ventricles with bilateral colpocephaly  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  Solitary nonspecific FLAIR hyperintensity subcortical white matter right frontal lobe    2017  MRI brain  Stable colpocephalic appearance to the ventricles, right greater than left, bowing / stretching of the posterior corpus callosum  Subtle foci nodular heterotopia along atria of both lateral ventricles more apparent on the right  Tiny focus of increased signal in the white matter of the right frontal horn is less apparent  Hippocampi are somewhat dysmorphic bilaterally    2019  MRI alonso w/wo  Single white matter hyperintensity in the right anterior superior frontal lobe (unchanged)  Stable colpocephaly of the lateral ventricles, right greater than left  Symmetric hippocampal formations  No heterotopia was identified    EEGs:  2016  Intermittent focal slowing over the right posterior temporal region but limited to the T6 electrode, consider repeating the study    2016  Photic driving is lateralized to the left; thus, possibly relative neuronal dysfunction in the right posterior region      2018  Frequent, right posterior temporal delta slowing and occipital sharp activity during 10 Hz photic stimulation  Labs:  Component      Latest Ref Rng & Units 3/2/2021   White Blood Cell Count      3 8 - 10 8 Thousand/uL 6 2   Red Blood Cell Count      3 80 - 5 10 Million/uL 4 32   Hemoglobin      11 7 - 15 5 g/dL 13 0   HCT      35 0 - 45 0 % 39 4   Platelet Count      213 - 400 Thousand/uL 284   Glucose, Random      65 - 99 mg/dL 75   BUN      7 - 25 mg/dL 15   Creatinine      0 50 - 1 10 mg/dL 0 77   eGFR Non       > OR = 60 mL/min/1 73m2 107   eGFR       > OR = 60 mL/min/1 73m2 124   SL AMB BUN/CREATININE RATIO      6 - 22 (calc) NOT APPLICABLE   Sodium      135 - 146 mmol/L 140   Potassium      3 5 - 5 3 mmol/L 4 1   Chloride      98 - 110 mmol/L 102   CO2      20 - 32 mmol/L 32   Calcium      8 6 - 10 2 mg/dL 9 5   10-HYDROXYCARBAZEPINE      8 0 - 35 0 mcg/mL 22 6   Lacosamide      mcg/mL 4 4     General exam   Blood pressure 137/77, pulse 84, resp  rate 18, height 5' 2" (1 575 m), weight 51 4 kg (113 lb 6 4 oz)    Appearance: normal development; seems to be in a better mood today  Carotids: not assessed  Cardiovascular: normal cardiac rhythm and sounds  Pulmonary: clear to auscultation    HEENT: anicteric   Fundoscopy: not assessed    Mental status  Orientation: alert and oriented to name, place, time  Fund of Knowledge: intact   Attention and Concentration: intact  Current and Remote Memory:intact  Language: no aphasia, spontaneous speech is normal and comprehension is intact    Cranial Nerves  CN 1: not tested  CN 2: Visual fields intact to confrontation   CN 3, 4, 6: EOMI, no nystagmus  CN 5:not assessed  CN 7:not assessed  CN 8:not assessed  CN 9, 10:no dysarthria present  CN 11:not assessed  CN 12:not assessed    Motor:  Bulk, Tone: normal bulk, normal tone  Pronation: not assessed  Strength: Symmetric strength of the arms and legs, no lateralizing weakness (mild difficulty with right hip causing difficulty with hip flexion and knee extension    Sensory:  Lighttouch: not assessed  Romberg:not assessed    Coordination:  FNF:FNF bilaterally intact  ERICA:intact  FFM:intact   Gait/Station:normal gait and normal tandem gait    Reflexes:  Not assessed    Past Medical/Surgical History:  Patient Active Problem List   Diagnosis    Anxiety    Congenital cerebral ventriculomegaly ([facility] [address] )    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)    Moderate episode of recurrent major depressive disorder (HCC)    Periventricular heterotopia (HCC)    Headache, unspecified headache type    Visual distortions    Nausea    Chronic right-sided low back pain    Claustrophobia     History reviewed  No pertinent surgical history      Past Psychiatric History:  Depression: Yes  Anxiety: Yes  Psychosis: No    Medications:    Current Outpatient Medications:     lacosamide (VIMPAT) 100 mg tablet, Take 1 tablet (100 mg total) by mouth every 12 (twelve) hours, Disp: 180 tablet, Rfl: 1    OXcarbazepine (TRILEPTAL) 600 mg tablet, Take 1 tablet (600 mg total) by mouth every 12 (twelve) hours, Disp: 180 tablet, Rfl: 3    venlafaxine (EFFEXOR-XR) 37 5 mg 24 hr capsule, Take 1 capsule (37 5 mg total) by mouth daily In addition to 75 mg tablet , Disp: 90 capsule, Rfl: 2    venlafaxine (EFFEXOR-XR) 75 mg 24 hr capsule, Take 1 capsule (75 mg total) by mouth daily, Disp: 90 capsule, Rfl: 3    ondansetron (ZOFRAN) 4 mg tablet, Take 1 tablet (4 mg total) by mouth every 8 (eight) hours as needed for nausea or vomiting, Disp: 20 tablet, Rfl: 0    Allergies:  No Known Allergies    Family history:  Family History   Problem Relation Age of Onset    No Known Problems Mother     No Known Problems Father     No Known Problems Brother      Paternal [address] Grandmother started to have seizures later in life  No developmental issues  Parents are healthy    Social History  Living situation:  lives with parents  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, works in retail; 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[employer]); sometimes she needs to use a ladder  She has been feeling stressed out, works on a computer for a couple of hours  Driving:  active license  reports that she has never smoked  She has never used smokeless tobacco  She reports current alcohol use  She reports that she does not use drugs  Review of Systems  A review of at least 12 organ/systems was obtained by the medical assistant and reviewed by me, including additional positives/negatives:  Neurological: Positive for speech difficulty  Negative for dizziness, tremors, seizures, syncope, facial asymmetry, weakness, light-headedness, numbness and headaches  Hematological: Negative  Does not bruise/bleed easily  Psychiatric/Behavioral: Negative for confusion, hallucinations and sleep disturbance  The patient is nervous/anxious  Depression, anxiety and mood swings    Gastrointestinal: Positive for constipation  Decision making was of high-complexity due to the patient's high risk condition (seizures), psychiatric and neuropsychological comorbidities, behavioral problems, memory and cognitive problems and medication side effects

## 2021-03-22 NOTE — PROGRESS NOTES
Patient ID: Funmilayo Greer is a 22 y o  female  Assessment/Plan:    No problem-specific Assessment & Plan notes found for this encounter  {Assess/PlanSmartLinks:02586}       Subjective:    HPI    {[facility] HPI texts:22260}    {Common ambulatory SmartLinks:09021}         Objective:    Blood pressure 137/77, pulse 84, resp  rate 18, height 5' 2" (1 575 m), weight 51 4 kg (113 lb 6 4 oz)  Physical Exam    Neurological Exam      ROS:    Review of Systems   Constitutional: Negative  Negative for appetite change and fever  HENT: Negative  Negative for hearing loss, tinnitus, trouble swallowing and voice change  Eyes: Negative  Negative for photophobia and pain  Respiratory: Negative  Negative for shortness of breath  Cardiovascular: Negative  Negative for palpitations  Gastrointestinal: Positive for constipation  Negative for nausea and vomiting  Endocrine: Negative  Negative for cold intolerance  Genitourinary: Negative  Negative for dysuria, frequency and urgency  Musculoskeletal: Negative  Negative for myalgias and neck pain  Skin: Negative  Negative for rash  Allergic/Immunologic: Negative  Neurological: Positive for speech difficulty  Negative for dizziness, tremors, seizures, syncope, facial asymmetry, weakness, light-headedness, numbness and headaches  Hematological: Negative  Does not bruise/bleed easily  Psychiatric/Behavioral: Negative for confusion, hallucinations and sleep disturbance  The patient is nervous/anxious           Depression, anxiety and mood swings

## 2021-03-30 DIAGNOSIS — Z23 ENCOUNTER FOR IMMUNIZATION: ICD-10-CM

## 2021-04-01 ENCOUNTER — IMMUNIZATIONS (OUTPATIENT)
Dept: FAMILY MEDICINE CLINIC | Facility: HOSPITAL | Age: 26
End: 2021-04-01

## 2021-04-01 DIAGNOSIS — Z23 ENCOUNTER FOR IMMUNIZATION: Primary | ICD-10-CM

## 2021-04-01 PROCEDURE — 91300 SARS-COV-2 / COVID-19 MRNA VACCINE (PFIZER-BIONTECH) 30 MCG: CPT

## 2021-04-01 PROCEDURE — 0001A SARS-COV-2 / COVID-19 MRNA VACCINE (PFIZER-BIONTECH) 30 MCG: CPT

## 2021-04-11 DIAGNOSIS — G40.209 LOCALIZATION-RELATED FOCAL EPILEPSY WITH COMPLEX PARTIAL SEIZURES (HCC): ICD-10-CM

## 2021-04-12 RX ORDER — LACOSAMIDE 50 MG/1
TABLET ORAL
Qty: 120 TABLET | Refills: 2 | OUTPATIENT
Start: 2021-04-12

## 2021-04-22 ENCOUNTER — IMMUNIZATIONS (OUTPATIENT)
Dept: FAMILY MEDICINE CLINIC | Facility: HOSPITAL | Age: 26
End: 2021-04-22

## 2021-04-22 DIAGNOSIS — Z23 ENCOUNTER FOR IMMUNIZATION: Primary | ICD-10-CM

## 2021-04-22 PROCEDURE — 91300 SARS-COV-2 / COVID-19 MRNA VACCINE (PFIZER-BIONTECH) 30 MCG: CPT

## 2021-04-22 PROCEDURE — 0002A SARS-COV-2 / COVID-19 MRNA VACCINE (PFIZER-BIONTECH) 30 MCG: CPT

## 2021-04-24 DIAGNOSIS — G40.209 LOCALIZATION-RELATED FOCAL EPILEPSY WITH COMPLEX PARTIAL SEIZURES (HCC): ICD-10-CM

## 2021-04-26 RX ORDER — LACOSAMIDE 50 MG/1
TABLET ORAL
Qty: 120 TABLET | Refills: 2 | OUTPATIENT
Start: 2021-04-26

## 2021-05-27 DIAGNOSIS — G40.209 LOCALIZATION-RELATED FOCAL EPILEPSY WITH COMPLEX PARTIAL SEIZURES (HCC): ICD-10-CM

## 2021-05-27 RX ORDER — LACOSAMIDE 50 MG/1
TABLET ORAL
Qty: 120 TABLET | Refills: 2 | Status: SHIPPED | OUTPATIENT
Start: 2021-05-27 | End: 2021-09-22

## 2021-07-08 ENCOUNTER — TELEPHONE (OUTPATIENT)
Dept: NEUROLOGY | Facility: CLINIC | Age: 26
End: 2021-07-08

## 2021-07-08 NOTE — TELEPHONE ENCOUNTER
Patient calling to say that she is in between insurances right now and has a temporary insurance through [facility] but her vimpat is not covered under this plan  Patient was told that if it is not covered there is a program that would assist her with getting the medication  Said that she does have the new insurance but does not have the ID card  Advised that she call the new insurance to see if they are able to give her the insurance information so they can process this information at the pharmacy to see if the medication is covered  Advised that [employer] will take a little while to go through  Patient states understanding  Asked patient to call the office back with update  She is agreeable

## 2021-08-04 ENCOUNTER — TELEPHONE (OUTPATIENT)
Dept: NEUROLOGY | Facility: CLINIC | Age: 26
End: 2021-08-04

## 2021-08-04 NOTE — TELEPHONE ENCOUNTER
Patient calling with new insurance  Said her Vimpat is requiring a PA  Submitted on [facility]      [facility]  ID: [id number]  BIN: [id number]  PCN: [id number]  GRP: [id number]

## 2021-08-29 DIAGNOSIS — F33.1 MODERATE EPISODE OF RECURRENT MAJOR DEPRESSIVE DISORDER (HCC): ICD-10-CM

## 2021-08-29 RX ORDER — VENLAFAXINE HYDROCHLORIDE 37.5 MG/1
CAPSULE, EXTENDED RELEASE ORAL
Qty: 30 CAPSULE | Refills: 8 | Status: SHIPPED | OUTPATIENT
Start: 2021-08-29 | End: 2021-09-01

## 2021-09-01 DIAGNOSIS — F33.1 MODERATE EPISODE OF RECURRENT MAJOR DEPRESSIVE DISORDER (HCC): ICD-10-CM

## 2021-09-01 RX ORDER — VENLAFAXINE HYDROCHLORIDE 37.5 MG/1
CAPSULE, EXTENDED RELEASE ORAL
Qty: 30 CAPSULE | Refills: 8 | Status: SHIPPED | OUTPATIENT
Start: 2021-09-01 | End: 2022-05-16

## 2021-09-09 ENCOUNTER — TELEPHONE (OUTPATIENT)
Dept: NEUROLOGY | Facility: CLINIC | Age: 26
End: 2021-09-09

## 2021-09-21 DIAGNOSIS — F33.1 MODERATE EPISODE OF RECURRENT MAJOR DEPRESSIVE DISORDER (HCC): ICD-10-CM

## 2021-09-22 ENCOUNTER — OFFICE VISIT (OUTPATIENT)
Dept: NEUROLOGY | Facility: CLINIC | Age: 26
End: 2021-09-22
Payer: COMMERCIAL

## 2021-09-22 VITALS
HEART RATE: 84 BPM | DIASTOLIC BLOOD PRESSURE: 80 MMHG | RESPIRATION RATE: 18 BRPM | WEIGHT: 118.4 LBS | TEMPERATURE: 97.5 F | SYSTOLIC BLOOD PRESSURE: 122 MMHG | HEIGHT: 63 IN | BODY MASS INDEX: 20.98 KG/M2

## 2021-09-22 DIAGNOSIS — F41.9 ANXIETY: ICD-10-CM

## 2021-09-22 DIAGNOSIS — G40.209 LOCALIZATION-RELATED FOCAL EPILEPSY WITH COMPLEX PARTIAL SEIZURES (HCC): Primary | ICD-10-CM

## 2021-09-22 PROCEDURE — 99214 OFFICE O/P EST MOD 30 MIN: CPT | Performed by: PHYSICIAN ASSISTANT

## 2021-09-22 RX ORDER — VENLAFAXINE HYDROCHLORIDE 75 MG/1
CAPSULE, EXTENDED RELEASE ORAL
Qty: 30 CAPSULE | Refills: 11 | Status: SHIPPED | OUTPATIENT
Start: 2021-09-22 | End: 2022-08-08 | Stop reason: SDUPTHER

## 2021-09-22 NOTE — PATIENT INSTRUCTIONS
Plan:   1 - continue with oxcarbazepine 600mg tab take one tab twice a day  2 - continue with Lacosamide (Vimpat) 100mg twice a day  3 - continue with Venlafaxine ER 75mg and 37 5mg one tab each daily  4 - repeat labs just before your next visit  5 - follow-up in 6 months

## 2021-09-22 NOTE — PROGRESS NOTES
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) and co-morbid depression and generalized anxiety disorders  Her current medication regiment of oxcarbazepine and lacosamide seems to be a good combination for her with no seizures and no side effects as the goal      Her depression and anxiety symptoms seem to be much better with the increase on venlafaxine dose  She is very bright and interactive today  Plan:   1 - continue with oxcarbazepine 600mg tab take one tab twice a day  2 - continue with Lacosamide (Vimpat) 100mg twice a day   3 - continue with Venlafaxine ER 75mg and 37 5mg one tab each daily  4 - labs before next visit (CBC, CMP, lacosamine and oxcarbazepine levels)    5 - follow up in 6 months           Diagnoses and all orders for this visit:    Localization-related focal epilepsy with complex partial seizures ([facility] [address] )  -     Oxcarbazepine level; Future  -     Lacosamide; Future  -     CBC and differential; Future  -     Comprehensive metabolic panel; Future    Anxiety           Subjective:    HPI    HPI:    Ivis Szymanski is a 32 y o  right handed female who presents today for neurologic follow up  Interval history 9/22/2021:  She is currently doing well  She seems bright and happy today, says her mood has been good in general   She is working and staying busy  She has not had any breakthrough seizures and tolerating her medications well  She denies significant headaches  Appetite has been good, gained 5lbs since last visit  She did get her COVID vaccine in the Spring and tolerated well  AED/side effects/compliance:  Oxcarbazepine 600-600  Lacosamide 100-100  No missed doses     Event/Seizure semiology:  1  Lightheaded blurry vision, loss of consciousness, convulsion  (Last one 8/24/2016)  2    Blurry vision, circles in a part of her visual field (left side), followed by a period of headache, no clear confusion or disorientation (described in January 2017 visit) - recurs 2-3 times a month     Women of childbearing age:  Contraception: not sexual activity  Folic acid: no - does not want at this point     Prior Epilepsy History:  Initial intake epilepsy history 9/12/2016  Review of hospital records  Ms Karthik Villegas was admitted from 8/24-8/26/2016 to [facility] after a witnessed seizure  She had an MRI brain study that showed cortical malformations including heterotopias, she was subsequently started on levetiracetam 500mg twice a day  Dr Flavio Jiang did the initial consultation; she reported that her first seizure was in June 2016, onset of lightheadedness, blurry vision, no memory of passing out but she collapsed, the next thing she remembered was waking up in the ambulance  On the day of admission she felt lightheaded, blurry vision, veering to the sides of the aisle and collapsed, her mother witnessed some of the event but only after she fell; patients eyes were open and mouth was moving, arms were tonically flexed and asymmetric and legs were hypertonic with repeated relaxation and tonic stiffening  Again she did not regain consciousness until she was in the ambulance  She had a history of one febrile seizure, a paternal great grandmother with seizures, head CT scan found ventriculomegaly  Patients history  June 2016, Ms Cunningham recalled that she was at work and the next thing she remembered was people standing over her, she did not remember what happened before other than feeling lightheaded and blurry vision  She denied feeling exhausted, new medications, drugs, illness/fevers, and there were no changes in her usual habits  August 2016, again she does not recall much other than being told she had a seizure for less minute  Her mother reported that she did see the end of the seizure; initially Ms Karthik Villegas had her the arms and legs extended outward, then arms were flexed inward, convulsion lasted about 30 minute, and she was unresponsive for about 5 minutes but she was lethargic for about 15-20 minutes afterwards  Her mother had briefly turned away and did not witness the very start of the seizure  Again, there were no alcohol, drugs, medications, or illness experienced by Ms Cunningham  She denies any history of myoclonus, staring spells, automatisms, unexplained hyperkinetic behaviors, olfactory / gustatory hallucinations, epigastric rising events, boogie vu events, visual hallucinations, unexplained nocturnal enuresis, or nocturnal tongue biting  At 17 months, she had a cold with a super high temperature, she had a seizure, deemed to have been a simple febrile seizure  LEV was associated with daily headaches that come and go throughout the day may last for a couple of hours at a time  These are very severe because she does not do well with pain  Headache is best describe as a throbbing sensation for a couple of hours, daily, she does not take any medication for pain because she is not sure if she can  There is no visual changes, no loss of vision, or changes in refraction  Summary 2017  Switched LVT to [facility], better tolerated, over the course of 2017, OXC was escalated for recurrent visual distortions of the left side, circles, blurry vision (sensation of visual obscuration lasting for 2 minutes, left side swirls, if severe enough that she would temporarily not see clearly on the left visual field), lasting minutes without altered awareness or loss of consciousness  There is a headache that follows for about half an hour  These visual distortions have been sporadic once 1-5 months  We had tried gabapentin in the Spring/summer 2017 but she stopped taking it because of side effects / headaches (horrible, feeling like everything was in slow motion)    Sometimes these visual distortions are triggered by stress at work   She works part time as a  4-5 hours  I wrote a letter on her behalf to get a 10-15 minutes break every 2 hours  She reports issues with anxiety, mostly as it involves the possibility of a recurrent seizure  She feels like she has been looking over her shoulder because of her epilepsy  She tried [employer] but found it useless  She did get her 's license back with [employer] report of occasional visual seizures  Summary 2018  Started with -1200  She continues to have focal visual seizures, visual disturbances of circles on the left visual field followed by a headache, no disorientation or confusion; episodic about 1-2 times a month  We had tried higher dose of OXC but she was having balance and cognitive issues  We tried adding Lyrica, but no improvement, weaned off and started LMG  She is driving but she has avoided the highways due to her seizures  She has enough of a warning to stop the car or pull over on local streets  Struggles with depression and anxiety  Summary 2019  -600, -200  There was no improvement with the addition of lamotrigine to the prevention of visual auras, which continue to be infrequent events (presumed focal visual seizures)  Combination of OXC and LMG caused double vision, along with headaches, nausea  Due to headaches we started her on zonisamide and took her off of lamotrigine  Once off lamotrigine, headaches resolved  She struggles with her emotions including cutting behaviors, anxiety, and depression; however, she refuses to accept referral to seeing a behavioral health specialist   She has stress from 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, she likes whom she works with but not who she works for) and family members (who don't get her)  Summary 2020  -600,   Weaned off of lamotrigine due to recurrent headaches  Trial of zonisamide caused weight loss despite good appetite    She did not have recurrent visual seizure until zonisamide was stopped  She was getting therapy at [facility] Frieda Couch MA); not sure if it helped with depression and anxiety (she reported it was getting worse midway through the year)  We increased Effexor include 37 5mg daily  By the end of the year, she was started on Vimpat  Interval history 3/22/2021  She had a follow-up with [facility] on 2020, she reported discontinuation of zonisamide due to persistent weight loss  There was a focal visual seizure  Lacosamide was started  She continues to have stress and stopped having visits with her therapist due to [address]  She is not bothered by the addition of lacosamide  There has been no focal visual aura, loss of consciousness, or altered awareness  She reports that she has gained 4 pounds  She has a good appetite  She has not noticed recurrence of headaches  She feels that her mood is much better  She has been dealing with stress by trying to minimize it and keeping herself busy outside of work  She is keeping busy with crafting  She wondering if she can get a vaccine         Special Features  Status epilepticus: No  Self Injury Seizures: No  Precipitating Factors: None     Epilepsy Risk Factors:  Abnormal pregnancy: No  Abnormal birth/: No  Abnormal Development: walked after a year, slower with language  Febrile seizures, simple: Yes  Febrile seizures, complex: No  CNS infection: No  Mental retardation: No  Cerebral palsy: No  Head injury (moderate/severe): No  CNS neoplasm: No  CNS malformation: Yes - MRI brain found ventriculomegaly, abnormal splenium of corpus callosum, and subependymal heterotopia  Neurosurgical procedure: No  Stroke: No  Alcohol abuse: No  Drug abuse: No  Family history Sz/epilepsy: No     Prior AEDs:  Levetiracetam (headaches), oxcarbazepine (continued to have visual aura), gabapentin (side effect), pregabalin (continues to have visual auras and very emotional), lamotrigine (caused more headaches), zonisamide (complaints of weight loss), lacosamide     Prior workup:      [id number]   MRI brain  Bilateral subependymal heterotopias in the region of the atria of the lateral ventricles with bilateral colpocephaly  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  Solitary nonspecific FLAIR hyperintensity subcortical white matter right frontal lobe     2017  MRI brain  Stable colpocephalic appearance to the ventricles, right greater than left, bowing / stretching of the posterior corpus callosum  Subtle foci nodular heterotopia along atria of both lateral ventricles more apparent on the right  Tiny focus of increased signal in the white matter of the right frontal horn is less apparent  Hippocampi are somewhat dysmorphic bilaterally     2019  MRI alonso w/wo  Single white matter hyperintensity in the right anterior superior frontal lobe (unchanged)  Stable colpocephaly of the lateral ventricles, right greater than left  Symmetric hippocampal formations  No heterotopia was identified     EEGs:  2016  Intermittent focal slowing over the right posterior temporal region but limited to the T6 electrode, consider repeating the study     2016  Photic driving is lateralized to the left; thus, possibly relative neuronal dysfunction in the right posterior region  2018  Frequent, right posterior temporal delta slowing and occipital sharp activity during 10 Hz photic stimulation      Labs:  Ref Range & Units 3/2/21  8:37 AM    Lacosamide mcg/mL 4 4      Ref Range & Units 3/2/21  8:37 AM    10-Hydroxycarbazepine 8 0 - 35 0 mcg/mL 22 6      Ref Range & Units 3/2/21  8:37 AM    White Blood Cell Count 3 8 - 10 8 Thousand/uL 6 2    Red Blood Cell Count 3 80 - 5 10 Million/uL 4 32    Hemoglobin 11 7 - 15 5 g/dL 13 0    HCT 35 0 - 45 0 % 39 4    MCV 80 0 - 100 0 fL 91 2    MCH 27 0 - 33 0 pg 30 1    MCHC 32 0 - 36 0 g/dL 33 0    RDW 11 0 - 15 0 % 11 7    Platelet Count 955 - 400 Thousand/uL 284    SL AMB MPV 7 5 - 12 5 fL 9 9    Neutrophils (Absolute) 1,500 - 7,800 cells/uL 4,135    Lymphocytes (Absolute) 850 - 3,900 cells/uL 1,438    Monocytes (Absolute) 200 - 950 cells/uL 502    Eosinophils (Absolute) 15 - 500 cells/uL 93    Basophils ABS 0 - 200 cells/uL 31    Neutrophils % 66 7    Lymphocytes % 23 2    Monocytes % 8 1    Eosinophils % 1 5    Basophils PCT % 0 5      Ref Range & Units 3/2/21  8:37 AM    Glucose, Random 65 - 99 mg/dL 75    Comment:                Fasting reference interval        BUN 7 - 25 mg/dL 15    Creatinine 0 50 - 1 10 mg/dL 0 77    eGFR Non African American > OR = 60 mL/min/1 73m2 107    eGFR African American > OR = 60 mL/min/1 73m2 124    SL AMB BUN/CREATININE RATIO 6 - 22 (calc) NOT APPLICABLE    Sodium 445 - 146 mmol/L 140    Potassium 3 5 - 5 3 mmol/L 4 1    Chloride 98 - 110 mmol/L 102    CO2 20 - 32 mmol/L 32    Calcium 8 6 - 10 2 mg/dL 9 5          The following portions of the patient's history were reviewed and updated as appropriate: current medications, past family history, past medical history, past social history, past surgical history and problem list          Objective:    Blood pressure 122/80, pulse 84, temperature 97 5 °F (36 4 °C), temperature source Temporal, resp  rate 18, height 5' 3" (1 6 m), weight 53 7 kg (118 lb 6 4 oz)  Physical Exam  Constitutional:       Appearance: Normal appearance  HENT:      Head: Normocephalic and atraumatic  Eyes:      Extraocular Movements: EOM normal       Pupils: Pupils are equal, round, and reactive to light  Skin:     General: Skin is warm and dry  Neurological:      Mental Status: She is alert  Coordination: Coordination is intact  Deep Tendon Reflexes: Strength normal and reflexes are normal and symmetric     Psychiatric:         Mood and Affect: Mood normal          Speech: Speech normal          Behavior: Behavior normal          Neurological Exam  Mental Status  Alert  Oriented to person, place, time and situation  Speech is normal  Language is fluent with no aphasia  Attention and concentration are normal     Cranial Nerves  CN II: Visual fields full to confrontation  CN III, IV, VI: Extraocular movements intact bilaterally  Pupils equal round and reactive to light bilaterally  CN V: Facial sensation is normal   CN VII: Full and symmetric facial movement  CN VIII: Hearing is normal   CN IX, X: Palate elevates symmetrically  CN XI: Shoulder shrug strength is normal   CN XII: Tongue midline without atrophy or fasciculations  Motor   Normal muscle tone  Strength is 5/5 throughout all four extremities  Sensory  Light touch is normal in upper and lower extremities  Reflexes  Deep tendon reflexes are 2+ and symmetric in all four extremities with downgoing toes bilaterally  Coordination  Finger-to-nose, rapid alternating movements and heel-to-shin normal bilaterally without dysmetria  Gait  Casual gait is normal including stance, stride, and arm swing  Current Outpatient Medications   Medication Sig Dispense Refill    lacosamide (VIMPAT) 100 mg tablet Take 1 tablet (100 mg total) by mouth every 12 (twelve) hours 180 tablet 1    ondansetron (ZOFRAN) 4 mg tablet Take 1 tablet (4 mg total) by mouth every 8 (eight) hours as needed for nausea or vomiting 20 tablet 0    OXcarbazepine (TRILEPTAL) 600 mg tablet Take 1 tablet (600 mg total) by mouth every 12 (twelve) hours 180 tablet 3    venlafaxine (EFFEXOR-XR) 37 5 mg 24 hr capsule TAKE 1 CAPSULE BY MOUTH EVERY DAY 30 capsule 8    venlafaxine (EFFEXOR-XR) 75 mg 24 hr capsule Take 1 capsule (75 mg total) by mouth daily 90 capsule 3     No current facility-administered medications for this visit        No Known Allergies     Family History   Problem Relation Age of Onset    No Known Problems Mother     No Known Problems Father     No Known Problems Brother      Paternal Gabrielle Giovani Grandmother started to have seizures later in life  No developmental issues  Parents are healthy    Past Medical History:   Diagnosis Date    Cerebral ventriculomegaly 8/25/2016    Seizures ([address] )     Self-injurious behavior 9/9/2019    Cutting behavior    Syncopal episodes       History reviewed  No pertinent surgical history  Past Psychiatric History:  Depression: Yes  Anxiety: Yes  Psychosis: No    Social History  Living situation:  lives with parents  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, works in retail; 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[address] [employer] [address] [employer]); sometimes she needs to use a ladder  She has been feeling stressed out, works on a computer for a couple of hours  Driving:  active license  reports that she has never smoked  She has never used smokeless tobacco  She reports current alcohol use  She reports that she does not use drugs  ROS:    Review of Systems   Constitutional: Negative  Negative for appetite change and fever  HENT: Negative  Negative for hearing loss, tinnitus, trouble swallowing and voice change  Eyes: Negative  Negative for photophobia and pain  Respiratory: Negative  Negative for shortness of breath  Cardiovascular: Negative  Negative for palpitations  Gastrointestinal: Negative  Negative for nausea and vomiting  Endocrine: Negative  Negative for cold intolerance  Genitourinary: Negative  Negative for dysuria, frequency and urgency  Musculoskeletal: Negative  Negative for myalgias and neck pain  Skin: Negative  Negative for rash  Neurological: Negative  Negative for dizziness, tremors, seizures, syncope, facial asymmetry, speech difficulty, weakness, light-headedness, numbness and headaches  Hematological: Negative  Does not bruise/bleed easily  Psychiatric/Behavioral: Negative  Negative for confusion, hallucinations and sleep disturbance       I personally reviewed and updated the ROS as appropriate

## 2021-09-24 DIAGNOSIS — R11.0 NAUSEA: ICD-10-CM

## 2021-09-24 DIAGNOSIS — G40.209 LOCALIZATION-RELATED FOCAL EPILEPSY WITH COMPLEX PARTIAL SEIZURES (HCC): ICD-10-CM

## 2021-09-24 RX ORDER — OXCARBAZEPINE 600 MG/1
600 TABLET, FILM COATED ORAL EVERY 12 HOURS SCHEDULED
Qty: 60 TABLET | Refills: 11 | Status: SHIPPED | OUTPATIENT
Start: 2021-09-24 | End: 2022-08-08

## 2021-09-27 RX ORDER — ONDANSETRON 4 MG/1
4 TABLET, FILM COATED ORAL EVERY 8 HOURS PRN
Qty: 20 TABLET | Refills: 0 | Status: SHIPPED | OUTPATIENT
Start: 2021-09-27 | End: 2021-11-03

## 2021-11-03 DIAGNOSIS — R11.0 NAUSEA: ICD-10-CM

## 2021-11-03 RX ORDER — ONDANSETRON 4 MG/1
TABLET, FILM COATED ORAL
Qty: 20 TABLET | Refills: 0 | Status: SHIPPED | OUTPATIENT
Start: 2021-11-03 | End: 2022-04-25 | Stop reason: SDUPTHER

## 2021-12-28 ENCOUNTER — TELEPHONE (OUTPATIENT)
Dept: NEUROLOGY | Facility: CLINIC | Age: 26
End: 2021-12-28

## 2022-01-07 ENCOUNTER — TELEPHONE (OUTPATIENT)
Dept: NEUROLOGY | Facility: CLINIC | Age: 27
End: 2022-01-07

## 2022-01-07 LAB
10OH-CARBAZEPINE SERPL-MCNC: 18 MCG/ML (ref 8–35)
ALBUMIN SERPL-MCNC: 4.4 G/DL (ref 3.6–5.1)
ALBUMIN/GLOB SERPL: 1.6 (CALC) (ref 1–2.5)
ALP SERPL-CCNC: 65 U/L (ref 31–125)
ALT SERPL-CCNC: 23 U/L (ref 6–29)
AST SERPL-CCNC: 22 U/L (ref 10–30)
BASOPHILS # BLD AUTO: 19 CELLS/UL (ref 0–200)
BASOPHILS NFR BLD AUTO: 0.4 %
BILIRUB SERPL-MCNC: 0.4 MG/DL (ref 0.2–1.2)
BUN SERPL-MCNC: 10 MG/DL (ref 7–25)
BUN/CREAT SERPL: NORMAL (CALC) (ref 6–22)
CALCIUM SERPL-MCNC: 9.3 MG/DL (ref 8.6–10.2)
CHLORIDE SERPL-SCNC: 102 MMOL/L (ref 98–110)
CO2 SERPL-SCNC: 28 MMOL/L (ref 20–32)
CREAT SERPL-MCNC: 0.76 MG/DL (ref 0.5–1.1)
EOSINOPHIL # BLD AUTO: 61 CELLS/UL (ref 15–500)
EOSINOPHIL NFR BLD AUTO: 1.3 %
ERYTHROCYTE [DISTWIDTH] IN BLOOD BY AUTOMATED COUNT: 11.7 % (ref 11–15)
GLOBULIN SER CALC-MCNC: 2.7 G/DL (CALC) (ref 1.9–3.7)
GLUCOSE SERPL-MCNC: 93 MG/DL (ref 65–99)
HCT VFR BLD AUTO: 39.7 % (ref 35–45)
HGB BLD-MCNC: 13 G/DL (ref 11.7–15.5)
LACOSAMIDE SERPL-MCNC: 3.3 MCG/ML
LYMPHOCYTES # BLD AUTO: 1509 CELLS/UL (ref 850–3900)
LYMPHOCYTES NFR BLD AUTO: 32.1 %
MCH RBC QN AUTO: 29.4 PG (ref 27–33)
MCHC RBC AUTO-ENTMCNC: 32.7 G/DL (ref 32–36)
MCV RBC AUTO: 89.8 FL (ref 80–100)
MONOCYTES # BLD AUTO: 338 CELLS/UL (ref 200–950)
MONOCYTES NFR BLD AUTO: 7.2 %
NEUTROPHILS # BLD AUTO: 2773 CELLS/UL (ref 1500–7800)
NEUTROPHILS NFR BLD AUTO: 59 %
PLATELET # BLD AUTO: 241 THOUSAND/UL (ref 140–400)
PMV BLD REES-ECKER: 10.4 FL (ref 7.5–12.5)
POTASSIUM SERPL-SCNC: 4.1 MMOL/L (ref 3.5–5.3)
PROT SERPL-MCNC: 7.1 G/DL (ref 6.1–8.1)
RBC # BLD AUTO: 4.42 MILLION/UL (ref 3.8–5.1)
SL AMB EGFR AFRICAN AMERICAN: 125 ML/MIN/1.73M2
SL AMB EGFR NON AFRICAN AMERICAN: 108 ML/MIN/1.73M2
SODIUM SERPL-SCNC: 137 MMOL/L (ref 135–146)
WBC # BLD AUTO: 4.7 THOUSAND/UL (ref 3.8–10.8)

## 2022-01-07 NOTE — TELEPHONE ENCOUNTER
Spoke to patient  Informed of previous  Verbalized understanding  She has started magnesium and noticed some improvement of eyelid twitching

## 2022-01-07 NOTE — TELEPHONE ENCOUNTER
----- Message from Felicia Olson PA-C sent at 1/7/2022  9:57 AM EST -----  Please let patient know her labs look great  Has she tried the magnesium that we talked about for the twitching? Any change in her eyelid twitching?

## 2022-01-25 ENCOUNTER — TELEPHONE (OUTPATIENT)
Dept: NEUROLOGY | Facility: CLINIC | Age: 27
End: 2022-01-25

## 2022-01-31 NOTE — TELEPHONE ENCOUNTER
[facility]  appt changed

## 2022-02-02 ENCOUNTER — TELEMEDICINE (OUTPATIENT)
Dept: NEUROLOGY | Facility: CLINIC | Age: 27
End: 2022-02-02
Payer: MEDICARE

## 2022-02-02 VITALS — BODY MASS INDEX: 20.55 KG/M2 | WEIGHT: 116 LBS | HEIGHT: 63 IN

## 2022-02-02 DIAGNOSIS — F41.9 ANXIETY: ICD-10-CM

## 2022-02-02 DIAGNOSIS — G40.209 LOCALIZATION-RELATED FOCAL EPILEPSY WITH COMPLEX PARTIAL SEIZURES (HCC): Primary | ICD-10-CM

## 2022-02-02 PROCEDURE — 99214 OFFICE O/P EST MOD 30 MIN: CPT | Performed by: PHYSICIAN ASSISTANT

## 2022-02-02 NOTE — PROGRESS NOTES
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) and co-morbid depression and generalized anxiety disorders  Her current medication regiment of oxcarbazepine and lacosamide seems to be a good combination for her with no seizures and no side effects as the goal      Her depression and anxiety symptoms seem to be much better with the increase on venlafaxine dose  She was having some eyelid twitching around [place] with no other associated neurologic symptoms  She admitted to increased stress and increased caffeine intake  The eyelid twitching has stopped since she started a magnesium oxide supplement and cut back on caffeine  She is also getting better sleep and staying well hydrated  Plan:  1 - continue with oxcarbazepine 600mg tab take one tab twice a day  2 - continue with Lacosamide (Vimpat) 100mg twice a day   3 - continue with Venlafaxine ER 75mg and 37 5mg one tab each daily  4 - continue magnesium supplement and hydration for eye twitching  5 - follow up in 6 months    Problem List Items Addressed This Visit        Nervous and Auditory    Localization-related focal epilepsy with complex partial seizures ([address] ) - Primary       Other    Anxiety               Reason for visit is   Chief Complaint   Patient presents with    Virtual Regular Visit        Encounter provider Danette Rothman PA-C    Provider located at [address] [facility]  [id number]      Recent Visits  No visits were found meeting these conditions    Showing recent visits within past 7 days and meeting all other requirements  Today's Visits  Date Type Provider Dept   02/02/22 Telemedicine Anna Nuñez Jorge Rod PA-C [facility] [place]   Showing today's visits and meeting all other requirements  Future Appointments  No visits were found meeting these conditions  Showing future appointments within next 150 days and meeting all other requirements       The patient was identified by name and date of birth  Josefa Alfaro was informed that this is a telemedicine visit and that the visit is being conducted through [facility] and patient was informed this is a secure, HIPAA-complaint platform  She agrees to proceed     My office door was closed  No one else was in the room  She acknowledged consent and understanding of privacy and security of the video platform  The patient has agreed to participate and understands they can discontinue the visit at any time  Patient is aware this is a billable service  Subjective  Josefa Alfaro is a 32 y o  female    HPI   Josefa Alfaro is a 32 y o  right handed female who presents today for neurologic follow up  Interval history 2/2/2022:  She is currently doing well  She has not had any breakthrough seizures and tolerating her medications well  She called the office in December 21 reporting some eyelid twitching on and off  This had been present in the past, but was bothering her more  It was intermittent throughout the day and not associated with any other symptoms such as altered awareness, paresthesias  She admitted to increased stress around the [facility], as well as increased caffeine intake  She had labs done, which all looked good (AED levels, electrolytes)  She was advised to try a magnesium oxide supplement, cut down on caffeine, stay hydrated and get adequate sleep  She reports today that the eyelid twitching has stopped completely  She is still taking the magnesium supplement daily    She is sleeping well, reports better than in the past   Stress is still there, but feels she is coping with things better in general      AED/side effects/compliance:  Oxcarbazepine 600-600  Lacosamide 100-100  No missed doses     Event/Seizure semiology:  1  Lightheaded blurry vision, loss of consciousness, convulsion  (Last one 8/24/2016)  2  Blurry vision, circles in a part of her visual field (left side), followed by a period of headache, no clear confusion or disorientation (described in January 2017 visit) - recurs 2-3 times a month     Women of childbearing age:  Contraception: not sexual activity  Folic acid: no - does not want at this point     Prior Epilepsy History:  Initial intake epilepsy history 9/12/2016  Review of hospital records  Ms Olaf Gómez was admitted from 8/24-8/26/2016 to [facility] after a witnessed seizure  She had an MRI brain study that showed cortical malformations including heterotopias, she was subsequently started on levetiracetam 500mg twice a day  Dr Radu Milner did the initial consultation; she reported that her first seizure was in June 2016, onset of lightheadedness, blurry vision, no memory of passing out but she collapsed, the next thing she remembered was waking up in the ambulance  On the day of admission she felt lightheaded, blurry vision, veering to the sides of the aisle and collapsed, her mother witnessed some of the event but only after she fell; patients eyes were open and mouth was moving, arms were tonically flexed and asymmetric and legs were hypertonic with repeated relaxation and tonic stiffening  Again she did not regain consciousness until she was in the ambulance  She had a history of one febrile seizure, a paternal great grandmother with seizures, head CT scan found ventriculomegaly  Patients history  June 2016, Ms Cunningham recalled that she was at work and the next thing she remembered was people standing over her, she did not remember what happened before other than feeling lightheaded and blurry vision    She denied feeling exhausted, new medications, drugs, illness/fevers, and there were no changes in her usual habits  August 2016, again she does not recall much other than being told she had a seizure for less minute  Her mother reported that she did see the end of the seizure; initially Ms Supriya [place] had her the arms and legs extended outward, then arms were flexed inward, convulsion lasted about 30 minute, and she was unresponsive for about 5 minutes but she was lethargic for about 15-20 minutes afterwards  Her mother had briefly turned away and did not witness the very start of the seizure  Again, there were no alcohol, drugs, medications, or illness experienced by Ms Cunningham  She denies any history of myoclonus, staring spells, automatisms, unexplained hyperkinetic behaviors, olfactory / gustatory hallucinations, epigastric rising events, boogie vu events, visual hallucinations, unexplained nocturnal enuresis, or nocturnal tongue biting  At 17 months, she had a cold with a super high temperature, she had a seizure, deemed to have been a simple febrile seizure  LEV was associated with daily headaches that come and go throughout the day may last for a couple of hours at a time  These are very severe because she does not do well with pain  Headache is best describe as a throbbing sensation for a couple of hours, daily, she does not take any medication for pain because she is not sure if she can  There is no visual changes, no loss of vision, or changes in refraction  Summary 2017  Switched LVT to [facility], better tolerated, over the course of 2017, OXC was escalated for recurrent visual distortions of the left side, circles, blurry vision (sensation of visual obscuration lasting for 2 minutes, left side swirls, if severe enough that she would temporarily not see clearly on the left visual field), lasting minutes without altered awareness or loss of consciousness  There is a headache that follows for about half an hour  These visual distortions have been sporadic once 1-5 months    We had tried gabapentin in the Spring/summer 2017 but she stopped taking it because of side effects / headaches (horrible, feeling like everything was in slow motion)  Sometimes these visual distortions are triggered by stress at work  She works part time as a  4-5 hours  I wrote a letter on her behalf to get a 10-15 minutes break every 2 hours  She reports issues with anxiety, mostly as it involves the possibility of a recurrent seizure  She feels like she has been looking over her shoulder because of her epilepsy  She tried [employer] but found it useless  She did get her 's license back with [place] report of occasional visual seizures  Summary 2018  Started with -1200  She continues to have focal visual seizures, visual disturbances of circles on the left visual field followed by a headache, no disorientation or confusion; episodic about 1-2 times a month  We had tried higher dose of OXC but she was having balance and cognitive issues  We tried adding Lyrica, but no improvement, weaned off and started LMG  She is driving but she has avoided the highways due to her seizures  She has enough of a warning to stop the car or pull over on local streets  Struggles with depression and anxiety  Summary 2019  -600, -200  There was no improvement with the addition of lamotrigine to the prevention of visual auras, which continue to be infrequent events (presumed focal visual seizures)  Combination of OXC and LMG caused double vision, along with headaches, nausea  Due to headaches we started her on zonisamide and took her off of lamotrigine  Once off lamotrigine, headaches resolved    She struggles with her emotions including cutting behaviors, anxiety, and depression; however, she refuses to accept referral to seeing a behavioral health specialist   She has stress from 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, she likes whom she works with but not who she works for) and family members (who don't get her)  Summary   -600,   Weaned off of lamotrigine due to recurrent headaches  Trial of zonisamide caused weight loss despite good appetite  She did not have recurrent visual seizure until zonisamide was stopped  She was getting therapy at [place]); not sure if it helped with depression and anxiety (she reported it was getting worse midway through the year)  We increased Effexor include 37 5mg daily  By the end of the year, she was started on Vimpat  Interval history 3/22/2021  She had a follow-up with Nanette Ireland on 2020, she reported discontinuation of zonisamide due to persistent weight loss  There was a focal visual seizure  Lacosamide was started  She continues to have stress and stopped having visits with her therapist due to [address]  She is not bothered by the addition of lacosamide  There has been no focal visual aura, loss of consciousness, or altered awareness  She reports that she has gained 4 pounds  She has a good appetite  She has not noticed recurrence of headaches  She feels that her mood is much better  She has been dealing with stress by trying to minimize it and keeping herself busy outside of work  She is keeping busy with crafting  She wondering if she can get a vaccine  Interval history 2021:  She is currently doing well  She seems bright and happy today, says her mood has been good in general  She is working and staying busy  She has not had any breakthrough seizures and tolerating her medications well  She denies significant headaches  Appetite has been good, gained 5lbs since last visit  She did get her COVID vaccine in the Spring and tolerated well       Special Features  Status epilepticus: No  Self Injury Seizures: No  Precipitating Factors: None     Epilepsy Risk Factors:  Abnormal pregnancy: No  Abnormal birth/: No  Abnormal Development: walked after a year, slower with language  Febrile seizures, simple: Yes  Febrile seizures, complex: No  CNS infection: No  Mental retardation: No  Cerebral palsy: No  Head injury (moderate/severe): No  CNS neoplasm: No  CNS malformation: Yes - MRI brain found ventriculomegaly, abnormal splenium of corpus callosum, and subependymal heterotopia  Neurosurgical procedure: No  Stroke: No  Alcohol abuse: No  Drug abuse: No  Family history Sz/epilepsy: No     Prior AEDs:  Levetiracetam (headaches), oxcarbazepine (continued to have visual aura), gabapentin (side effect), pregabalin (continues to have visual auras and very emotional), lamotrigine (caused more headaches), zonisamide (complaints of weight loss), lacosamide     Prior workup:      [id number]   MRI brain  Bilateral subependymal heterotopias in the region of the atria of the lateral ventricles with bilateral colpocephaly  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  Solitary nonspecific FLAIR hyperintensity subcortical white matter right frontal lobe     2017  MRI brain  Stable colpocephalic appearance to the ventricles, right greater than left, bowing / stretching of the posterior corpus callosum  Subtle foci nodular heterotopia along atria of both lateral ventricles more apparent on the right  Tiny focus of increased signal in the white matter of the right frontal horn is less apparent  Hippocampi are somewhat dysmorphic bilaterally     2019  MRI alonso w/wo  Single white matter hyperintensity in the right anterior superior frontal lobe (unchanged)  Stable colpocephaly of the lateral ventricles, right greater than left  Symmetric hippocampal formations  No heterotopia was identified     EEGs:  2016  Intermittent focal slowing over the right posterior temporal region but limited to the T6 electrode, consider repeating the study     2016  Photic driving is lateralized to the left; thus, possibly relative neuronal dysfunction in the right posterior region  4/23/2018  Frequent, right posterior temporal delta slowing and occipital sharp activity during 10 Hz photic stimulation      Labs:  1/3/2022  Component Value Flag Ref Range Units Status   Lacosamide 3 3    mcg/mL Final     Component Value Flag Ref Range Units Status   10-Hydroxycarbazepine 18 0   8 0 - 35 0        Ref Range Units Status     White Blood Cell Count 4 7   3 8 - 10 8 Thousand/uL Final   Red Blood Cell Count 4 42   3 80 - 5 10 Million/uL Final   Hemoglobin 13 0   11 7 - 15 5 g/dL Final   HCT 39 7   35 0 - 45 0 % Final   MCV 89 8   80 0 - 100 0 fL Final   MCH 29 4   27 0 - 33 0 pg Final   MCHC 32 7   32 0 - 36 0 g/dL Final   RDW 11 7   11 0 - 15 0 % Final   Platelet Count 960   140 - 400 Thousand/uL Final   SL AMB MPV 10 4   7 5 - 12 5 fL Final   Neutrophils (Absolute) 2,773   1,500 - 7,800 cells/uL Final   Lymphocytes (Absolute) 1,509   850 - 3,900 cells/uL Final   Monocytes (Absolute) 338   200 - 950 cells/uL Final   Eosinophils (Absolute) 61   15 - 500 cells/uL Final   Basophils ABS 19   0 - 200 cells/uL Final   Neutrophils 59    % Final   Lymphocytes 32 1    % Final   Monocytes 7 2    % Final   Eosinophils 1 3    % Final   Basophils PCT 0 4    % Final     Flag Ref Range Units Status     Glucose, Random 93   65 - 99 mg/dL Final   Comment:                  Fasting reference interval        BUN 10   7 - 25 mg/dL Final   Creatinine 0 76   0 50 - 1 10 mg/dL Final   eGFR Non  108   > OR = 60 mL/min/1 73m2 Final   eGFR  125   > OR = 60 mL/min/1 73m2 Final   SL AMB BUN/CREATININE RATIO NOT APPLICABLE   6 - 22 (calc) Final   Sodium 137   135 - 146 mmol/L Final   Potassium 4 1   3 5 - 5 3 mmol/L Final   Chloride 102   98 - 110 mmol/L Final   CO2 28   20 - 32 mmol/L Final   Calcium 9 3   8 6 - 10 2 mg/dL Final   Protein, Total 7 1   6 1 - 8 1 g/dL Final   Albumin 4 4   3 6 - 5 1 g/dL Final   Globulin 2 7   1 9 - 3 7 g/dL (calc) Final Albumin/Globulin Ratio 1 6   1 0 - 2 5 (calc) Final   TOTAL BILIRUBIN 0 4   0 2 - 1 2 mg/dL Final   Alkaline Phosphatase 65   31 - 125 U/L Final   AST 22   10 - 30 U/L Final   ALT 23   6 - 29 U/L          Past Medical History:   Diagnosis Date    Cerebral ventriculomegaly 8/25/2016    Seizures ([address] )     Self-injurious behavior 9/9/2019    Cutting behavior    Syncopal episodes        History reviewed  No pertinent surgical history  Current Outpatient Medications   Medication Sig Dispense Refill    ondansetron (ZOFRAN) 4 mg tablet TAKE 1 TABLET BY MOUTH EVERY 8 HOURS AS NEEDED FOR NAUSEA AND VOMITING 20 tablet 0    OXcarbazepine (TRILEPTAL) 600 mg tablet TAKE 1 TABLET (600 MG TOTAL) BY MOUTH EVERY 12 (TWELVE) HOURS 60 tablet 11    venlafaxine (EFFEXOR-XR) 37 5 mg 24 hr capsule TAKE 1 CAPSULE BY MOUTH EVERY DAY 30 capsule 8    venlafaxine (EFFEXOR-XR) 75 mg 24 hr capsule TAKE 1 CAPSULE BY MOUTH EVERY DAY 30 capsule 11    Vimpat 100 MG tablet TAKE 1 TABLET BY MOUTH EVERY 12 HOURS 60 tablet 5     No current facility-administered medications for this visit  No Known Allergies    Review of Systems   Constitutional: Negative for chills and fever  HENT: Negative for ear pain and sore throat  Eyes: Negative for pain and visual disturbance  Respiratory: Negative for cough and shortness of breath  Cardiovascular: Negative for chest pain and palpitations  Gastrointestinal: Negative for abdominal pain and vomiting  Genitourinary: Negative for dysuria and hematuria  Musculoskeletal: Negative for arthralgias and back pain  Skin: Negative for color change and rash  Neurological: Negative for seizures and syncope  All other systems reviewed and are negative  Video Exam    Vitals:    02/02/22 0809   Weight: 52 6 kg (116 lb)   Height: 5' 3" (1 6 m)       Physical Exam  Constitutional:       Appearance: Normal appearance  Neurological:      Mental Status: She is alert        Comments: Mental status: AO x 3, able to follow commands, no dysarthria or aphasia    CN 1: not tested  CN 2: not tested  CN 3, 4, 6: no noticeable palsy, EOMs intact  CN 5: not tested  CN 7: face symmetrical  CN 8: hearing intact to voice  CN 9: not tested  CN 10: not tested  CN 11: shoulder shrug symmetric   CN 12: tongue midline     Motor:  No abnormal movements or focal weakness appreciated     Coordination:  No dysmetria on FTN    Psychiatric:         Mood and Affect: Mood normal          Behavior: Behavior normal           I spent 15 minutes directly with the patient during this visit    VIRTUAL VISIT [facility] verbally agrees to participate in [employer]  Pt is aware that [employer] could be limited without vital signs or the ability to perform a full hands-on physical exam  Jenn Hunter understands she or the provider may request at any time to terminate the video visit and request the patient to seek care or treatment in person

## 2022-04-25 DIAGNOSIS — R11.0 NAUSEA: ICD-10-CM

## 2022-04-25 RX ORDER — ONDANSETRON 4 MG/1
TABLET, FILM COATED ORAL
Qty: 20 TABLET | Refills: 0 | Status: SHIPPED | OUTPATIENT
Start: 2022-04-25

## 2022-05-15 DIAGNOSIS — F33.1 MODERATE EPISODE OF RECURRENT MAJOR DEPRESSIVE DISORDER (HCC): ICD-10-CM

## 2022-05-16 RX ORDER — VENLAFAXINE HYDROCHLORIDE 37.5 MG/1
CAPSULE, EXTENDED RELEASE ORAL
Qty: 30 CAPSULE | Refills: 8 | Status: SHIPPED | OUTPATIENT
Start: 2022-05-16 | End: 2022-08-08 | Stop reason: SDUPTHER

## 2022-07-11 ENCOUNTER — TELEPHONE (OUTPATIENT)
Dept: NEUROLOGY | Facility: CLINIC | Age: 27
End: 2022-07-11

## 2022-07-11 NOTE — TELEPHONE ENCOUNTER
I called and left a voicemail message about patients upcoming appointment with Dr Aquino on 8/8/22 @ 8 am  I requested a call back to our office to confirm the appointment or if the patient has any issues or concerns or cannot keep this appointment 
Strong peripheral pulses

## 2022-08-08 ENCOUNTER — OFFICE VISIT (OUTPATIENT)
Dept: NEUROLOGY | Facility: CLINIC | Age: 27
End: 2022-08-08
Payer: MEDICARE

## 2022-08-08 VITALS
WEIGHT: 120 LBS | OXYGEN SATURATION: 98 % | HEART RATE: 86 BPM | HEIGHT: 62 IN | DIASTOLIC BLOOD PRESSURE: 70 MMHG | BODY MASS INDEX: 22.08 KG/M2 | SYSTOLIC BLOOD PRESSURE: 118 MMHG | RESPIRATION RATE: 18 BRPM | TEMPERATURE: 98.5 F

## 2022-08-08 DIAGNOSIS — G40.209 LOCALIZATION-RELATED FOCAL EPILEPSY WITH COMPLEX PARTIAL SEIZURES (HCC): ICD-10-CM

## 2022-08-08 DIAGNOSIS — F33.1 MODERATE EPISODE OF RECURRENT MAJOR DEPRESSIVE DISORDER (HCC): ICD-10-CM

## 2022-08-08 PROCEDURE — 99214 OFFICE O/P EST MOD 30 MIN: CPT | Performed by: PSYCHIATRY & NEUROLOGY

## 2022-08-08 RX ORDER — VENLAFAXINE HYDROCHLORIDE 37.5 MG/1
37.5 CAPSULE, EXTENDED RELEASE ORAL DAILY
Qty: 90 CAPSULE | Refills: 3 | Status: SHIPPED | OUTPATIENT
Start: 2022-08-08

## 2022-08-08 RX ORDER — VENLAFAXINE HYDROCHLORIDE 75 MG/1
75 CAPSULE, EXTENDED RELEASE ORAL DAILY
Qty: 90 CAPSULE | Refills: 3 | Status: SHIPPED | OUTPATIENT
Start: 2022-08-08

## 2022-08-08 RX ORDER — LACOSAMIDE 150 MG/1
150 TABLET ORAL EVERY 12 HOURS SCHEDULED
Qty: 60 TABLET | Refills: 5 | Status: SHIPPED | OUTPATIENT
Start: 2022-08-08

## 2022-08-08 RX ORDER — OXCARBAZEPINE 300 MG/1
450 TABLET, FILM COATED ORAL EVERY 12 HOURS SCHEDULED
Qty: 270 TABLET | Refills: 1 | Status: SHIPPED | OUTPATIENT
Start: 2022-08-08

## 2022-08-08 NOTE — PROGRESS NOTES
Patient ID: Shanique Grey is a 32 y o  female  Assessment/Plan:    No problem-specific Assessment & Plan notes found for this encounter  {Assess/PlanSmartLinks:52220}       Subjective:    HPI    {[facility] HPI texts:70451}    {Common ambulatory SmartLinks:43494}         Objective:    Blood pressure 118/70, pulse 86, temperature 98 5 °F (36 9 °C), temperature source Tympanic, resp  rate 18, height 5' 2" (1 575 m), weight 54 4 kg (120 lb), SpO2 98 %  Physical Exam    Neurological Exam      ROS:    Review of Systems   Constitutional: Negative  Negative for appetite change and fever  HENT: Negative  Negative for hearing loss, tinnitus, trouble swallowing and voice change  Eyes: Negative  Negative for photophobia and pain  Respiratory: Negative  Negative for shortness of breath  Cardiovascular: Negative  Negative for palpitations  Gastrointestinal: Negative  Negative for nausea and vomiting  Endocrine: Negative  Negative for cold intolerance  Genitourinary: Negative  Negative for dysuria, frequency and urgency  Musculoskeletal: Negative  Negative for myalgias and neck pain  Skin: Negative  Negative for rash  Allergic/Immunologic: Negative  Neurological: Negative  Negative for dizziness, tremors, seizures, syncope, facial asymmetry, speech difficulty, weakness, light-headedness, numbness and headaches  Hematological: Bruises/bleeds easily  Psychiatric/Behavioral: Negative for confusion, hallucinations and sleep disturbance  The patient is nervous/anxious           Deppression, anxiety and mood swings

## 2022-08-08 NOTE — PATIENT INSTRUCTIONS
Plan:   1 - continue with oxcarbazepine 600mg tab take one tab twice a day; until you run out of 600mg tabs then take oxcarbazepine 300mg tab take one and a half tab twice a day  2 - continue with Lacosamide (Vimpat) 100mg twice a day; until next refill you'll get Lacosamide 150mg tab take one tab twice a day  3 - continue with Venlafaxine ER 75mg and 37 5mg one tab each daily  4 - follow-up with advanced practitioner 3 months  5 - in about 2 months go for blood work to check lacosamide and oxcarbazepine level  6 - call the office if you experience recurrence of visual seizure, if you are having issues with medication side effects, worsening depression and anxiety symptoms  7 - try to not drive for about 30 days when you reduce your oxcarbazepine dose, avoid ladders if possible    Jacobo the office if you have episodes of loss of consciousness, confusional spells, or periods of abnormal behaviors

## 2022-08-08 NOTE — PROGRESS NOTES
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) and co-morbid depression and generalized anxiety disorders  She has done very well for the past year on current set of medications in management of her epilepsy and her mood/anxiety disorders  While on oxcarbazepine, she did not have bilateral tonic clonic seizures, but continued to have episodic visual sensory seizures  Medication trials failed from either side effects or continued focal sensory seizures  But with the addition of lacosamide, she has not had recurrence of her focal sensory seizures, which likely contributes to her good outlook  During this office visit, we considered transitioning from dual therapy to monotherapy  Lacosamide can be effective as monotherapy  I would prefer that she is on a higher dose of lacosamide and start a slow weaning process off of oxcarbazepine  Risk of recurrent seizure is about 10-20% when we try to wean off of an effective medication  But, fewer medications can reduce potential side effects of medications  She is in favor of transitioning to monotherapy if possible but understands the risk of coming off of oxcarbazepine  Discussed that she avoid driving for about a month when oxcarbazepine dose is reduced  Headaches are less prevalent (could have been due to lamotrigine), but more routine headaches at are manageable with acetaminophen  She should continue with venlafaxine 112 5mg daily as it has helped her mood and anxiety symptoms      Plan:   1 - continue with oxcarbazepine 600mg tab take one tab twice a day; until you run out of 600mg tabs then take oxcarbazepine 300mg tab take one and a half tab twice a day  2 - continue with Lacosamide (Vimpat) 100mg twice a day; until next refill you'll get Lacosamide 150mg tab take one tab twice a day  3 - continue with Venlafaxine ER 75mg and 37 5mg one tab each daily  4 - follow-up with advanced practitioner 3 months  5 - in about 2 months go for blood work to check lacosamide and oxcarbazepine level  6 - call the office if you experience recurrence of visual seizure, if you are having issues with medication side effects, worsening depression and anxiety symptoms  7 - try to not drive for about 30 days when you reduce your oxcarbazepine dose, avoid ladders if possible  Jacobo the office if you have episodes of loss of consciousness, confusional spells, or periods of abnormal behaviors    Problem List Items Addressed This Visit        Nervous and Auditory    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)    Relevant Medications    lacosamide (VIMPAT) 150 mg tablet    OXcarbazepine (TRILEPTAL) 300 mg tablet    Other Relevant Orders    Lacosamide    Oxcarbazepine level       Other    Moderate episode of recurrent major depressive disorder (HCC)    Relevant Medications    venlafaxine (EFFEXOR-XR) 75 mg 24 hr capsule    venlafaxine (EFFEXOR-XR) 37 5 mg 24 hr capsule        Chief Complaint:    Chief Complaint     Seizures        HPI:    Rigoberto Marks is a 32 y o  right handed female here for follow-up evaluation of focal seizures, visual distortions with headache and difficulty dealing with her emotions  Interval history 8/8/2022  She feels good  She has not had a seizure   She is happy because she has been able to gain two pounds  She feels that she has no appetite but she has a friend who helps her push to eat  She has not had recurrence of eye lid twitching, but she feels that the magnesium was triggering  She is on gummy multivitamins and iron supplementation  She was able to get her COVID19 booster  She is hoping to try to reduce her medication load    She has been having headaches but not as much, about once a week, but no nausea  She usually takes an acetaminophen with good relief  She has had some bad news regarding friends from [employer], one has dementia and the other has kidney failure  She has been participating in her [employer] more to keep her mood up and keep herself busy  She is not planning on having children in the near future  AED/side effects/compliance:  Oxcarbazepine 600-600  Lacosamide 100-100  Good compliance  Event/Seizure semiology:  1  Lightheaded blurry vision, loss of consciousness, convulsion  (Last one 8/24/2016)  2  Blurry vision, circles in a part of her visual field (left side), followed by a period of headache, no clear confusion or disorientation (described in January 2017 visit) - recurs 2-3 times a month    Women of childbearing age:  Contraception: not sexual activity  Folic acid: no - does not want at this point    Prior Epilepsy History:  Initial intake epilepsy history 9/12/2016  Review of hospital records  Ms Sha Sanchez was admitted from 8/24-8/26/2016 to [place] after a witnessed seizure  She had an MRI brain study that showed cortical malformations including heterotopias, she was subsequently started on levetiracetam 500mg twice a day  Dr Thomas Cunningham did the initial consultation; she reported that her first seizure was in June 2016, onset of lightheadedness, blurry vision, no memory of passing out but she collapsed, the next thing she remembered was waking up in the ambulance  On the day of admission she felt lightheaded, blurry vision, veering to the sides of the aisle and collapsed, her mother witnessed some of the event but only after she fell; patients eyes were open and mouth was moving, arms were tonically flexed and asymmetric and legs were hypertonic with repeated relaxation and tonic stiffening  Again she did not regain consciousness until she was in the ambulance    She had a history of one febrile seizure, a paternal great grandmother with seizures, head CT scan found ventriculomegaly  Patients history  June 2016, Ms Cunningham recalled that she was at work and the next thing she remembered was people standing over her, she did not remember what happened before other than feeling lightheaded and blurry vision  She denied feeling exhausted, new medications, drugs, illness/fevers, and there were no changes in her usual habits  August 2016, again she does not recall much other than being told she had a seizure for less minute  Her mother reported that she did see the end of the seizure; initially Ms Jenny Yuen had her the arms and legs extended outward, then arms were flexed inward, convulsion lasted about 30 minute, and she was unresponsive for about 5 minutes but she was lethargic for about 15-20 minutes afterwards  Her mother had briefly turned away and did not witness the very start of the seizure  Again, there were no alcohol, drugs, medications, or illness experienced by Ms Cunningham  She denies any history of myoclonus, staring spells, automatisms, unexplained hyperkinetic behaviors, olfactory / gustatory hallucinations, epigastric rising events, boogie vu events, visual hallucinations, unexplained nocturnal enuresis, or nocturnal tongue biting  At 17 months, she had a cold with a super high temperature, she had a seizure, deemed to have been a simple febrile seizure  LEV was associated with daily headaches that come and go throughout the day may last for a couple of hours at a time  These are very severe because she does not do well with pain  Headache is best describe as a throbbing sensation for a couple of hours, daily, she does not take any medication for pain because she is not sure if she can  There is no visual changes, no loss of vision, or changes in refraction       Summary 2017  Switched LVT to 1937 [employer], better tolerated, over the course of 2017, OXC was escalated for recurrent visual distortions of the left side, circles, blurry vision (sensation of visual obscuration lasting for 2 minutes, left side swirls, if severe enough that she would temporarily not see clearly on the left visual field), lasting minutes without altered awareness or loss of consciousness  There is a headache that follows for about half an hour  These visual distortions have been sporadic once 1-5 months  We had tried gabapentin in the Spring/summer 2017 but she stopped taking it because of side effects / headaches (horrible, feeling like everything was in slow motion)  Sometimes these visual distortions are triggered by stress at work  She works part time as a  4-5 hours  I wrote a letter on her behalf to get a 10-15 minutes break every 2 hours  She reports issues with anxiety, mostly as it involves the possibility of a recurrent seizure  She feels like she has been looking over her shoulder because of her epilepsy  She tried Web[employer]ase but found it useless  She did get her 's license back with [employer] report of occasional visual seizures  Summary 2018  Started with -1200  She continues to have focal visual seizures, visual disturbances of circles on the left visual field followed by a headache, no disorientation or confusion; episodic about 1-2 times a month  We had tried higher dose of OXC but she was having balance and cognitive issues  We tried adding Lyrica, but no improvement, weaned off and started LMG  She is driving but she has avoided the highways due to her seizures  She has enough of a warning to stop the car or pull over on local streets  Struggles with depression and anxiety  Summary 2019  -600, -200  There was no improvement with the addition of lamotrigine to the prevention of visual auras, which continue to be infrequent events (presumed focal visual seizures)  Combination of OXC and LMG caused double vision, along with headaches, nausea    Due to headaches we started her on zonisamide and took her off of lamotrigine  Once off lamotrigine, headaches resolved  She struggles with her emotions including cutting behaviors, anxiety, and depression; however, she refuses to accept referral to seeing a behavioral health specialist   She has stress from 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, she likes whom she works with but not who she works for) and family members (who don't get her)  Summary 2020  -600,   Weaned off of lamotrigine due to recurrent headaches  Trial of zonisamide caused weight loss despite good appetite  She did not have recurrent visual seizure until zonisamide was stopped  She was getting therapy at [facility] Hortencia Adriana [facility]); not sure if it helped with depression and anxiety (she reported it was getting worse midway through the year)  We increased Effexor include 37 5mg daily  By the end of the year, she was started on Vimpat  Summary   -600, -100  Discontinued zonisamide due to persistent weight loss  Since being on lacosamide, she has not had a focal sensory / visual seizure  She has been taking magnesium supplements  Since being on Effexor her mood has been better        Special Features  Status epilepticus: No  Self Injury Seizures: No  Precipitating Factors: None    Epilepsy Risk Factors:  Abnormal pregnancy: No  Abnormal birth/: No  Abnormal Development: walked after a year, slower with language  Febrile seizures, simple: Yes  Febrile seizures, complex: No  CNS infection: No  Mental retardation: No  Cerebral palsy: No  Head injury (moderate/severe): No  CNS neoplasm: No  CNS malformation: Yes - MRI brain found ventriculomegaly, abnormal splenium of corpus callosum, and subependymal heterotopia  Neurosurgical procedure: No  Stroke: No  Alcohol abuse: No  Drug abuse: No  Family history Sz/epilepsy: No    Prior AEDs:  Levetiracetam (headaches), oxcarbazepine (continued to have visual aura), gabapentin (side effect), pregabalin (continues to have visual auras and very emotional), lamotrigine (caused more headaches), zonisamide (complaints of weight loss), lacosamide    Prior workup:      [id number]   MRI brain  Bilateral subependymal heterotopias in the region of the atria of the lateral ventricles with bilateral colpocephaly  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  Solitary nonspecific FLAIR hyperintensity subcortical white matter right frontal lobe    2017  MRI brain  Stable colpocephalic appearance to the ventricles, right greater than left, bowing / stretching of the posterior corpus callosum  Subtle foci nodular heterotopia along atria of both lateral ventricles more apparent on the right  Tiny focus of increased signal in the white matter of the right frontal horn is less apparent  Hippocampi are somewhat dysmorphic bilaterally    2019  MRI alonso w/wo  Single white matter hyperintensity in the right anterior superior frontal lobe (unchanged)  Stable colpocephaly of the lateral ventricles, right greater than left  Symmetric hippocampal formations  No heterotopia was identified    EEGs:  2016  Intermittent focal slowing over the right posterior temporal region but limited to the T6 electrode, consider repeating the study    2016  Photic driving is lateralized to the left; thus, possibly relative neuronal dysfunction in the right posterior region  2018  Frequent, right posterior temporal delta slowing and occipital sharp activity during 10 Hz photic stimulation      Labs:  Component      Latest Ref Rng & Units 1/3/2022   White Blood Cell Count      3 8 - 10 8 Thousand/uL 4 7   Red Blood Cell Count      3 80 - 5 10 Million/uL 4 42   Hemoglobin      11 7 - 15 5 g/dL 13 0   HCT      35 0 - 45 0 % 39 7   Platelet Count      690 - 400 Thousand/uL 241   Glucose, Random      65 - 99 mg/dL 93   BUN      7 - 25 mg/dL 10 Creatinine      0 50 - 1 10 mg/dL 0 76   Sodium      135 - 146 mmol/L 137   Potassium      3 5 - 5 3 mmol/L 4 1   Chloride      98 - 110 mmol/L 102   CO2      20 - 32 mmol/L 28   Calcium      8 6 - 10 2 mg/dL 9 3   Total Protein      6 1 - 8 1 g/dL 7 1   Albumin      3 6 - 5 1 g/dL 4 4   Globulin      1 9 - 3 7 g/dL (calc) 2 7   Albumin/Globulin Ratio      1 0 - 2 5 (calc) 1 6   TOTAL BILIRUBIN      0 2 - 1 2 mg/dL 0 4   Alkaline Phosphatase      31 - 125 U/L 65   AST      10 - 30 U/L 22   ALT      6 - 29 U/L 23   10-HYDROXYCARBAZEPINE      8 0 - 35 0 mcg/mL 18 0   Lacosamide      mcg/mL 3 3     General exam   /70 (BP Location: Right arm, Patient Position: Sitting, Cuff Size: Standard)   Pulse 86   Temp 98 5 °F (36 9 °C) (Tympanic)   Resp 18   Ht 5' 2" (1 575 m)   Wt 54 4 kg (120 lb)   SpO2 98%   BMI 21 95 kg/m²    Appearance: normally developed, appears well  Carotids: not assessed  Cardiovascular: regular rate and rhythm and normal heart sounds  Pulmonary: clear to auscultation  Abdominal: nondistended  Extremities: no edema    HEENT: anicteric and moist mucus membranes / oral cavity   Fundoscopy: not assessed    Mental status  Orientation: alert and oriented to name, place, time  Fund of Knowledge: intact   Attention and Concentration: intact  Current and Remote Memory:intact  Language: spontaneous speech is normal and comprehension is intact    Cranial Nerves  CN 1: not tested  CN 2: Visual fields intact to confrontation and pupils equal round reactive to direct and consenual light   CN 3, 4, 6: EOMI, no nystagmus  CN 5:sensation intact to all distribution V1, V2, V3  CN 7:muscles of facial expression are symmetric  CN 8:not assessed  CN 9, 10:no dysarthria present  CN 11:symmetric SCM with head turns  CN 12:tongue is midline    Motor:  Bulk, Tone: normal bulk, normal tone  Pronation: no pronator drift  Strength: Symmetric strength of the arms and legs, no lateralizing weakness   Abnormal movements: no abnormal movements are present    Sensory:  Lighttouch: intact in all limbs  Romberg:not assessed    Coordination:  FNF:FNF bilaterally intact  ERICA:intact  FFM:intact  Gait/Station:normal gait    Reflexes:  not assessed    Past Medical/Surgical History:  Patient Active Problem List   Diagnosis    Anxiety    Congenital cerebral ventriculomegaly ([address] )    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)    Moderate episode of recurrent major depressive disorder (HCC)    Periventricular heterotopia (HCC)    Headache, unspecified headache type    Visual distortions    Nausea    Chronic right-sided low back pain    Claustrophobia     History reviewed  No pertinent surgical history      Past Psychiatric History:  Depression: Yes  Anxiety: Yes  Psychosis: No    Medications:    Current Outpatient Medications:     lacosamide (VIMPAT) 150 mg tablet, Take 1 tablet (150 mg total) by mouth every 12 (twelve) hours, Disp: 60 tablet, Rfl: 5    ondansetron (ZOFRAN) 4 mg tablet, Take 1 po q8hrs PRN nausea and vomiting, Disp: 20 tablet, Rfl: 0    OXcarbazepine (TRILEPTAL) 300 mg tablet, Take 1 5 tablets (450 mg total) by mouth every 12 (twelve) hours, Disp: 270 tablet, Rfl: 1    venlafaxine (EFFEXOR-XR) 37 5 mg 24 hr capsule, Take 1 capsule (37 5 mg total) by mouth daily Take with one 75mg capsule, Disp: 90 capsule, Rfl: 3    venlafaxine (EFFEXOR-XR) 75 mg 24 hr capsule, Take 1 capsule (75 mg total) by mouth daily With one 37 5mg cap, Disp: 90 capsule, Rfl: 3    Allergies:  No Known Allergies    Family history:  Family History   Problem Relation Age of Onset    No Known Problems Mother     No Known Problems Father     No Known Problems Brother      Paternal Moshe George Grandmother started to have seizures later in life  No developmental issues  Parents are healthy    Social History  Living situation:  lives with parents  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, works in retail; 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[employer]); sometimes she needs to use a ladder  Driving:  active license   reports that she has never smoked  She has never used smokeless tobacco  She reports current alcohol use  She reports that she does not use drugs  Review of Systems  A review of at least 12 organ/systems was obtained by the medical assistant and reviewed by me, including additional positives/negatives:  Hematological: Bruises/bleeds easily  Psychiatric/Behavioral: Negative for confusion, hallucinations and sleep disturbance  The patient is nervous/anxious  Deppression, anxiety and mood swings        Decision making was of high-complexity due to the patient's high risk condition (seizures), psychiatric and neuropsychological comorbidities, behavioral problems, memory and cognitive problems and medication side effects  The total amount of time spent with the patient along with pre-chart and post-chart preparation was 33 minutes on the calendar day of the date of service  This included history taking, physical exam, review of ancillary testing, counseling provided to the patient regarding diagnosis, medications, treatment, and risk management, and other communication to the patient's providers and/or family    Start time: 08:00AM  End time: 08:33AM

## 2022-10-21 ENCOUNTER — TELEPHONE (OUTPATIENT)
Dept: NEUROLOGY | Facility: CLINIC | Age: 27
End: 2022-10-21

## 2022-10-21 NOTE — TELEPHONE ENCOUNTER
Called and spoke to pt who confirmed upcoming appt on 11/08/22  Advised pt to complete labs prior to appt, pt stated she is getting her labs done today

## 2022-10-26 LAB
10OH-CARBAZEPINE SERPL-MCNC: 9.5 MCG/ML (ref 8–35)
LACOSAMIDE SERPL-MCNC: 6.2 MCG/ML

## 2022-10-28 ENCOUNTER — TELEPHONE (OUTPATIENT)
Dept: NEUROLOGY | Facility: CLINIC | Age: 27
End: 2022-10-28

## 2022-10-28 NOTE — TELEPHONE ENCOUNTER
----- Message from Mary Suero MD sent at 10/26/2022  2:32 PM EDT -----  Patient should be on lacosamide 150mg twice a day and oxcarbazepine 450mg twice a day  The recent dose changes are reflected in higher level of lacosamide and lower level of oxcarbazepine  She has a follow-up appointment on 11/8/2022 with Luis Enrique Lamb  We can make medication changes at the follow-up visit:  If there have been no seizures, then maximize lacosamide to 200mg twice a day and decrease oxcarbazepine to 300mg twice a day     Again we don't have to make immediate changes right now; unless she is experiencing side effects (double vision, difficulty with balance, tremors)

## 2022-10-28 NOTE — TELEPHONE ENCOUNTER
Message left for patient requesting a call back if she is experiencing symptoms, otherwise advised medication changes/recommendations will be discussed at upcoming appointment

## 2022-11-07 ENCOUNTER — TELEPHONE (OUTPATIENT)
Dept: NEUROLOGY | Facility: CLINIC | Age: 27
End: 2022-11-07
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
## 2022-11-08 ENCOUNTER — OFFICE VISIT (OUTPATIENT)
Dept: NEUROLOGY | Facility: CLINIC | Age: 27
End: 2022-11-08

## 2022-11-08 VITALS
HEART RATE: 71 BPM | SYSTOLIC BLOOD PRESSURE: 131 MMHG | DIASTOLIC BLOOD PRESSURE: 92 MMHG | TEMPERATURE: 97.7 F | HEIGHT: 62 IN | BODY MASS INDEX: 21.35 KG/M2 | WEIGHT: 116 LBS

## 2022-11-08 DIAGNOSIS — F41.9 ANXIETY: ICD-10-CM

## 2022-11-08 DIAGNOSIS — G40.209 LOCALIZATION-RELATED FOCAL EPILEPSY WITH COMPLEX PARTIAL SEIZURES (HCC): Primary | ICD-10-CM

## 2022-11-08 DIAGNOSIS — F33.1 MODERATE EPISODE OF RECURRENT MAJOR DEPRESSIVE DISORDER (HCC): ICD-10-CM

## 2022-11-08 RX ORDER — LACOSAMIDE 200 MG/1
TABLET ORAL
Qty: 60 TABLET | Refills: 5 | Status: SHIPPED | OUTPATIENT
Start: 2022-11-08

## 2022-11-08 RX ORDER — OXCARBAZEPINE 300 MG/1
TABLET, FILM COATED ORAL
Qty: 180 TABLET | Refills: 1
Start: 2022-11-08

## 2022-11-08 NOTE — PATIENT INSTRUCTIONS
Plan:   1 - decrease oxcarbazepine to 300mg tab take one tab twice a day  2 - increase Lacosamide (Vimpat) to 200mg take one tab twice a day  3 - continue with Venlafaxine ER 75mg and 37 5mg one tab each daily  4 - in about 2 months go for blood work to check lacosamide and oxcarbazepine level  5 - call the office if you experience recurrence of visual seizure, if you are having issues with medication side effects, worsening depression and anxiety symptoms  6 - try to not drive for about 30 days when you reduce your oxcarbazepine dose, avoid ladders if possible    Jacobo the office if you have episodes of loss of consciousness, confusional spells, or periods of abnormal behaviors  7 - suggest establishing care with a psychologist as discussed  8 - follow up in 3 months with me and 6 months with Dr Demetrio yVas

## 2022-11-08 NOTE — PROGRESS NOTES
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) and co-morbid depression and generalized anxiety disorders  While on oxcarbazepine, she did not have bilateral tonic clonic seizures, but continued to have episodic visual sensory seizures  With the addition of lacosamide, she has not had recurrence of her focal sensory seizures  At timing of last visit, transitioning from dual therapy to monotherapy with lacosamide was discussed  She has done well with the increase of lacosamide and decrease of oxcarbazepine with no breakthrough seizures or additional side effects reported  We will again increase lacosamide and decrease oxcarbazepine today  Prior to next visit, she will have updated labs to see if we need to increase lacosamide further as we continue to wean her off oxcarbazepine  Headaches continue to be well controlled and less prevalent, usually just occurring around the time of her period and controlled with acetaminophen  She should continue with venlafaxine 112 5mg daily as it has helped her mood and anxiety symptoms  We also again discussed seeing a therapist to discuss her ongoing issues with food/eating disorder  Plan:  1 - decrease oxcarbazepine to 300mg tab take one tab twice a day  2 - increase Lacosamide (Vimpat) to 200mg take one tab twice a day  3 - continue with Venlafaxine ER 75mg and 37 5mg one tab each daily  4 - in about 2 months go for blood work to check lacosamide and oxcarbazepine level  5 - call the office if you experience recurrence of visual seizure, if you are having issues with medication side effects, worsening depression and anxiety symptoms  6 - try to not drive for about 30 days when you reduce your oxcarbazepine dose, avoid ladders if possible    Call the office if you have episodes of loss of consciousness, confusional spells, or periods of abnormal behaviors  7 - suggest establishing care with a psychologist to continue to address depression, anxiety and eating disorder  8 - follow up in 3 months with me and 6 months with Dr Mesfin Howard          Diagnoses and all orders for this visit:    Localization-related focal epilepsy with complex partial seizures ([address] )  -     Lacosamide; Future  -     Oxcarbazepine level; Future  -     OXcarbazepine (TRILEPTAL) 300 mg tablet; Take 1 po BID  -     lacosamide (VIMPAT) 200 mg tablet; Take 1 po BID    Moderate episode of recurrent major depressive disorder (HCC)    Anxiety           Subjective:    EVERT Wang is a 32 y o  right handed female here for follow-up evaluation of focal seizures, visual distortions with headache and difficulty dealing with her emotions  Interval history 11/8/2022  At timing of last visit, decision was made to try and transition from 2 AEDs to 1 (lacosamide monotherapy)  She increased lacosamide and decreased oxcarbazepine  No breakthrough seizures  No side effects with the higher dose of lacosamide  She reports she was under a great deal of stress, lost 2 friends/members of her [employer]  She admits her depression increased for a while and she actually stopped all of her medications for a few days, then realized she should not have done that, and restarted everything  She continues to struggle with her eating issues  She knows she should see a therapist, but has not been back to see one in a few years  Aside from those few days of increased depression and her eating issues, she feels her mood has been ok, continues on venlafaxine  Denies increased headaches, they happen occasionally, usually around the time of her period  AED/side effects/compliance:  Oxcarbazepine 450-450  Lacosamide 150-150  No reported side effects    Event/Seizure semiology:  1   Lightheaded blurry vision, loss of consciousness, convulsion (Last one 8/24/2016)  2  Blurry vision, circles in a part of her visual field (left side), followed by a period of headache, no clear confusion or disorientation (described in January 2017 visit) - recurs 2-3 times a month     Women of childbearing age:  Contraception: not sexual activity  Folic acid: no - does not want at this point     Prior Epilepsy History:  Initial intake epilepsy history 9/12/2016  Review of hospital records  Ms Lorean Libman was admitted from 8/24-8/26/2016 to [facility] after a witnessed seizure  She had an MRI brain study that showed cortical malformations including heterotopias, she was subsequently started on levetiracetam 500mg twice a day  Dr Hyacinth Rinne did the initial consultation; she reported that her first seizure was in June 2016, onset of lightheadedness, blurry vision, no memory of passing out but she collapsed, the next thing she remembered was waking up in the ambulance  On the day of admission she felt lightheaded, blurry vision, veering to the sides of the aisle and collapsed, her mother witnessed some of the event but only after she fell; patient’s eyes were open and mouth was moving, arms were tonically flexed and asymmetric and legs were hypertonic with repeated relaxation and tonic stiffening  Again she did not regain consciousness until she was in the ambulance  She had a history of one febrile seizure, a paternal great grandmother with seizures, head CT scan found ventriculomegaly  Patient’s history  June 2016, Ms Cunningham recalled that she was at work and the next thing she remembered was people standing over her, she did not remember what happened before other than feeling lightheaded and blurry vision  She denied feeling exhausted, new medications, drugs, illness/fevers, and there were no changes in her usual habits  August 2016, again she does not recall much other than being told she had a seizure for less minute   Her mother reported that she did see the end of the seizure; initially Ms Faustino Reza had her the arms and legs extended outward, then arms were flexed inward, convulsion lasted about 30 minute, and she was unresponsive for about 5 minutes but she was lethargic for about 15-20 minutes afterwards  Her mother had briefly turned away and did not witness the very start of the seizure  Again, there were no alcohol, drugs, medications, or illness experienced by Ms Cunningham  She denies any history of myoclonus, staring spells, automatisms, unexplained hyperkinetic behaviors, olfactory / gustatory hallucinations, epigastric rising events, boogie vu events, visual hallucinations, unexplained nocturnal enuresis, or nocturnal tongue biting  At 17 months, she had a cold with a super high temperature, she had a seizure, deemed to have been a simple febrile seizure  LEV was associated with daily headaches that come and go throughout the day may last for a couple of hours at a time  These are very severe because she does not do well with pain  Headache is best describe as a throbbing sensation for a couple of hours, daily, she does not take any medication for pain because she is not sure if she can  There is no visual changes, no loss of vision, or changes in refraction  Summary 2017  Switched LVT to [facility], better tolerated, over the course of 2017, OXC was escalated for recurrent visual distortions of the left side, circles, blurry vision (sensation of visual obscuration lasting for 2 minutes, left side swirls, if severe enough that she would temporarily not see clearly on the left visual field), lasting minutes without altered awareness or loss of consciousness  There is a headache that follows for about half an hour  These visual distortions have been sporadic once 1-5 months  We had tried gabapentin in the Spring/summer 2017 but she stopped taking it because of side effects / headaches (horrible, feeling like everything was in slow motion)   Sometimes these visual distortions are triggered by stress at work  She works part time as a  4-5 hours  I wrote a letter on her behalf to get a 10-15 minutes break every 2 hours  She reports issues with anxiety, mostly as it involves the possibility of a recurrent seizure  She feels like she has been looking over her shoulder because of her epilepsy  She tried Web[employer] but found it useless  She did get her 's license back with [employer] report of occasional visual seizures  Summary 2018  Started with -1200  She continues to have focal visual seizures, visual disturbances of circles on the left visual field followed by a headache, no disorientation or confusion; episodic about 1-2 times a month  We had tried higher dose of OXC but she was having balance and cognitive issues  We tried adding Lyrica, but no improvement, weaned off and started LMG  She is driving but she has avoided the highways due to her seizures  She has enough of a warning to stop the car or pull over on local streets  Struggles with depression and anxiety  Summary 2019  -600, -200  There was no improvement with the addition of lamotrigine to the prevention of visual auras, which continue to be infrequent events (presumed focal visual seizures)  Combination of OXC and LMG caused double vision, along with headaches, nausea  Due to headaches we started her on zonisamide and took her off of lamotrigine  Once off lamotrigine, headaches resolved  She struggles with her emotions including cutting behaviors, anxiety, and depression; however, she refuses to accept referral to seeing a behavioral health specialist  She has stress from 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, she likes whom she works with but not who she works for) and family members (who don't get her)  Summary 2020  -600,   Weaned off of lamotrigine due to recurrent headaches  Trial of zonisamide caused weight loss despite good appetite   She did not have recurrent visual seizure until zonisamide was stopped  She was getting therapy at [facility]); not sure if it helped with depression and anxiety (she reported it was getting worse midway through the year)  We increased Effexor include 37 5mg daily  By the end of the year, she was started on Vimpat  Summary   -600, -100  Discontinued zonisamide due to persistent weight loss  Since being on lacosamide, she has not had a focal sensory / visual seizure  She has been taking magnesium supplements  Since being on Effexor her mood has been better  Interval history 2022  She feels good  She has not had a seizure   She is happy because she has been able to gain two pounds  She feels that she has no appetite but she has a friend who helps her push to eat  She has not had recurrence of eye lid twitching, but she feels that the magnesium was triggering  She is on gummy multivitamins and iron supplementation  She was able to get her COVID19 booster  She is hoping to try to reduce her medication load  She has been having headaches but not as much, about once a week, but no nausea  She usually takes an acetaminophen with good relief  She has had some bad news regarding friends from [employer], one has dementia and the other has kidney failure  She has been participating in her [employer] more to keep her mood up and keep herself busy  She is not planning on having children in the near future       Special Features  Status epilepticus: No  Self Injury Seizures: No  Precipitating Factors: None     Epilepsy Risk Factors:  Abnormal pregnancy: No  Abnormal birth/: No  Abnormal Development: walked after a year, slower with language  Febrile seizures, simple: Yes  Febrile seizures, complex: No  CNS infection: No  Mental retardation: No  Cerebral palsy: No  Head injury (moderate/severe): No  CNS neoplasm: No  CNS malformation: Yes - MRI brain found ventriculomegaly, abnormal splenium of corpus callosum, and subependymal heterotopia  Neurosurgical procedure: No  Stroke: No  Alcohol abuse: No  Drug abuse: No  Family history Sz/epilepsy: No     Prior AEDs:  Levetiracetam (headaches), oxcarbazepine (continued to have visual aura), gabapentin (side effect), pregabalin (continues to have visual auras and very emotional), lamotrigine (caused more headaches), zonisamide (complaints of weight loss), lacosamide     Prior workup:  [id number]   MRI brain  Bilateral subependymal heterotopias in the region of the atria of the lateral ventricles with bilateral colpocephaly  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  Solitary nonspecific FLAIR hyperintensity subcortical white matter right frontal lobe     2017  MRI brain  Stable colpocephalic appearance to the ventricles, right greater than left, bowing / stretching of the posterior corpus callosum  Subtle foci nodular heterotopia along atria of both lateral ventricles more apparent on the right  Tiny focus of increased signal in the white matter of the right frontal horn is less apparent  Hippocampi are somewhat dysmorphic bilaterally     2019  MRI alonso w/wo  Single white matter hyperintensity in the right anterior superior frontal lobe (unchanged)  Stable colpocephaly of the lateral ventricles, right greater than left  Symmetric hippocampal formations  No heterotopia was identified     EEGs:  2016  Intermittent focal slowing over the right posterior temporal region but limited to the T6 electrode, consider repeating the study     2016  Photic driving is lateralized to the left; thus, possibly relative neuronal dysfunction in the right posterior region  2018  Frequent, right posterior temporal delta slowing and occipital sharp activity during 10 Hz photic stimulation      Labs:  10/21/22  Component Value Flag Ref Range Units Status   Lacosamide 6 2    mcg/mL Final     Ref Range Units Status 10-Hydroxycarbazepine 9 5   8 0 - 35 0 mcg/mL Final         The following portions of the patient's history were reviewed and updated as appropriate: current medications, past family history, past medical history, past social history, past surgical history and problem list          Objective:    Blood pressure 131/92, pulse 71, temperature 97 7 °F (36 5 °C), temperature source Tympanic, height 5' 2" (1 575 m), weight 52 6 kg (116 lb)  Physical Exam  Constitutional:       Appearance: Normal appearance  HENT:      Head: Normocephalic and atraumatic  Eyes:      Extraocular Movements: EOM normal       Pupils: Pupils are equal, round, and reactive to light  Neurological:      Mental Status: She is alert  Deep Tendon Reflexes: Strength normal and reflexes are normal and symmetric  Psychiatric:         Mood and Affect: Mood normal          Speech: Speech normal          Behavior: Behavior normal          Neurological Exam  Mental Status  Alert  Oriented to person, place, time and situation  Speech is normal  Language is fluent with no aphasia  Attention and concentration are normal     Cranial Nerves  CN II: Visual fields full to confrontation  CN III, IV, VI: Extraocular movements intact bilaterally  Pupils equal round and reactive to light bilaterally  CN V: Facial sensation is normal   CN VII: Full and symmetric facial movement  CN VIII: Hearing is normal   CN IX, X: Palate elevates symmetrically  CN XI: Shoulder shrug strength is normal   CN XII: Tongue midline without atrophy or fasciculations  Motor   Normal muscle tone  Strength is 5/5 throughout all four extremities  Sensory  Light touch is normal in upper and lower extremities  Reflexes  Deep tendon reflexes are 2+ and symmetric in all four extremities  Coordination  Right: Finger-to-nose normal Left: Finger-to-nose normal     Gait  Casual gait is normal including stance, stride, and arm swing        Past Psychiatric History:  Depression: Yes  Anxiety: Yes  Psychosis: No    Social History  Living situation:  lives with parents  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, works in retail; 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[id number]); sometimes she needs to use a ladder  Driving:  active license   reports that she has never smoked  She has never used smokeless tobacco  She reports current alcohol use  She reports that she does not use drugs  ROS:    Review of Systems   Constitutional: Negative for chills and fever  HENT: Negative for ear pain and sore throat  Eyes: Negative for pain and visual disturbance  Respiratory: Negative for cough and shortness of breath  Cardiovascular: Negative for chest pain and palpitations  Gastrointestinal: Positive for nausea  Negative for abdominal pain, rectal pain and vomiting  Genitourinary: Negative for dysuria and hematuria  Musculoskeletal: Negative for arthralgias and back pain  Skin: Negative for color change and rash  Neurological: Positive for headaches (menstural related)  Negative for seizures and syncope  All other systems reviewed and are negative      I personally reviewed and updated the ROS as appropriate

## 2023-01-25 ENCOUNTER — TELEPHONE (OUTPATIENT)
Dept: NEUROLOGY | Facility: CLINIC | Age: 28
End: 2023-01-25

## 2023-01-25 NOTE — TELEPHONE ENCOUNTER
LMOM to remind pt of upcoming appt on 02/08/23  left the office number for any questions or concerns

## 2023-01-31 LAB
10OH-CARBAZEPINE SERPL-MCNC: 3.1 MCG/ML (ref 8–35)
LACOSAMIDE SERPL-MCNC: 3.7 MCG/ML

## 2023-02-02 ENCOUNTER — TELEPHONE (OUTPATIENT)
Dept: NEUROLOGY | Facility: CLINIC | Age: 28
End: 2023-02-02

## 2023-02-02 NOTE — TELEPHONE ENCOUNTER
----- Message from Anjana Warren PA-C sent at 2/1/2023  8:09 AM EST -----  Please ensure she is taking her lacosamide and oxcarbazepine regularly  At last visit, we DECREASED the oxcarbazepine (we are attempting to wean off it) and INCREASED her lacosamide  Her oxcarbazepine level is "low", which is expected, but her lacosamide level is actually lower than it was before we increased the dose      Should be on:  oxcarbazepine 300mg tab take one tab twice a day  lacosamide 200mg take one tab twice a day

## 2023-02-05 DIAGNOSIS — G40.209 LOCALIZATION-RELATED FOCAL EPILEPSY WITH COMPLEX PARTIAL SEIZURES (HCC): ICD-10-CM

## 2023-02-06 RX ORDER — OXCARBAZEPINE 300 MG/1
300 TABLET, FILM COATED ORAL EVERY 12 HOURS SCHEDULED
Qty: 180 TABLET | Refills: 1 | Status: SHIPPED | OUTPATIENT
Start: 2023-02-06 | End: 2023-02-08 | Stop reason: SDUPTHER

## 2023-02-08 ENCOUNTER — OFFICE VISIT (OUTPATIENT)
Dept: NEUROLOGY | Facility: CLINIC | Age: 28
End: 2023-02-08

## 2023-02-08 VITALS
RESPIRATION RATE: 18 BRPM | SYSTOLIC BLOOD PRESSURE: 132 MMHG | HEIGHT: 62 IN | TEMPERATURE: 96.7 F | WEIGHT: 116.8 LBS | HEART RATE: 65 BPM | DIASTOLIC BLOOD PRESSURE: 81 MMHG | BODY MASS INDEX: 21.49 KG/M2

## 2023-02-08 DIAGNOSIS — R11.0 NAUSEA: ICD-10-CM

## 2023-02-08 DIAGNOSIS — F33.1 MODERATE EPISODE OF RECURRENT MAJOR DEPRESSIVE DISORDER (HCC): ICD-10-CM

## 2023-02-08 DIAGNOSIS — G40.209 LOCALIZATION-RELATED FOCAL EPILEPSY WITH COMPLEX PARTIAL SEIZURES (HCC): Primary | ICD-10-CM

## 2023-02-08 RX ORDER — OXCARBAZEPINE 300 MG/1
150 TABLET, FILM COATED ORAL EVERY 12 HOURS SCHEDULED
Qty: 180 TABLET | Refills: 1
Start: 2023-02-08

## 2023-02-08 RX ORDER — ONDANSETRON 4 MG/1
TABLET, FILM COATED ORAL
Qty: 20 TABLET | Refills: 0 | Status: SHIPPED | OUTPATIENT
Start: 2023-02-08

## 2023-02-08 NOTE — PROGRESS NOTES
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) and co-morbid depression and generalized anxiety disorders  While on oxcarbazepine, she did not have bilateral tonic clonic seizures, but continued to have episodic visual sensory seizures  With the addition of lacosamide, she has not had recurrence of her focal sensory seizures  We are currently transitioning from dual therapy to monotherapy with lacosamide  She has done well with the increase of lacosamide and decrease of oxcarbazepine with no breakthrough seizures or additional side effects reported  Her recent lacosamide level was lower than what it was before, despite the increased dose  She admits to occasionally forgetting her AM dose of medications  We extensively discussed the importance of medication compliance, especially since we are transitioning to monotherapy  We discussed ways to help her remember the AM dose, including taking it at home before she goes to work (as opposed to while at work and busy), as well as setting an alarm on her phone specifically to remind her to take her medications  She will do both of these  I will keep her lacosamide dose the same and decrease oxcarbazepine again today  Prior to next visit, will get an updated lacosamide level  Headaches continue to be well controlled and less prevalent, usually just occurring around the time of her period and controlled with acetaminophen  She should continue with venlafaxine 112 5mg daily as it has helped her mood and anxiety symptoms  We also again discussed seeing a therapist to discuss her ongoing issues with food/eating disorder  She does endorse eating better and maintaining her weight         Plan:  1 - decrease oxcarbazepine to 150mg twice a day (cut the 300mg tabs in half and take 1/2 tablet twice a day)  2 - continue Lacosamide (Vimpat) 200mg take one tab twice a day  Make sure you are taking this regularly and not missing doses  3 - continue with Venlafaxine ER 75mg and 37 5mg one tab each daily  4 - in about 2 months go for blood work to check lacosamide level  5 - call the office if you experience recurrence of visual seizure, if you are having issues with medication side effects, worsening depression and anxiety symptoms  6 - try to not drive for about 30 days when you reduce your oxcarbazepine dose, avoid ladders if possible  Call the office if you have episodes of loss of consciousness, confusional spells, or periods of abnormal behaviors  7 - suggest establishing care with a psychologist to continue to address depression, anxiety and eating disorder  8 - follow up in 3 months with Dr Isaias Atwood as scheduled        Diagnoses and all orders for this visit:    Localization-related focal epilepsy with complex partial seizures ([address] )  -     OXcarbazepine (TRILEPTAL) 300 mg tablet; Take 0 5 tablets (150 mg total) by mouth every 12 (twelve) hours  -     Lacosamide; Future    Moderate episode of recurrent major depressive disorder (HCC)    Nausea  -     ondansetron (ZOFRAN) 4 mg tablet; Take 1 po q8hrs PRN nausea and vomiting           Subjective:    EVERT    Vladimir Guadalupe is a 32 y o  right handed female here for follow-up evaluation of focal seizures, visual distortions with headache and difficulty dealing with her emotions  Interval history 2/8/2023  She has done well with the reduction of oxcarbazepine and increase of lacosamide  She did just have AED levels drawn and lacosamide level was actually lower than her prior level, despite increasing the dose  She admits that she occasionally misses her AM dose of medication  She says she usually takes the med after she gets to work in the AM, and sometimes forgets  She cannot tell me how many times she forgets    She is considering trying to take the medication before leaving for work  She has not had any seizures with changing AED doses  No side effects with higher dose of lacosamide  Headaches have been stable, no complaints today  Her mood has been a bit “depressed”, says it is because her friend who is a “snow bird” is currently in [place]  However she reports many good things going on in her life and seems very upbeat about these things  She notes since Christmas she has been eating more  She got to see family and had a lot of good food over the [place] and has just continued eating more  She is sleeping well at night  Still has not met with a therapist, but says she still considers it  AED/side effects/compliance:  Oxcarbazepine 300-300  Lacosamide 200-200  No reported side effects     Event/Seizure semiology:  1  Lightheaded blurry vision, loss of consciousness, convulsion (Last one 8/24/2016)  2  Blurry vision, circles in a part of her visual field (left side), followed by a period of headache, no clear confusion or disorientation (described in January 2017 visit) - recurs 2-3 times a month     Women of childbearing age:  Contraception: not sexual activity  Folic acid: no - does not want at this point     Prior Epilepsy History:  Initial intake epilepsy history 9/12/2016  Review of hospital records  Ms Greg Robbins was admitted from 8/24-8/26/2016 to [facility] after a witnessed seizure  She had an MRI brain study that showed cortical malformations including heterotopias, she was subsequently started on levetiracetam 500mg twice a day  Dr Agnes Costa did the initial consultation; she reported that her first seizure was in June 2016, onset of lightheadedness, blurry vision, no memory of passing out but she collapsed, the next thing she remembered was waking up in the ambulance   On the day of admission she felt lightheaded, blurry vision, veering to the sides of the aisle and collapsed, her mother witnessed some of the event but only after she fell; patient’s eyes were open and mouth was moving, arms were tonically flexed and asymmetric and legs were hypertonic with repeated relaxation and tonic stiffening  Again she did not regain consciousness until she was in the ambulance  She had a history of one febrile seizure, a paternal great grandmother with seizures, head CT scan found ventriculomegaly  Patient’s history  June 2016, Ms Cunningham recalled that she was at work and the next thing she remembered was people standing over her, she did not remember what happened before other than feeling lightheaded and blurry vision  She denied feeling exhausted, new medications, drugs, illness/fevers, and there were no changes in her usual habits  August 2016, again she does not recall much other than being told she had a seizure for less minute  Her mother reported that she did see the end of the seizure; initially Ms Riccardo Watkins had her the arms and legs extended outward, then arms were flexed inward, convulsion lasted about 30 minute, and she was unresponsive for about 5 minutes but she was lethargic for about 15-20 minutes afterwards  Her mother had briefly turned away and did not witness the very start of the seizure  Again, there were no alcohol, drugs, medications, or illness experienced by Ms Cunningham  She denies any history of myoclonus, staring spells, automatisms, unexplained hyperkinetic behaviors, olfactory / gustatory hallucinations, epigastric rising events, boogie vu events, visual hallucinations, unexplained nocturnal enuresis, or nocturnal tongue biting  At 17 months, she had a cold with a super high temperature, she had a seizure, deemed to have been a simple febrile seizure  LEV was associated with daily headaches that come and go throughout the day may last for a couple of hours at a time  These are very severe because she does not do well with pain   Headache is best describe as a throbbing sensation for a couple of hours, daily, she does not take any medication for pain because she is not sure if she can  There is no visual changes, no loss of vision, or changes in refraction  Summary 2017  [facility] [address] [facility] [address], better tolerated, over the course of 2017, OXC was escalated for recurrent visual distortions of the left side, circles, blurry vision (sensation of visual obscuration lasting for 2 minutes, left side swirls, if severe enough that she would temporarily not see clearly on the left visual field), lasting minutes without altered awareness or loss of consciousness  There is a headache that follows for about half an hour  These visual distortions have been sporadic once 1-5 months  We had tried gabapentin in the Spring/summer 2017 but she stopped taking it because of side effects / headaches (horrible, feeling like everything was in slow motion)  Sometimes these visual distortions are triggered by stress at work  She works part time as a  4-5 hours  I wrote a letter on her behalf to get a 10-15 minutes break every 2 hours  She reports issues with anxiety, mostly as it involves the possibility of a recurrent seizure  She feels like she has been looking over her shoulder because of her epilepsy  She tried Web[employer] but found it useless  [facility]  Summary 2018  Started with -1200  She continues to have focal visual seizures, visual disturbances of circles on the left visual field followed by a headache, no disorientation or confusion; episodic about 1-2 times a month  We had tried higher dose of OXC but she was having balance and cognitive issues  We tried adding Lyrica, but no improvement, weaned off and started LMG  She is driving but she has avoided the highways due to her seizures  She has enough of a warning to stop the car or pull over on local streets  Struggles with depression and anxiety  Summary 2019  -600, -200   There was no improvement with the addition of lamotrigine to the prevention of visual auras, which continue to be infrequent events (presumed focal visual seizures)  Combination of OXC and LMG caused double vision, along with headaches, nausea  Due to headaches we started her on zonisamide and took her off of lamotrigine  Once off lamotrigine, headaches resolved  She struggles with her emotions including cutting behaviors, anxiety, and depression; however, she refuses to accept referral to seeing a behavioral health specialist  She has stress from 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, she likes whom she works with but not who she works for) and family members (who don't get her)  Summary 2020  -600,   Weaned off of lamotrigine due to recurrent headaches  Trial of zonisamide caused weight loss despite good appetite  She did not have recurrent visual seizure until zonisamide was stopped  She was getting therapy at [facility]); not sure if it helped with depression and anxiety (she reported it was getting worse midway through the year)  We increased Effexor include 37 5mg daily  By the end of the year, she was started on Vimpat  Summary 2021  -600, -100  Discontinued zonisamide due to persistent weight loss  Since being on lacosamide, she has not had a focal sensory / visual seizure  She has been taking magnesium supplements  Since being on Effexor her mood has been better  Interval history 8/8/2022  She feels good  She has not had a seizure   She is happy because she has been able to gain two pounds  She feels that she has no appetite but she has a friend who helps her push to eat  She has not had recurrence of eye lid twitching, but she feels that the magnesium was triggering  She is on gummy multivitamins and iron supplementation  She was able to get her COVID19 booster  She is hoping to try to reduce her medication load    She has been having headaches but not as much, about once a week, but no nausea  She usually takes an acetaminophen with good relief  She has had some bad news regarding friends from Hinduism, one has dementia and the other has kidney failure  She has been participating in her Hinduism more to keep her mood up and keep herself busy  She is not planning on having children in the near future  Interval history 2022  At timing of last visit, decision was made to try and transition from 2 AEDs to 1 (lacosamide monotherapy)  She increased lacosamide and decreased oxcarbazepine  No breakthrough seizures  No side effects with the higher dose of lacosamide  She reports she was under a great deal of stress, lost 2 friends/members of her Hinduism  She admits her depression increased for a while and she actually stopped all of her medications for a few days, then realized she should not have done that, and restarted everything  She continues to struggle with her eating issues  She knows she should see a therapist, but has not been back to see one in a few years  Aside from those few days of increased depression and her eating issues, she feels her mood has been ok, continues on venlafaxine  Denies increased headaches, they happen occasionally, usually around the time of her period       Special Features  Status epilepticus: No  Self Injury Seizures: No  Precipitating Factors: None     Epilepsy Risk Factors:  Abnormal pregnancy: No  Abnormal birth/: No  Abnormal Development: walked after a year, slower with language  Febrile seizures, simple: Yes  Febrile seizures, complex: No  CNS infection: No  Mental retardation: No  Cerebral palsy: No  Head injury (moderate/severe): No  CNS neoplasm: No  CNS malformation: Yes - MRI brain found ventriculomegaly, abnormal splenium of corpus callosum, and subependymal heterotopia  Neurosurgical procedure: No  Stroke: No  Alcohol abuse: No  Drug abuse: No  Family history Sz/epilepsy: No     Prior AEDs:  Levetiracetam (headaches), oxcarbazepine (continued to have visual aura), gabapentin (side effect), pregabalin (continues to have visual auras and very emotional), lamotrigine (caused more headaches), zonisamide (complaints of weight loss), lacosamide     Prior workup:  [id number]   MRI brain  Bilateral subependymal heterotopias in the region of the atria of the lateral ventricles with bilateral colpocephaly  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  Solitary nonspecific FLAIR hyperintensity subcortical white matter right frontal lobe     2017  MRI brain  Stable colpocephalic appearance to the ventricles, right greater than left, bowing / stretching of the posterior corpus callosum  Subtle foci nodular heterotopia along atria of both lateral ventricles more apparent on the right  Tiny focus of increased signal in the white matter of the right frontal horn is less apparent  Hippocampi are somewhat dysmorphic bilaterally     2019  MRI alonso w/wo  Single white matter hyperintensity in the right anterior superior frontal lobe (unchanged)  Stable colpocephaly of the lateral ventricles, right greater than left  Symmetric hippocampal formations  No heterotopia was identified     EEGs:  2016  Intermittent focal slowing over the right posterior temporal region but limited to the T6 electrode, consider repeating the study     2016  Photic driving is lateralized to the left; thus, possibly relative neuronal dysfunction in the right posterior region  2018  Frequent, right posterior temporal delta slowing and occipital sharp activity during 10 Hz photic stimulation       Labs:  2023  Component Value Flag Ref Range Units Status   Lacosamide 3 7    mcg/mL Final       Component Value Flag Ref Range Units Status   10-Hydroxycarbazepine 3 1   Low   8 0 - 35 0 mcg/mL Final         The following portions of the patient's history were reviewed and updated as appropriate: current medications, past family history, past medical history, past social history, past surgical history and problem list          Objective:    Blood pressure 132/81, pulse 65, temperature (!) 96 7 °F (35 9 °C), temperature source Tympanic, resp  rate 18, height 5' 2" (1 575 m), weight 53 kg (116 lb 12 8 oz)  Physical Exam  Constitutional:       Appearance: Normal appearance  HENT:      Head: Normocephalic and atraumatic  Eyes:      Extraocular Movements: EOM normal       Pupils: Pupils are equal, round, and reactive to light  Neurological:      Mental Status: She is alert  Motor: Motor strength is normal       Deep Tendon Reflexes: Reflexes are normal and symmetric  Psychiatric:         Mood and Affect: Mood normal          Speech: Speech normal          Behavior: Behavior normal          Neurological Exam  Mental Status  Alert  Oriented to person, place, time and situation  Speech is normal  Language is fluent with no aphasia  Attention and concentration are normal     Cranial Nerves  CN II: Visual fields full to confrontation  CN III, IV, VI: Extraocular movements intact bilaterally  Pupils equal round and reactive to light bilaterally  CN V: Facial sensation is normal   CN VII: Full and symmetric facial movement  CN VIII: Hearing is normal   CN IX, X: Palate elevates symmetrically  CN XI: Shoulder shrug strength is normal   CN XII: Tongue midline without atrophy or fasciculations  Motor   Normal muscle tone  Strength is 5/5 throughout all four extremities  Sensory  Light touch is normal in upper and lower extremities  Reflexes  Deep tendon reflexes are 2+ and symmetric in all four extremities  Coordination  Right: Finger-to-nose normal Left: Finger-to-nose normal     Gait  Casual gait is normal including stance, stride, and arm swing        Current Outpatient Medications   Medication Sig Dispense Refill   • lacosamide (VIMPAT) 200 mg tablet Take 1 po BID 60 tablet 5 • ondansetron (ZOFRAN) 4 mg tablet Take 1 po q8hrs PRN nausea and vomiting 20 tablet 0   • OXcarbazepine (TRILEPTAL) 300 mg tablet Take 0 5 tablets (150 mg total) by mouth every 12 (twelve) hours 180 tablet 1   • venlafaxine (EFFEXOR-XR) 37 5 mg 24 hr capsule Take 1 capsule (37 5 mg total) by mouth daily Take with one 75mg capsule 90 capsule 3   • venlafaxine (EFFEXOR-XR) 75 mg 24 hr capsule Take 1 capsule (75 mg total) by mouth daily With one 37 5mg cap 90 capsule 3     No current facility-administered medications for this visit  No Known Allergies     Past Medical History:   Diagnosis Date   • Cerebral ventriculomegaly 8/25/2016   • Seizures ([facility] [address] )    • Self-injurious behavior 9/9/2019    Cutting behavior   • Syncopal episodes       History reviewed  No pertinent surgical history  Family History   Problem Relation Age of Onset   • No Known Problems Mother    • No Known Problems Father    • No Known Problems Brother       Past Psychiatric History:  Depression: Yes  Anxiety: Yes  Psychosis: No     Social History  Living situation: [place] with parents  Work:  completed 12th grade, works in retail; 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[address] [place] [address] and [address] [employer]); sometimes she needs to use a ladder  Driving: [place] that she has never smoked  She has never used smokeless tobacco  She reports current alcohol use  She reports that she does not use drugs      ROS:    Review of Systems   Constitutional: Negative for chills and fever  HENT: Negative for ear pain and sore throat  Eyes: Negative for pain and visual disturbance  Respiratory: Negative for cough and shortness of breath  Cardiovascular: Negative for chest pain and palpitations  Gastrointestinal: Positive for nausea  Negative for abdominal pain and vomiting  Genitourinary: Negative for dysuria and hematuria  Musculoskeletal: Negative for arthralgias and back pain  Skin: Negative for color change and rash  Neurological: Negative for seizures and syncope  All other systems reviewed and are negative      I personally reviewed and updated the ROS as appropriate

## 2023-02-08 NOTE — PATIENT INSTRUCTIONS
Plan:  1 - decrease oxcarbazepine to 150mg twice a day (cut the 300mg tabs in half and take 1/2 tablet twice a day)  2 - continue Lacosamide (Vimpat) 200mg take one tab twice a day  Make sure you are taking this regularly and not missing doses  3 - continue with Venlafaxine ER 75mg and 37 5mg one tab each daily  4 - in about 2 months go for blood work to check lacosamide level  5 - call the office if you experience recurrence of visual seizure, if you are having issues with medication side effects, worsening depression and anxiety symptoms  6 - try to not drive for about 30 days when you reduce your oxcarbazepine dose, avoid ladders if possible    Call the office if you have episodes of loss of consciousness, confusional spells, or periods of abnormal behaviors  7 - suggest establishing care with a psychologist to continue to address depression, anxiety and eating disorder  8 - follow up in 3 months with me and 6 months with Dr Mei Parsons

## 2023-04-27 ENCOUNTER — TELEPHONE (OUTPATIENT)
Dept: NEUROLOGY | Facility: CLINIC | Age: 28
End: 2023-04-27

## 2023-04-27 NOTE — TELEPHONE ENCOUNTER
I called and left a voicemail message reminding the patient of there upcoming appointment with Dr Aquino on 5/12/23 @ 8:30 am  I also reminded her to complete her labs  I requested a call back to our office to confirm the appointment or if the patient has any issues or concerns or cannot keep this appointment   I sent out an appt card with the doctors name , date, time and location of appt

## 2023-05-01 LAB — LACOSAMIDE SERPL-MCNC: 8.8 MCG/ML

## 2023-05-12 ENCOUNTER — OFFICE VISIT (OUTPATIENT)
Dept: NEUROLOGY | Facility: CLINIC | Age: 28
End: 2023-05-12

## 2023-05-12 VITALS
DIASTOLIC BLOOD PRESSURE: 79 MMHG | TEMPERATURE: 97.7 F | HEIGHT: 63 IN | OXYGEN SATURATION: 100 % | WEIGHT: 119.8 LBS | HEART RATE: 76 BPM | BODY MASS INDEX: 21.23 KG/M2 | SYSTOLIC BLOOD PRESSURE: 128 MMHG | RESPIRATION RATE: 18 BRPM

## 2023-05-12 DIAGNOSIS — G40.209 LOCALIZATION-RELATED FOCAL EPILEPSY WITH COMPLEX PARTIAL SEIZURES (HCC): Primary | ICD-10-CM

## 2023-05-12 DIAGNOSIS — F33.1 MODERATE EPISODE OF RECURRENT MAJOR DEPRESSIVE DISORDER (HCC): ICD-10-CM

## 2023-05-12 PROBLEM — R51.9 HEADACHE, UNSPECIFIED HEADACHE TYPE: Status: RESOLVED | Noted: 2018-03-20 | Resolved: 2023-05-12

## 2023-05-12 RX ORDER — LACOSAMIDE 200 MG/1
200 TABLET ORAL EVERY 12 HOURS SCHEDULED
Qty: 180 TABLET | Refills: 3 | Status: SHIPPED | OUTPATIENT
Start: 2023-05-12

## 2023-05-12 RX ORDER — VENLAFAXINE HYDROCHLORIDE 75 MG/1
75 CAPSULE, EXTENDED RELEASE ORAL DAILY
Qty: 90 CAPSULE | Refills: 3 | Status: SHIPPED | OUTPATIENT
Start: 2023-05-12

## 2023-05-12 RX ORDER — VENLAFAXINE HYDROCHLORIDE 37.5 MG/1
37.5 CAPSULE, EXTENDED RELEASE ORAL DAILY
Qty: 90 CAPSULE | Refills: 3 | Status: SHIPPED | OUTPATIENT
Start: 2023-05-12

## 2023-05-12 NOTE — PROGRESS NOTES
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 and Major depression disorder with generalized anxiety  She has done well since staring lacosamide without recurrence of her visual auras  We have been reducing her dose of oxcarbazepine, which seems to have been helpful with regards to her mood and anxiety symptoms  She feels better on lower dose of oxcarbazepine  Her outlook regarding her epilepsy and general health has improved over the past few months  It is very reasonable to discontinue oxcarbazepine and keep her on lacosamide monotherapy  Her headaches have also significantly improved  She will continue with current dose of venlafaxine for her major depression disorder  Overall, she is doing much better with current medications  Plan:   1 - continue with lacosamide 200mg twice a day  2 - may discontinue oxcarbazepine  3 - continue with Venlafaxine ER 75mg and 37 5mg one tab each daily  4 - follow-up with advanced practitioner 6 months   5 - check BMP and lacosamide level a week prior to next visit  6 - call the office if you experience recurrence of visual seizure, if you are having issues with medication side effects, worsening depression and anxiety symptoms       Problem List Items Addressed This Visit        Nervous and Auditory    Localization-related focal epilepsy with complex partial seizures ([address] ) - Primary    Relevant Medications    lacosamide (VIMPAT) 200 mg tablet    Other Relevant Orders    Lacosamide    Basic metabolic panel       Other    Moderate episode of recurrent major depressive disorder (HCC)    Relevant Medications    venlafaxine (EFFEXOR-XR) 75 mg 24 hr capsule    venlafaxine (EFFEXOR-XR) 37 5 mg 24 hr capsule Chief Complaint:    Chief Complaint    Seizures       HPI:    Geena Merino is a 32 y o  right handed female here for follow-up evaluation of focal seizures, visual distortions with headache and difficulty dealing with her emotions  Interval history 5/12/2023  She was last seen by Kay Lopez on 2/8/2023  No seizures reported at that visit  She continued to have episodes of depression  Started to wean off of oxcarbazepine  She is in a good mood today  She feels better now that she has significantly reduced her dose of oxcarbazepine  She reports no side effects on the higher doses of oxcarbazepine, no difficulty with balance, double vision, or upset stomach  There have been no recurrent seizures, visual auras, altered awareness  She is better than usual   Mood is better  Her friend has returned from   She is hardly taking Zofran  She has headaches around the time of her menstrual cycle, so it is not unusual for her to have headaches  AED/side effects/compliance:  Oxcarbazepine 150-150  Lacosamide 200-200    Event/Seizure semiology:  1  Lightheaded blurry vision, loss of consciousness, convulsion  (Last one 8/24/2016)  2  Blurry vision, circles in a part of her visual field (left side), followed by a period of headache, no clear confusion or disorientation (described in January 2017 visit) - recurs 2-3 times a month    Women of childbearing age:  Contraception: not sexual activity  Folic acid: no - does not want at this point    Prior Epilepsy History:  Initial intake epilepsy history 9/12/2016  Review of hospital records  Ms Humza Hercules was admitted from 8/24-8/26/2016 to [facility] after a witnessed seizure  She had an MRI brain study that showed cortical malformations including heterotopias, she was subsequently started on levetiracetam 500mg twice a day    Dr Juliet Bolivar did the initial consultation; she reported that her first seizure was in June 2016, onset of lightheadedness, blurry vision, no memory of passing out but she collapsed, the next thing she remembered was waking up in the ambulance  On the day of admission she felt lightheaded, blurry vision, veering to the sides of the aisle and collapsed, her mother witnessed some of the event but only after she fell; patient’s eyes were open and mouth was moving, arms were tonically flexed and asymmetric and legs were hypertonic with repeated relaxation and tonic stiffening  Again she did not regain consciousness until she was in the ambulance  She had a history of one febrile seizure, a paternal great grandmother with seizures, head CT scan found ventriculomegaly  Patient’s history  June 2016, Ms Cunningham recalled that she was at work and the next thing she remembered was people standing over her, she did not remember what happened before other than feeling lightheaded and blurry vision  She denied feeling exhausted, new medications, drugs, illness/fevers, and there were no changes in her usual habits  August 2016, again she does not recall much other than being told she had a seizure for less minute  Her mother reported that she did see the end of the seizure; initially Ms Favio Vieyra had her the arms and legs extended outward, then arms were flexed inward, convulsion lasted about 30 minute, and she was unresponsive for about 5 minutes but she was lethargic for about 15-20 minutes afterwards  Her mother had briefly turned away and did not witness the very start of the seizure  Again, there were no alcohol, drugs, medications, or illness experienced by Ms Cunningham  She denies any history of myoclonus, staring spells, automatisms, unexplained hyperkinetic behaviors, olfactory / gustatory hallucinations, epigastric rising events, boogie vu events, visual hallucinations, unexplained nocturnal enuresis, or nocturnal tongue biting    At 17 months, she had a cold with a super high temperature, she had a seizure, deemed to have been a simple febrile seizure  LEV was associated with daily headaches that come and go throughout the day may last for a couple of hours at a time  These are very severe because she does not do well with pain  Headache is best describe as a throbbing sensation for a couple of hours, daily, she does not take any medication for pain because she is not sure if she can  There is no visual changes, no loss of vision, or changes in refraction  Summary 2017  Switched LVT to [address] [facility], better tolerated, over the course of 2017, OXC was escalated for recurrent visual distortions of the left side, circles, blurry vision (sensation of visual obscuration lasting for 2 minutes, left side swirls, if severe enough that she would temporarily not see clearly on the left visual field), lasting minutes without altered awareness or loss of consciousness  There is a headache that follows for about half an hour  These visual distortions have been sporadic once 1-5 months  We had tried gabapentin in the Spring/summer 2017 but she stopped taking it because of side effects / headaches (horrible, feeling like everything was in slow motion)  Sometimes these visual distortions are triggered by stress at work  She works part time as a  4-5 hours  I wrote a letter on her behalf to get a 10-15 minutes break every 2 hours  She reports issues with anxiety, mostly as it involves the possibility of a recurrent seizure  She feels like she has been looking over her shoulder because of her epilepsy  She tried WebEase but found it useless  She did get her 's license back with [employer] report of occasional visual seizures  Summary 2018  Started with -1200  She continues to have focal visual seizures, visual disturbances of circles on the left visual field followed by a headache, no disorientation or confusion; episodic about 1-2 times a month  We had tried higher dose of OXC but she was having balance and cognitive issues    We tried adding Lyrica, but no improvement, weaned off and started LMG  She is driving but she has avoided the highways due to her seizures  She has enough of a warning to stop the car or pull over on local streets  Struggles with depression and anxiety  Summary 2019  -600, -200  There was no improvement with the addition of lamotrigine to the prevention of visual auras, which continue to be infrequent events (presumed focal visual seizures)  Combination of OXC and LMG caused double vision, along with headaches, nausea  Due to headaches we started her on zonisamide and took her off of lamotrigine  Once off lamotrigine, headaches resolved  She struggles with her emotions including cutting behaviors, anxiety, and depression; however, she refuses to accept referral to seeing a behavioral health specialist   She has stress from 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, she likes whom she works with but not who she works for) and family members (who don't get her)  Summary 2020  -600,   Weaned off of lamotrigine due to recurrent headaches  Trial of zonisamide caused weight loss despite good appetite  She did not have recurrent visual seizure until zonisamide was stopped  She was getting therapy at [facility] Latrice Duran MA); not sure if it helped with depression and anxiety (she reported it was getting worse midway through the year)  We increased Effexor include 37 5mg daily  By the end of the year, she was started on Vimpat  Summary [id number]  -600, -100  Discontinued zonisamide due to persistent weight loss  Since being on lacosamide, she has not had a focal sensory / visual seizure  She has been taking magnesium supplements  Since being on Effexor her mood has been better  She had not had a seizure since the addition of lacosamide, we decided to start transitioning her from oxcarbazepine to lacosamide        Special Features  Status epilepticus: No  Self Injury Seizures: No  Precipitating Factors: None    Epilepsy Risk Factors:  Abnormal pregnancy: No  Abnormal birth/: No  Abnormal Development: walked after a year, slower with language  Febrile seizures, simple: Yes  Febrile seizures, complex: No  CNS infection: No  Mental retardation: No  Cerebral palsy: No  Head injury (moderate/severe): No  CNS neoplasm: No  CNS malformation: Yes - MRI brain found ventriculomegaly, abnormal splenium of corpus callosum, and subependymal heterotopia  Neurosurgical procedure: No  Stroke: No  Alcohol abuse: No  Drug abuse: No  Family history Sz/epilepsy: No    Prior AEDs:  Levetiracetam (headaches), oxcarbazepine (continued to have visual aura), gabapentin (side effect), pregabalin (continues to have visual auras and very emotional), lamotrigine (caused more headaches), zonisamide (complaints of weight loss), lacosamide    Prior workup:      [id number]   MRI brain  Bilateral subependymal heterotopias in the region of the atria of the lateral ventricles with bilateral colpocephaly  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  Solitary nonspecific FLAIR hyperintensity subcortical white matter right frontal lobe    2017  MRI brain  Stable colpocephalic appearance to the ventricles, right greater than left, bowing / stretching of the posterior corpus callosum  Subtle foci nodular heterotopia along atria of both lateral ventricles more apparent on the right  Tiny focus of increased signal in the white matter of the right frontal horn is less apparent  Hippocampi are somewhat dysmorphic bilaterally    2019  MRI alonso w/wo  Single white matter hyperintensity in the right anterior superior frontal lobe (unchanged)  Stable colpocephaly of the lateral ventricles, right greater than left  Symmetric hippocampal formations  No heterotopia was identified    EEGs:  2016  Intermittent focal slowing over the right "posterior temporal region but limited to the T6 electrode, consider repeating the study    9/23/2016  Photic driving is lateralized to the left; thus, possibly relative neuronal dysfunction in the right posterior region  4/23/2018  Frequent, right posterior temporal delta slowing and occipital sharp activity during 10 Hz photic stimulation      Labs:  Component      Latest Ref Rng & Units 1/26/2023 4/27/2023   10-HYDROXYCARBAZEPINE      8 0 - 35 0 mcg/mL 3 1 (L)    Lacosamide      mcg/mL 3 7 8 8     General exam   /79 (BP Location: Right arm, Patient Position: Sitting, Cuff Size: Standard)   Pulse 76   Temp 97 7 °F (36 5 °C) (Tympanic)   Resp 18   Ht 5' 3\" (1 6 m)   Wt 54 3 kg (119 lb 12 8 oz)   SpO2 100%   BMI 21 22 kg/m²    Appearance: normally developed, appears well  Carotids: not assessed  Cardiovascular: regular rate and rhythm and normal heart sounds  Pulmonary: clear to auscultation  Abdominal: normal, nondistended  Extremities: no distal edema    HEENT: anicteric and moist mucus membranes / oral cavity   Fundoscopy: not assessed    Mental status  Orientation: alert and oriented to name, place, time  Fund of Knowledge: intact   Attention and Concentration: 83-difficulty with serial 7; HAROON - DNOMAID  Current and Remote Memory:semantic and episodic memories are intact  Language: spontaneous speech is normal and comprehension is intact    Cranial Nerves  CN 1: not tested  CN 2: pupils equal round reactive to direct and consenual light   CN 3, 4, 6: EOMI, no nystagmus  CN 5:not assessed  CN 7:muscles of facial expression are symmetric  CN 8:not assessed  CN 9, 10:no dysarthria present  CN 11:symmetric SCM with head turns  CN 12:not assessed    Motor:  Bulk, Tone: normal bulk, normal tone  Pronation: no pronator drift  Strength: Symmetric strength of the arms and legs, no lateralizing weakness   Abnormal movements: no abnormal movements are present    Sensory:  Lighttouch: intact in all " limbs  Romberg:not assessed    Coordination:  FNF:FNF bilaterally intact  ERICA:intact  FFM:intact  Gait/Station:normal gait and normal tandem gait    Reflexes:  Bilateral brisk 2+/4 reflexes at biceps, triceps, knees, and ankles  Past Medical/Surgical History:  Patient Active Problem List   Diagnosis   • Anxiety   • Congenital cerebral ventriculomegaly ([facility] [address] )   • Localization-related focal epilepsy with complex partial seizures ([facility] [address] )   • Moderate episode of recurrent major depressive disorder (HCC)   • Periventricular heterotopia (HCC)   • Visual distortions   • Nausea   • Chronic right-sided low back pain   • Claustrophobia     History reviewed  No pertinent surgical history  Past Psychiatric History:  Depression: Yes  Anxiety: Yes  Psychosis: No    Medications:    Current Outpatient Medications:   •  lacosamide (VIMPAT) 200 mg tablet, Take 1 tablet (200 mg total) by mouth every 12 (twelve) hours, Disp: 180 tablet, Rfl: 3  •  ondansetron (ZOFRAN) 4 mg tablet, Take 1 po q8hrs PRN nausea and vomiting, Disp: 20 tablet, Rfl: 0  •  venlafaxine (EFFEXOR-XR) 37 5 mg 24 hr capsule, Take 1 capsule (37 5 mg total) by mouth daily Take with one 75mg capsule, Disp: 90 capsule, Rfl: 3  •  venlafaxine (EFFEXOR-XR) 75 mg 24 hr capsule, Take 1 capsule (75 mg total) by mouth daily With one 37 5mg cap, Disp: 90 capsule, Rfl: 3    Allergies:  No Known Allergies    Family history:  Family History   Problem Relation Age of Onset   • No Known Problems Mother    • No Known Problems Father    • No Known Problems Brother      Paternal Lisandro Qiu Grandmother started to have seizures later in life    Social History  Living situation:  lives with parents  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, works in retail; 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

## 2023-05-12 NOTE — PROGRESS NOTES
Review of Systems   Constitutional: Negative  Negative for appetite change and fever  HENT: Negative  Negative for hearing loss, tinnitus, trouble swallowing and voice change  Eyes: Negative  Negative for photophobia, pain and visual disturbance  Respiratory: Negative  Negative for shortness of breath  Cardiovascular: Negative  Negative for palpitations  Gastrointestinal: Negative  Negative for nausea and vomiting  Endocrine: Negative  Negative for cold intolerance  Genitourinary: Negative  Negative for dysuria, frequency and urgency  Musculoskeletal: Negative  Negative for gait problem, myalgias and neck pain  Skin: Negative  Negative for rash  Allergic/Immunologic: Negative  Neurological: Negative  Negative for dizziness, tremors, seizures, syncope, facial asymmetry, speech difficulty, weakness, light-headedness, numbness and headaches  Hematological: Negative  Does not bruise/bleed easily  Psychiatric/Behavioral: Negative for confusion, hallucinations and sleep disturbance  The patient is nervous/anxious (anxiety)           Depression, mood swinigs

## 2023-05-12 NOTE — PATIENT INSTRUCTIONS
Plan:   1 - continue with lacosamide 200mg twice a day  2 - may discontinue oxcarbazepine  3 - continue with Venlafaxine ER 75mg and 37 5mg one tab each daily  4 - follow-up with advanced practitioner 6 months   5 - check BMP and lacosamide level a week prior to next visit  6 - call the office if you experience recurrence of visual seizure, if you are having issues with medication side effects, worsening depression and anxiety symptoms

## 2023-07-17 DIAGNOSIS — R11.0 NAUSEA: ICD-10-CM

## 2023-07-19 RX ORDER — ONDANSETRON 4 MG/1
TABLET, FILM COATED ORAL
Qty: 20 TABLET | Refills: 0 | Status: SHIPPED | OUTPATIENT
Start: 2023-07-19

## 2023-09-25 ENCOUNTER — OFFICE VISIT (OUTPATIENT)
Dept: OBGYN CLINIC | Facility: CLINIC | Age: 28
End: 2023-09-25
Payer: MEDICARE

## 2023-09-25 VITALS — BODY MASS INDEX: 22.04 KG/M2 | WEIGHT: 124.4 LBS | DIASTOLIC BLOOD PRESSURE: 90 MMHG | SYSTOLIC BLOOD PRESSURE: 132 MMHG

## 2023-09-25 DIAGNOSIS — Z01.419 ENCOUNTER FOR GYNECOLOGICAL EXAMINATION (GENERAL) (ROUTINE) WITHOUT ABNORMAL FINDINGS: Primary | ICD-10-CM

## 2023-09-25 PROCEDURE — 99385 PREV VISIT NEW AGE 18-39: CPT | Performed by: STUDENT IN AN ORGANIZED HEALTH CARE EDUCATION/TRAINING PROGRAM

## 2023-09-25 NOTE — PROGRESS NOTES
Jw Mayer  1995    Assessment and Plan:  Yearly exam without abnormality.     -Pap testing deferred given virginal status, lack of sexual activity. We reviewed ASCCP guidelines for Pap testing today. -Exam deferred today for patient comfort. Can attempt breast exam with chaperone next year. -Recommend trial of textile-based period underwear for vulvar irritation    RTO one year for yearly exam or sooner as needed. CC:  Yearly exam    S:  29 y.o. female here for yearly exam.     G0 - virginal   LMP 9/22   Last Pap: n/a    Non-smoker, social drinker  Exercises irregularly    Doing ok overall. Very anxious today, as this is her first time at the gyn and she presents alone. Has a fear of physicians and visits since her initial diagnosis of epilepsy. During a grand mal seizure, she notes that many things were "done to her" and that though she believes that people were trying to explain things to her, she was somewhat disoriented. Also notes feeling very self conscious. After time, does grow accustomed to places/physicians and feels that this will be possible moving forwards. Her cycles are regular, not heavy or crampy. Changes pad ~3 times per day. Some irritation with pads. Sexual activity: She is not sexually active. Not dating, planning on waiting for marriage. Gardasil:  She has not had the Gardasil series. Family hx of breast cancer: denies  Family hx of ovarian cancer: denies  Family hx of colon cancer: denies     Denies hot flushes, dyspareunia, abnormal uterine bleeding, urinary/fecal incontinence, changes in energy levels, mood.        Current Outpatient Medications:   •  lacosamide (VIMPAT) 200 mg tablet, Take 1 tablet (200 mg total) by mouth every 12 (twelve) hours, Disp: 180 tablet, Rfl: 3  •  ondansetron (ZOFRAN) 4 mg tablet, Take 1 po q8hrs PRN nausea and vomiting, Disp: 20 tablet, Rfl: 0  •  venlafaxine (EFFEXOR-XR) 37.5 mg 24 hr capsule, Take 1 capsule (37.5 mg total) by mouth daily Take with one 75mg capsule, Disp: 90 capsule, Rfl: 3  •  venlafaxine (EFFEXOR-XR) 75 mg 24 hr capsule, Take 1 capsule (75 mg total) by mouth daily With one 37.5mg cap, Disp: 90 capsule, Rfl: 3  Social History     Socioeconomic History   • Marital status: Single     Spouse name: Not on file   • Number of children: Not on file   • Years of education: Not on file   • Highest education level: Not on file   Occupational History   • Not on file   Tobacco Use   • Smoking status: Never   • Smokeless tobacco: Never   Vaping Use   • Vaping Use: Never used   Substance and Sexual Activity   • Alcohol use: Yes     Comment: socially   • Drug use: No   • Sexual activity: Never   Other Topics Concern   • Not on file   Social History Narrative   • Not on file     Social Determinants of Health     Financial Resource Strain: Not on file   Food Insecurity: Not on file   Transportation Needs: Not on file   Physical Activity: Not on file   Stress: Not on file   Social Connections: Not on file   Intimate Partner Violence: Not on file   Housing Stability: Not on file     Family History   Problem Relation Age of Onset   • No Known Problems Mother    • No Known Problems Father    • No Known Problems Brother    • Cancer Maternal Grandmother    • Heart attack Paternal Grandfather       Past Medical History:   Diagnosis Date   • Cerebral ventriculomegaly 08/25/2016   • Depression    • Epilepsy ([address])    • Migraine    • Seizures ([address])    • Self-injurious behavior 09/09/2019    Cutting behavior   • Syncopal episodes         Review of Systems   Respiratory: Negative. Cardiovascular: Negative. Gastrointestinal: Negative for constipation and diarrhea. Genitourinary: Negative for difficulty urinating, pelvic pain, vaginal bleeding, vaginal discharge, itching or odor. O:  Blood pressure 132/90, weight 56.4 kg (124 lb 6.4 oz), last menstrual period 09/22/2023.     Patient appears anxious, clasping arms over body with legs crossed. Alert and oriented.    Remainder of exam deferred today

## 2023-10-03 DIAGNOSIS — G40.209 LOCALIZATION-RELATED FOCAL EPILEPSY WITH COMPLEX PARTIAL SEIZURES (HCC): ICD-10-CM

## 2023-10-03 RX ORDER — LACOSAMIDE 200 MG/1
200 TABLET ORAL EVERY 12 HOURS SCHEDULED
Qty: 180 TABLET | Refills: 0 | Status: CANCELLED | OUTPATIENT
Start: 2023-10-03

## 2023-10-03 RX ORDER — LACOSAMIDE 200 MG/1
200 TABLET ORAL EVERY 12 HOURS SCHEDULED
Qty: 180 TABLET | Refills: 1 | Status: SHIPPED | OUTPATIENT
Start: 2023-10-03

## 2023-11-04 LAB
BUN SERPL-MCNC: 14 MG/DL (ref 7–25)
BUN/CREAT SERPL: NORMAL (CALC) (ref 6–22)
CALCIUM SERPL-MCNC: 9.7 MG/DL (ref 8.6–10.2)
CHLORIDE SERPL-SCNC: 103 MMOL/L (ref 98–110)
CO2 SERPL-SCNC: 29 MMOL/L (ref 20–32)
CREAT SERPL-MCNC: 0.78 MG/DL (ref 0.5–0.96)
GFR/BSA.PRED SERPLBLD CYS-BASED-ARV: 106 ML/MIN/1.73M2
GLUCOSE SERPL-MCNC: 92 MG/DL (ref 65–99)
LACOSAMIDE SERPL-MCNC: 10.2 MCG/ML
POTASSIUM SERPL-SCNC: 4.5 MMOL/L (ref 3.5–5.3)
SODIUM SERPL-SCNC: 139 MMOL/L (ref 135–146)

## 2023-11-06 ENCOUNTER — TELEPHONE (OUTPATIENT)
Dept: NEUROLOGY | Facility: CLINIC | Age: 28
End: 2023-11-06

## 2023-11-06 NOTE — TELEPHONE ENCOUNTER
Called and spoke to patient. Patient confirmed upcoming apt with Rayna Mejia PA-C on 11/13/23 @ 8:15 am in the [facility].

## 2023-11-13 ENCOUNTER — OFFICE VISIT (OUTPATIENT)
Dept: NEUROLOGY | Facility: CLINIC | Age: 28
End: 2023-11-13
Payer: MEDICARE

## 2023-11-13 VITALS
HEIGHT: 63 IN | HEART RATE: 90 BPM | WEIGHT: 125 LBS | TEMPERATURE: 98.2 F | BODY MASS INDEX: 22.15 KG/M2 | RESPIRATION RATE: 18 BRPM | OXYGEN SATURATION: 98 % | SYSTOLIC BLOOD PRESSURE: 132 MMHG | DIASTOLIC BLOOD PRESSURE: 80 MMHG

## 2023-11-13 DIAGNOSIS — F33.1 MODERATE EPISODE OF RECURRENT MAJOR DEPRESSIVE DISORDER (HCC): ICD-10-CM

## 2023-11-13 DIAGNOSIS — F41.9 ANXIETY: ICD-10-CM

## 2023-11-13 DIAGNOSIS — G40.209 LOCALIZATION-RELATED FOCAL EPILEPSY WITH COMPLEX PARTIAL SEIZURES (HCC): Primary | ICD-10-CM

## 2023-11-13 PROCEDURE — 99214 OFFICE O/P EST MOD 30 MIN: CPT | Performed by: PHYSICIAN ASSISTANT

## 2023-11-13 NOTE — PATIENT INSTRUCTIONS
Plan:   1 - continue with lacosamide 200mg twice a day  2 - continue with Venlafaxine ER 75mg and 37.5mg one tab each daily  3 - check BMP and lacosamide level a week prior to next visit  4 - call the office if you experience recurrence of visual seizure, if you are having issues with medication side effects, worsening depression and anxiety symptoms.    5 - follow up in 6 months with Dr. Wilner Dickson

## 2023-11-13 NOTE — PROGRESS NOTES
Patient ID: Laura Zhang is a 29 y.o. female. Assessment:  Ms. Saumya Rubio is a 29 y.o. 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 She has done well since staring lacosamide without recurrence of her visual auras. Her oxcarbazepine was discontinued completely at timing of her last visit 6 months ago, and she has been doing well with lacosamide monotherapy. No events concerning for seizure. Since reducing and then discontinuing oxcarbazepine, her mood seems to be better, as well as her headaches. She will continue with current dose of venlafaxine for her major depression disorder. Overall she is doing quite well. We will have her continue lacosamide monotherapy. Plan:   1 - continue with lacosamide 200mg twice a day  2 - continue with Venlafaxine ER 75mg and 37.5mg one tab each daily  3 - check BMP and lacosamide level a week prior to next visit  4 - call the office if you experience recurrence of visual seizure, if you are having issues with medication side effects, worsening depression and anxiety symptoms. 5 - follow up in 6 months with Dr. Mitch Segundo         Diagnoses and all orders for this visit:    Localization-related focal epilepsy with complex partial seizures ([address])  -     Basic metabolic panel; Future  -     Lacosamide; Future    Moderate episode of recurrent major depressive disorder (HCC)    Anxiety           Subjective:    HPI    Laura Zhang is a 29 y.o. right handed female here for follow-up evaluation of focal seizures, visual distortions with headache and difficulty dealing with her emotions. Interval history 11/13/2023  She was last seen by Dr. Mitch Segundo on 5/12/23. Oxcarbazepine was discontinued at timing of this visit. Since her last visit she has been doing well on lacosamide monotherapy. She has not had any events concerning for seizure. She denies altered awareness, LOC, visual disturbances associated with headache, abnormal involuntary movements etc.  She gets headaches around the time of her period which are typical for her, mild, and unchanged. Her mood has been ok, says most of her stress is related to her job, but she is still very happy with her job. She feels she is managing stress well. She was very happy to find out that she gained more weight since her last visit. She has gained 10lbs in the last year and is extremely proud of herself for that. Labs completed recently 11/1/23. Lacosamide level = 10.1, normal BMP. AED/side effects/compliance:  Lacosamide 200-200     Event/Seizure semiology:  1. Lightheaded blurry vision, loss of consciousness, convulsion (Last one 8/24/2016)  2. Blurry vision, circles in a part of her visual field (left side), followed by a period of headache, no clear confusion or disorientation (described in January 2017 visit) - recurs 2-3 times a month     Women of childbearing age:  Contraception: not sexual activity  Folic acid: no - does not want at this point     Prior Epilepsy History:  Initial intake epilepsy history 9/12/2016  Review of hospital records. Ms. Hiren Lynn was admitted from 8/24-8/26/2016 to [facility] after a witnessed seizure. She had an MRI brain study that showed cortical malformations including heterotopias, she was subsequently started on levetiracetam 500mg twice a day. Dr. Vanessa Watt did the initial consultation; she reported that her first seizure was in June 2016, onset of lightheadedness, blurry vision, no memory of passing out but she collapsed, the next thing she remembered was waking up in the ambulance.  On the day of admission she felt lightheaded, blurry vision, veering to the sides of the aisle and collapsed, her mother witnessed some of the event but only after she fell; patient's eyes were open and mouth was moving, arms were tonically flexed and asymmetric and legs were hypertonic with repeated relaxation and tonic stiffening. Again she did not regain consciousness until she was in the ambulance. She had a history of one febrile seizure, a paternal great grandmother with seizures, head CT scan found ventriculomegaly. Patient's history  June 2016, Ms. Cunningham recalled that she was at work and the next thing she remembered was people standing over her, she did not remember what happened before other than feeling lightheaded and blurry vision. She denied feeling exhausted, new medications, drugs, illness/fevers, and there were no changes in her usual habits. August 2016, again she does not recall much other than being told she had a seizure for less minute. Her mother reported that she did see the end of the seizure; initially Ms. Divina Trujillo had her the arms and legs extended outward, then arms were flexed inward, convulsion lasted about 30 minute, and she was unresponsive for about 5 minutes but she was lethargic for about 15-20 minutes afterwards. Her mother had briefly turned away and did not witness the very start of the seizure. Again, there were no alcohol, drugs, medications, or illness experienced by Ms. Cunningham. She denies any history of myoclonus, staring spells, automatisms, unexplained hyperkinetic behaviors, olfactory / gustatory hallucinations, epigastric rising events, boogie vu events, visual hallucinations, unexplained nocturnal enuresis, or nocturnal tongue biting. At 17 months, she had a cold with a super high temperature, she had a seizure, deemed to have been a simple febrile seizure. LEV was associated with daily headaches that come and go throughout the day may last for a couple of hours at a time. These are very severe because she does not do well with pain. Headache is best describe as a throbbing sensation for a couple of hours, daily, she does not take any medication for pain because she is not sure if she can.  There is no visual changes, no loss of vision, or changes in refraction. Summary 2017  Switched LVT to [place], better tolerated, over the course of 2017, OXC was escalated for recurrent visual distortions of the left side, circles, blurry vision (sensation of visual obscuration lasting for 2 minutes, left side swirls, if severe enough that she would temporarily not see clearly on the left visual field), lasting minutes without altered awareness or loss of consciousness. There is a headache that follows for about half an hour. These visual distortions have been sporadic once 1-5 months. We had tried gabapentin in the Spring/summer 2017 but she stopped taking it because of side effects / headaches (horrible, feeling like everything was in slow motion). Sometimes these visual distortions are triggered by stress at work. She works part time as a  4-5 hours. I wrote a letter on her behalf to get a 10-15 minutes break every 2 hours. She reports issues with anxiety, mostly as it involves the possibility of a recurrent seizure. She feels like she has been looking over her shoulder because of her epilepsy. She tried [employer] but found it useless. [facility]. Summary 2018  Started with -1200. She continues to have focal visual seizures, visual disturbances of circles on the left visual field followed by a headache, no disorientation or confusion; episodic about 1-2 times a month. We had tried higher dose of OXC but she was having balance and cognitive issues. We tried adding Lyrica, but no improvement, weaned off and started LMG. She is driving but she has avoided the highways due to her seizures. She has enough of a warning to stop the car or pull over on local streets. Struggles with depression and anxiety. Summary 2019  -600, -200.  There was no improvement with the addition of lamotrigine to the prevention of visual auras, which continue to be infrequent events (presumed focal visual seizures). Combination of OXC and LMG caused double vision, along with headaches, nausea. Due to headaches we started her on zonisamide and took her off of lamotrigine. Once off lamotrigine, headaches resolved. She struggles with her emotions including cutting behaviors, anxiety, and depression; however, she refuses to accept referral to seeing a behavioral health specialist. 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 Summary 2020  -600, . Weaned off of lamotrigine due to recurrent headaches. Trial of zonisamide caused weight loss despite good appetite. She did not have recurrent visual seizure until zonisamide was stopped  She was getting therapy at [facility] Mccrackenclyde Garcia MA); not sure if it helped with depression and anxiety (she reported it was getting worse midway through the year). We increased Effexor include 37.5mg daily. By the end of the year, she was started on Vimpat. Summary [id number]  -600, -100. Discontinued zonisamide due to persistent weight loss. Since being on lacosamide, she has not had a focal sensory / visual seizure. She has been taking magnesium supplements. Since being on Effexor her mood has been better. She had not had a seizure since the addition of lacosamide, we decided to start transitioning her from oxcarbazepine to lacosamide. Interval history 5/12/2023  She was last seen by Maximo Polk on 2/8/2023. No seizures reported at that visit. She continued to have episodes of depression. Started to wean off of oxcarbazepine. She is in a good mood today. She feels better now that she has significantly reduced her dose of oxcarbazepine.  She reports no side effects on the higher doses of oxcarbazepine, no difficulty with balance, double vision, or upset stomach. There have been no recurrent seizures, visual auras, altered awareness  She is better than usual. Mood is better. Her friend has returned from   She is hardly taking Zofran. She has headaches around the time of her menstrual cycle, so it is not unusual for her to have headaches.       Special Features  Status epilepticus: No  Self Injury Seizures: No  Precipitating Factors: None     Epilepsy Risk Factors:  Abnormal pregnancy: No  Abnormal birth/: No  Abnormal Development: walked after a year, slower with language  Febrile seizures, simple: Yes  Febrile seizures, complex: No  CNS infection: No  Mental retardation: No  Cerebral palsy: No  Head injury (moderate/severe): No  CNS neoplasm: No  CNS malformation: Yes - MRI brain found ventriculomegaly, abnormal splenium of corpus callosum, and subependymal heterotopia  Neurosurgical procedure: No  Stroke: No  Alcohol abuse: No  Drug abuse: No  Family history Sz/epilepsy: No     Prior AEDs:  Levetiracetam (headaches), oxcarbazepine (continued to have visual aura), gabapentin (side effect), pregabalin (continues to have visual auras and very emotional), lamotrigine (caused more headaches), zonisamide (complaints of weight loss), lacosamide     Prior workup:  [id number]   MRI brain  Bilateral subependymal heterotopias in the region of the atria of the lateral ventricles with bilateral colpocephaly  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  Solitary nonspecific FLAIR hyperintensity subcortical white matter right frontal lobe     2017  MRI brain  Stable colpocephalic appearance to the ventricles, right greater than left, bowing / stretching of the posterior corpus callosum  Subtle foci nodular heterotopia along atria of both lateral ventricles more apparent on the right  Tiny focus of increased signal in the white matter of the right frontal horn is less apparent  Hippocampi are somewhat dysmorphic bilaterally     2019  MRI alonso w/wo  Single white matter hyperintensity in the right anterior superior frontal lobe (unchanged)  Stable colpocephaly of the lateral ventricles, right greater than left  Symmetric hippocampal formations  No heterotopia was identified     EEGs:  8/25/2016  Intermittent focal slowing over the right posterior temporal region but limited to the T6 electrode, consider repeating the study     9/23/2016  Photic driving is lateralized to the left; thus, possibly relative neuronal dysfunction in the right posterior region. 4/23/2018  Frequent, right posterior temporal delta slowing and occipital sharp activity during 10 Hz photic stimulation. The following portions of the patient's history were reviewed and updated as appropriate: current medications, past family history, past medical history, past social history, past surgical history, and problem list.         Objective:    Blood pressure 132/80, pulse 90, temperature 98.2 °F (36.8 °C), temperature source Temporal, resp. rate 18, height 5' 3" (1.6 m), weight 56.7 kg (125 lb), SpO2 98 %. Physical Exam  Constitutional:       Appearance: Normal appearance. Eyes:      Extraocular Movements: EOM normal.      Pupils: Pupils are equal, round, and reactive to light. Neurological:      Mental Status: She is alert. Motor: Motor strength is normal.     Deep Tendon Reflexes: Reflexes are normal and symmetric. Psychiatric:         Mood and Affect: Mood normal.         Speech: Speech normal.         Behavior: Behavior normal.         Neurological Exam  Mental Status  Alert. Oriented to person, place, time and situation. Speech is normal. Language is fluent with no aphasia. Attention and concentration are normal.    Cranial Nerves  CN II: Visual fields full to confrontation. CN III, IV, VI: Extraocular movements intact bilaterally. Pupils equal round and reactive to light bilaterally.   CN V: Facial sensation is normal.  CN VII: Full and symmetric facial movement. CN VIII: Hearing is normal.  CN IX, X: Palate elevates symmetrically  CN XI: Shoulder shrug strength is normal.  CN XII: Tongue midline without atrophy or fasciculations. Motor   Normal muscle tone. Strength is 5/5 throughout all four extremities. Sensory  Light touch is normal in upper and lower extremities. Reflexes  Deep tendon reflexes are 2+ and symmetric in all four extremities. Coordination  Right: Finger-to-nose normal.Left: Finger-to-nose normal.    Gait  Casual gait is normal including stance, stride, and arm swing. No Known Allergies     Past Medical History:   Diagnosis Date    Cerebral ventriculomegaly 08/25/2016    Depression     Epilepsy ([address])     Migraine     Seizures ([address])     Self-injurious behavior 09/09/2019    Cutting behavior    Syncopal episodes       History reviewed. No pertinent surgical history. Family History   Problem Relation Age of Onset    No Known Problems Mother     No Known Problems Father     No Known Problems Brother     Cancer Maternal Grandmother     Heart attack Paternal Grandfather       Past Psychiatric History:  Depression: Yes  Anxiety: Yes  Psychosis: No    Social History  Living situation:  lives with parents  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, works in retail; 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[address] [employer] and [facility]); sometimes she needs to use a ladder. Driving:  active license   reports that she has never smoked. She has never used smokeless tobacco. She reports current alcohol use. She reports that she does not use drugs. ROS:    Review of Systems   Constitutional:  Negative for chills and fever. HENT:  Negative for ear pain and sore throat. Eyes:  Negative for pain and visual disturbance. Respiratory:  Negative for cough and shortness of breath. Cardiovascular:  Negative for chest pain and palpitations. Gastrointestinal:  Positive for constipation. Negative for abdominal pain and vomiting.    Genitourinary: Negative for dysuria and hematuria. Musculoskeletal:  Negative for arthralgias and back pain. Skin:  Negative for color change and rash. Neurological:  Negative for seizures and syncope. Hematological:  Bruises/bleeds easily. Psychiatric/Behavioral:          Depression and anxiety   All other systems reviewed and are negative.     I personally reviewed and updated the ROS as appropriate

## 2024-04-02 DIAGNOSIS — F33.1 MODERATE EPISODE OF RECURRENT MAJOR DEPRESSIVE DISORDER (HCC): ICD-10-CM

## 2024-04-02 DIAGNOSIS — G40.209 LOCALIZATION-RELATED FOCAL EPILEPSY WITH COMPLEX PARTIAL SEIZURES (HCC): ICD-10-CM

## 2024-04-02 RX ORDER — LACOSAMIDE 200 MG/1
200 TABLET ORAL EVERY 12 HOURS SCHEDULED
Qty: 180 TABLET | Refills: 1 | Status: SHIPPED | OUTPATIENT
Start: 2024-04-02

## 2024-04-02 RX ORDER — VENLAFAXINE HYDROCHLORIDE 75 MG/1
75 CAPSULE, EXTENDED RELEASE ORAL DAILY
Qty: 90 CAPSULE | Refills: 1 | Status: SHIPPED | OUTPATIENT
Start: 2024-04-02

## 2024-04-02 RX ORDER — VENLAFAXINE HYDROCHLORIDE 37.5 MG/1
37.5 CAPSULE, EXTENDED RELEASE ORAL DAILY
Qty: 90 CAPSULE | Refills: 1 | Status: SHIPPED | OUTPATIENT
Start: 2024-04-02

## 2024-05-04 LAB
BUN SERPL-MCNC: 16 MG/DL (ref 7–25)
BUN/CREAT SERPL: NORMAL (CALC) (ref 6–22)
CALCIUM SERPL-MCNC: 9.4 MG/DL (ref 8.6–10.2)
CHLORIDE SERPL-SCNC: 101 MMOL/L (ref 98–110)
CO2 SERPL-SCNC: 30 MMOL/L (ref 20–32)
CREAT SERPL-MCNC: 0.95 MG/DL (ref 0.5–0.96)
GFR/BSA.PRED SERPLBLD CYS-BASED-ARV: 84 ML/MIN/1.73M2
GLUCOSE SERPL-MCNC: 89 MG/DL (ref 65–99)
LACOSAMIDE SERPL-MCNC: 5 MCG/ML
POTASSIUM SERPL-SCNC: 4.2 MMOL/L (ref 3.5–5.3)
SODIUM SERPL-SCNC: 138 MMOL/L (ref 135–146)

## 2024-05-06 ENCOUNTER — TELEPHONE (OUTPATIENT)
Dept: NEUROLOGY | Facility: CLINIC | Age: 29
End: 2024-05-06

## 2024-05-06 NOTE — TELEPHONE ENCOUNTER
Made an appt reminder call no answer lmom. Appt with dr Aquino  on 5/13/24  @8:00 am in [facility].

## 2024-05-13 ENCOUNTER — OFFICE VISIT (OUTPATIENT)
Dept: NEUROLOGY | Facility: CLINIC | Age: 29
End: 2024-05-13
Payer: MEDICARE

## 2024-05-13 ENCOUNTER — TELEPHONE (OUTPATIENT)
Dept: NEUROLOGY | Facility: CLINIC | Age: 29
End: 2024-05-13

## 2024-05-13 VITALS
DIASTOLIC BLOOD PRESSURE: 82 MMHG | HEART RATE: 94 BPM | RESPIRATION RATE: 14 BRPM | SYSTOLIC BLOOD PRESSURE: 122 MMHG | HEIGHT: 63 IN | BODY MASS INDEX: 23.6 KG/M2 | TEMPERATURE: 98.3 F | WEIGHT: 133.2 LBS | OXYGEN SATURATION: 99 %

## 2024-05-13 DIAGNOSIS — R11.0 NAUSEA: ICD-10-CM

## 2024-05-13 DIAGNOSIS — G40.209 LOCALIZATION-RELATED FOCAL EPILEPSY WITH COMPLEX PARTIAL SEIZURES (HCC): Primary | ICD-10-CM

## 2024-05-13 DIAGNOSIS — F41.9 ANXIETY: ICD-10-CM

## 2024-05-13 DIAGNOSIS — R26.89 BALANCE PROBLEM: ICD-10-CM

## 2024-05-13 DIAGNOSIS — F33.1 MODERATE EPISODE OF RECURRENT MAJOR DEPRESSIVE DISORDER (HCC): ICD-10-CM

## 2024-05-13 PROCEDURE — 99214 OFFICE O/P EST MOD 30 MIN: CPT | Performed by: PSYCHIATRY & NEUROLOGY

## 2024-05-13 RX ORDER — LACOSAMIDE 200 MG/1
200 TABLET ORAL EVERY 12 HOURS SCHEDULED
Qty: 180 TABLET | Refills: 1 | Status: SHIPPED | OUTPATIENT
Start: 2024-05-13

## 2024-05-13 RX ORDER — ONDANSETRON 4 MG/1
TABLET, FILM COATED ORAL
Qty: 10 TABLET | Refills: 0 | Status: SHIPPED | OUTPATIENT
Start: 2024-05-13

## 2024-05-13 RX ORDER — CALCIUM POLYCARBOPHIL 625 MG 625 MG/1
625 TABLET ORAL DAILY
COMMUNITY

## 2024-05-13 RX ORDER — VENLAFAXINE HYDROCHLORIDE 37.5 MG/1
37.5 CAPSULE, EXTENDED RELEASE ORAL DAILY
Qty: 90 CAPSULE | Refills: 3 | Status: SHIPPED | OUTPATIENT
Start: 2024-05-13

## 2024-05-13 RX ORDER — VENLAFAXINE HYDROCHLORIDE 75 MG/1
75 CAPSULE, EXTENDED RELEASE ORAL DAILY
Qty: 90 CAPSULE | Refills: 3 | Status: SHIPPED | OUTPATIENT
Start: 2024-05-13

## 2024-05-13 NOTE — TELEPHONE ENCOUNTER
----- Message from Lucila Aquino MD sent at 5/13/2024  8:38 AM EDT -----  Regarding: finding a therapist  Please assist in finding a therapist for Jenn; she has been having ups and downs with her depression.    Puneet

## 2024-05-13 NOTE — PATIENT INSTRUCTIONS
Plan:   Focal epilepsy  1 - continue with lacosamide 200mg twice a day  2 - call the office if you experience recurrence of visual seizure, if you are having issues with medication side effects, worsening depression and anxiety symptoms.     Depression  1 - continue with Venlafaxine ER 75mg and 37.5mg one tab each daily  2 - referral to Psych services    Feeling off balance  1 - referral to physical therapy for evaluation    Follow-up with advanced practitioner 6 months

## 2024-05-13 NOTE — TELEPHONE ENCOUNTER
[facility]  [address] [facility] [place] [address]  [phone]     [employer] [facility]  [address]  [place] [address]  [phone]     Accepting new patients: Yes  [facility]  [address]  [place] [address]  [phone]     [facility]  [address]  [place] [address]  [phone]     [facility]  [address] [facility] [place] [address]  [phone]     [facility]  [address] [facility] [address]  [facility] [address]  [phone]     [facility]   [address] [place] [address]  [phone]    [facility] counseling - [place]  [phone]    Concern - [place]  [phone]    [facility] [phone]  [address] [place] [address]  [address], [place] [address]     [facility] [phone]  [facility]     [facility]   [phone]  [address] [place] [address]  [address] [place] [address]   [facility]  [address] [place] [id number] [phone]     [facility] at [phone]. (can do med management and counseling)     [facility] - [phone] intake [phone] (counseling and med management must do both)    [facility] (must do med management and counseling in one site )  [address] [facility] [facility] [address], [place] [address]  [phone]

## 2024-05-13 NOTE — PROGRESS NOTES
[facility]  Name:  Jenn Cunningham   : 1995   Visit Type: follow-up  Referring MD / PCP:  Girish Otero DO     Assessment:  Ms. Jenn Cunningham is a 28 y.o. 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  She has co-morbid anxiety and depressive disorders.  She continues to have mild depressive symptoms, which has been treated with venlafaxine.  We discussed considering increasing venlafaxine dose, but I recommended that she sees a therapist and psychiatrist for management of her behavior health disorders.  During this visit, she mentions that she feels off balance and right sided weakness; on exam there is no appreciable weakness or gait impairment.  Lacosamide may cause balance issues.  I recommend that she considers physical therapy to improve her balance and endurance.      Plan:   Focal epilepsy  1 - continue with lacosamide 200mg twice a day  2 - call the office if you experience recurrence of visual seizure, if you are having issues with medication side effects, worsening depression and anxiety symptoms.     Depression  1 - continue with Venlafaxine ER 75mg and 37.5mg one tab each daily  2 - referral to Psych services    Feeling off balance  1 - referral to physical therapy for evaluation    Follow-up with advanced practitioner 6 months       Problem List Items Addressed This Visit          Nervous and Auditory    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) - Primary    Relevant Medications    lacosamide (VIMPAT) 200 mg tablet       Behavioral Health    Anxiety    Moderate episode of recurrent major depressive disorder (HCC)    Relevant Medications    venlafaxine (EFFEXOR-XR) 37.5 mg 24 hr capsule    venlafaxine (EFFEXOR-XR) 75 mg 24 hr capsule    Other Relevant Orders    Ambulatory referral to Psych Services        Neurology/Sleep    Balance problem    Relevant Orders    Ambulatory Referral to Physical Therapy       Other    Nausea    Relevant Medications    ondansetron (ZOFRAN) 4 mg tablet       Chief Complaint:    Chief Complaint    Follow-up       HPI:    Jenn Cunningham is a 28 y.o. right handed female here for follow-up evaluation of focal seizures, visual distortions.      Interval history 5/13/2024  She reports there have been no seizures, visual auras, periods of confusion or loss of consciousness.  There have been no issues with her balance or shakey vision.  There have been no migraines or headache.  She rarely uses Zofran for intermittent nausea when she holiday meals.  Mood have been stable, but she feels that there are days that she is down, lack of energy or lack of enjoyment.  There have been no thoughts of self harm.  The last time she had a counseling session in 2020.  She works 5 days a week, but when she does not work she has no energy to motivate herself.    She feels that her right side is weaker ever since her seizure.  She had a fall this past weekend.      AED/side effects/compliance:  Lacosamide 200-200    Event/Seizure semiology:  1.  Lightheaded blurry vision, loss of consciousness, convulsion  (Last one 8/24/2016)  2.  Blurry vision, circles in a part of her visual field (left side), followed by a period of headache, no clear confusion or disorientation (described in January 2017 visit) - recurs 2-3 times a month    Women of childbearing age:  Contraception: not sexual activity  Folic acid: no - does not want at this point    Prior Epilepsy History:  Initial intake epilepsy history 9/12/2016  Review of hospital records.  Ms. Cunningham was admitted from 8/24-8/26/2016 to [facility] after a witnessed seizure.  She had an MRI brain study that showed cortical malformations including heterotopias, she was subsequently started on levetiracetam 500mg twice a day.  Dr. Portillo did the initial consultation; she  reported that her first seizure was in June 2016, onset of lightheadedness, blurry vision, no memory of passing out but she collapsed, the next thing she remembered was waking up in the ambulance.  On the day of admission she felt lightheaded, blurry vision, veering to the sides of the aisle and collapsed, her mother witnessed some of the event but only after she fell; patient’s eyes were open and mouth was moving, arms were tonically flexed and asymmetric and legs were hypertonic with repeated relaxation and tonic stiffening.  Again she did not regain consciousness until she was in the ambulance.  She had a history of one febrile seizure, a paternal great grandmother with seizures, head CT scan found ventriculomegaly.      Patient’s history  June 2016, Ms. Cunningham recalled that she was at work and the next thing she remembered was people standing over her, she did not remember what happened before other than feeling lightheaded and blurry vision.  She denied feeling exhausted, new medications, drugs, illness/fevers, and there were no changes in her usual habits.  August 2016, again she does not recall much other than being told she had a seizure for less minute.  Her mother reported that she did see the end of the seizure; initially Ms. Cunningham had her the arms and legs extended outward, then arms were flexed inward, convulsion lasted about 30 minute, and she was unresponsive for about 5 minutes but she was lethargic for about 15-20 minutes afterwards.  Her mother had briefly turned away and did not witness the very start of the seizure.  Again, there were no alcohol, drugs, medications, or illness experienced by Ms. Cunningham.  She denies any history of myoclonus, staring spells, automatisms, unexplained hyperkinetic behaviors, olfactory / gustatory hallucinations, epigastric rising events, boogie vu events, visual hallucinations, unexplained nocturnal enuresis, or nocturnal tongue biting.  At 17 months, she had a cold  with a super high temperature, she had a seizure, deemed to have been a simple febrile seizure.    LEV was associated with daily headaches that come and go throughout the day may last for a couple of hours at a time.  These are very severe because she does not do well with pain.  Headache is best describe as a throbbing sensation for a couple of hours, daily, she does not take any medication for pain because she is not sure if she can.  There is no visual changes, no loss of vision, or changes in refraction.     Summary 2017  Switched LVT to OXC, better tolerated, over the course of 2017, OXC was escalated for recurrent visual distortions of the left side, circles, blurry vision (sensation of visual obscuration lasting for 2 minutes, left side swirls, if severe enough that she would temporarily not see clearly on the left visual field), lasting minutes without altered awareness or loss of consciousness.  There is a headache that follows for about half an hour.  These visual distortions have been sporadic once 1-5 months.  We had tried gabapentin in the Spring/summer 2017 but she stopped taking it because of side effects / headaches (horrible, feeling like everything was in slow motion).  Sometimes these visual distortions are triggered by stress at work.  She works part time as a  4-5 hours.  I wrote a letter on her behalf to get a 10-15 minutes break every 2 hours.    She reports issues with anxiety, mostly as it involves the possibility of a recurrent seizure.  She feels like she has been looking over her shoulder because of her epilepsy.  She tried [employer] but found it useless.  She did get her 's license back with [employer] report of occasional visual seizures.    Summary 2018  Started with -1200.   She continues to have focal visual seizures, visual disturbances of circles on the left visual field followed by a headache, no disorientation or confusion; episodic about 1-2 times a month.  We had  tried higher dose of OXC but she was having balance and cognitive issues.  We tried adding Lyrica, but no improvement, weaned off and started LMG.  She is driving but she has avoided the highways due to her seizures.  She has enough of a warning to stop the car or pull over on local streets.  Struggles with depression and anxiety.    Summary 2019  -600, -200.  There was no improvement with the addition of lamotrigine to the prevention of visual auras, which continue to be infrequent events (presumed focal visual seizures).  Combination of OXC and LMG caused double vision, along with headaches, nausea.  Due to headaches we started her on zonisamide and took her off of lamotrigine.  Once off lamotrigine, headaches resolved.  She struggles with her emotions including cutting behaviors, anxiety, and depression; however, she refuses to accept referral to seeing a behavioral health specialist.  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    Summary 2020  -600, .  Weaned off of lamotrigine due to recurrent headaches.  Trial of zonisamide caused weight loss despite good appetite.  She did not have recurrent visual seizure until zonisamide was stopped  She was getting therapy at [facility] (Yaritza Magaña MA); not sure if it helped with depression and anxiety (she reported it was getting worse midway through the year).  We increased Effexor include 37.5mg daily.    By the end of the year, she was started on Vimpat.    Summary [id number]  -600, -100.  Discontinued zonisamide due to persistent weight loss.  Since being on lacosamide, she has not had a focal sensory / visual seizure.    She has been taking magnesium supplements.  Since being on Effexor her mood has been better.  She had not had a seizure since the addition of lacosamide, we decided  to start transitioning her from oxcarbazepine to lacosamide.    Interval history 2023  -150, -200.  She continued to have episodes of depression.  Started to wean off of oxcarbazepine.  She is in a good mood today.  She feels better now that she has significantly reduced her dose of oxcarbazepine.  She is hardly taking Zofran.  She has headaches around the time of her menstrual cycle, so it is not unusual for her to have headaches.      Special Features  Status epilepticus: No  Self Injury Seizures: No  Precipitating Factors: None    Epilepsy Risk Factors:  Abnormal pregnancy: No  Abnormal birth/: No  Abnormal Development: walked after a year, slower with language  Febrile seizures, simple: Yes  Febrile seizures, complex: No  CNS infection: No  Mental retardation: No  Cerebral palsy: No  Head injury (moderate/severe): No  CNS neoplasm: No  CNS malformation: Yes - MRI brain found ventriculomegaly, abnormal splenium of corpus callosum, and subependymal heterotopia  Neurosurgical procedure: No  Stroke: No  Alcohol abuse: No  Drug abuse: No  Family history Sz/epilepsy: No    Prior AEDs:  Levetiracetam (headaches), oxcarbazepine (continued to have visual aura), gabapentin (side effect), pregabalin (continues to have visual auras and very emotional), lamotrigine (caused more headaches), zonisamide (complaints of weight loss), lacosamide    Prior workup:       Imagin2016   MRI brain  Bilateral subependymal heterotopias in the region of the atria of the lateral ventricles with bilateral colpocephaly  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  Solitary nonspecific FLAIR hyperintensity subcortical white matter right frontal lobe    2017  MRI brain  Stable colpocephalic appearance to the ventricles, right greater than left, bowing / stretching of the posterior corpus callosum  Subtle foci nodular heterotopia along atria of both lateral ventricles more apparent on the  right  Tiny focus of increased signal in the white matter of the right frontal horn is less apparent  Hippocampi are somewhat dysmorphic bilaterally    2019  MRI alonso w/wo  Single white matter hyperintensity in the right anterior superior frontal lobe (unchanged)  Stable colpocephaly of the lateral ventricles, right greater than left  Symmetric hippocampal formations  No heterotopia was identified    EEGs:  2016  Intermittent focal slowing over the right posterior temporal region but limited to the T6 electrode, consider repeating the study    2016  Photic driving is lateralized to the left; thus, possibly relative neuronal dysfunction in the right posterior region.    2018  Frequent, right posterior temporal delta slowing and occipital sharp activity during 10 Hz photic stimulation.    Labs:  Component      Latest Ref Rng 2024   GLUCOSE      65 - 99 mg/dL 89    BUN      7 - 25 mg/dL 16    Creatinine      0.50 - 0.96 mg/dL 0.95    GFR, Calculated      > OR = 60 mL/min/1.73m2 84    SL AMB BUN/CREATININE RATIO      6 - 22 (calc) SEE NOTE:    Sodium      135 - 146 mmol/L 138    Potassium      3.5 - 5.3 mmol/L 4.2    Chloride      98 - 110 mmol/L 101    Carbon Dioxide      20 - 32 mmol/L 30    Calcium      8.6 - 10.2 mg/dL 9.4    Lacosamide      mcg/mL 5.0        PHQ-2/9 Depression Screening    Little interest or pleasure in doing things: 1 - several days  Feeling down, depressed, or hopeless: 1 - several days  Trouble falling or staying asleep, or sleeping too much: 1 - several days  Feeling tired or having little energy: 1 - several days  Poor appetite or overeatin - several days  Feeling bad about yourself - or that you are a failure or have let yourself or your family down: 0 - not at all  Trouble concentrating on things, such as reading the newspaper or watching television: 0 - not at all  Moving or speaking so slowly that other people could have noticed. Or the opposite - being so  "fidgety or restless that you have been moving around a lot more than usual: 0 - not at all  Thoughts that you would be better off dead, or of hurting yourself in some way: 0 - not at all  PHQ-9 Score: 5  PHQ-9 Interpretation: Mild depression         General exam   /82   Pulse 94   Temp 98.3 °F (36.8 °C) (Temporal)   Resp 14   Ht 5' 3\" (1.6 m)   Wt 60.4 kg (133 lb 3.2 oz)   SpO2 99%   BMI 23.60 kg/m²    Appearance: normally developed, appears well  Carotids: not assessed  Cardiovascular: regular rate and rhythm and normal heart sounds  Pulmonary: clear to auscultation  Abdominal: normal, nondistended  Extremities: no distal edema    HEENT: anicteric and moist mucus membranes / oral cavity   Fundoscopy: not assessed    Mental status  Orientation: alert and oriented to name, May 13 2024, [facility]  Fund of Knowledge: intact   Attention and Concentration: intact  Current and Remote Memory:intact  Language: spontaneous speech is normal and comprehension is intact    Cranial Nerves  CN 1: not tested  CN 2: Visual fields intact to confrontation and pupils equal round reactive to direct and consenual light   CN 3, 4, 6: EOMI, no nystagmus  CN 5:not assessed  CN 7:muscles of facial expression are symmetric  CN 8:not assessed  CN 9, 10:no dysarthria present  CN 11:symmetric SCM with head turns  CN 12:tongue is midline    Motor:  Bulk, Tone: normal bulk, normal tone  Pronation: no pronator drift  Strength: Symmetric strength of the arms and legs, no lateralizing weakness   Abnormal movements: no abnormal movements are present    Sensory:  Lighttouch: intact in all limbs  Romberg:normal    Coordination:  FNF:FNF bilaterally intact  ERICA:intact  FFM:intact  Gait/Station:normal gait and normal tandem gait    Reflexes:  Not assessed    Past Medical/Surgical History:  Patient Active Problem List   Diagnosis    Anxiety    Congenital cerebral ventriculomegaly (HCC)    Localization-related focal epilepsy with " complex partial seizures (HCC)    Moderate episode of recurrent major depressive disorder (HCC)    Periventricular heterotopia (HCC)    Visual distortions    Nausea    Chronic right-sided low back pain    Claustrophobia    Balance problem     History reviewed. No pertinent surgical history.    Past Psychiatric History:  Depression: Yes  Anxiety: Yes  Psychosis: No    Medications:    Current Outpatient Medications:     calcium polycarbophil (FIBERCON) 625 mg tablet, Take 625 mg by mouth daily Fiber gummie, Disp: , Rfl:     lacosamide (VIMPAT) 200 mg tablet, Take 1 tablet (200 mg total) by mouth every 12 (twelve) hours, Disp: 180 tablet, Rfl: 1    ondansetron (ZOFRAN) 4 mg tablet, Take 1 po q8hrs PRN nausea and vomiting, Disp: 10 tablet, Rfl: 0    venlafaxine (EFFEXOR-XR) 37.5 mg 24 hr capsule, Take 1 capsule (37.5 mg total) by mouth daily Take with one 75mg capsule, Disp: 90 capsule, Rfl: 3    venlafaxine (EFFEXOR-XR) 75 mg 24 hr capsule, Take 1 capsule (75 mg total) by mouth daily With one 37.5mg cap, Disp: 90 capsule, Rfl: 3    Allergies:  No Known Allergies    Family history:  Family History   Problem Relation Age of Onset    No Known Problems Mother     No Known Problems Father     No Known Problems Brother     Cancer Maternal Grandmother     Heart attack Paternal Grandfather      Paternal Great Grandmother started to have seizures later in life    Social History  Living situation:  lives with parents  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, works in retail; 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  Driving:  Yes, [place] '[place].   reports that she has never smoked. She has never used smokeless tobacco. She reports current alcohol use. She reports that she does not use drugs.     Review of Systems  A review of at least 12 organ/systems was obtained by the medical assistant and reviewed by me, including additional positives/negatives:  A review of at least 12  organ/systems was evaluated and there are no complaints.  Gastrointestinal:  Positive for nausea. Negative for vomiting.   Neurological:  Positive for speech difficulty and headaches.      The total amount of time spent with the patient along with pre-chart and post-chart preparation was 31 minutes on the calendar day of the date of service.  This included history taking, physical exam, review of ancillary testing, counseling provided to the patient regarding diagnosis, medications, treatment, and risk management, and other communication to the patient's providers and/or family.  Start time: 8:10AM  End time: 8:41AM

## 2024-05-24 ENCOUNTER — TELEPHONE (OUTPATIENT)
Dept: NEUROLOGY | Facility: CLINIC | Age: 29
End: 2024-05-24

## 2024-05-24 DIAGNOSIS — G40.209 LOCALIZATION-RELATED FOCAL EPILEPSY WITH COMPLEX PARTIAL SEIZURES (HCC): Primary | ICD-10-CM

## 2024-05-24 NOTE — TELEPHONE ENCOUNTER
Her lacosamide level was just evaluated less than a month ago.  Is there a concern she has about her lacosamide level?    I'll place a lacosamide level check, but would recommend that she checks a week prior to the next visit, unless there is a different concern.  Like did she experience a breakthrough seizure or is she having side effects?

## 2024-05-24 NOTE — TELEPHONE ENCOUNTER
"Recd  5/14 4:17 PM    Jenn brian, [birth year]. I was wondering if I could get blood work. I was there a couple weeks ago and Dr. Aquino did not order it, but I wanted to check my lacosamide levels. My next appointment is in November, but I just wanted to see where my levels will be. So call me back at [phone].  _______  Last visit Dr. Aquino, 5/13    Patient requesting order be entered for lacosamide level; last level was 4/30/2024 \"5.0\".  Please provide recommendation, thank you.      "

## 2024-05-28 ENCOUNTER — TELEPHONE (OUTPATIENT)
Dept: PSYCHIATRY | Facility: CLINIC | Age: 29
End: 2024-05-28

## 2024-05-28 NOTE — TELEPHONE ENCOUNTER
Contacted patient in regards to Routine Referral in attempts to verify patient's needs of services and add patient to proper wait list. LVM for patient to contact intake dept  in regards to referral at [phone]. Second attempt. Closing referral.

## 2024-05-29 NOTE — TELEPHONE ENCOUNTER
[employer] message sent to pt with questions form Dr. Aquino's message below. Pt last login today 5/29.

## 2024-05-31 NOTE — TELEPHONE ENCOUNTER
I had already placed a lacosamide blood level order on 5/24/2024 - she can use that order to check her level prior to the next visit.

## 2024-06-26 ENCOUNTER — OFFICE VISIT (OUTPATIENT)
Dept: URGENT CARE | Facility: CLINIC | Age: 29
End: 2024-06-26
Payer: MEDICARE

## 2024-06-26 VITALS
TEMPERATURE: 99.3 F | OXYGEN SATURATION: 99 % | BODY MASS INDEX: 24.66 KG/M2 | RESPIRATION RATE: 16 BRPM | HEIGHT: 62 IN | WEIGHT: 134 LBS | HEART RATE: 81 BPM

## 2024-06-26 DIAGNOSIS — H66.001 NON-RECURRENT ACUTE SUPPURATIVE OTITIS MEDIA OF RIGHT EAR WITHOUT SPONTANEOUS RUPTURE OF TYMPANIC MEMBRANE: Primary | ICD-10-CM

## 2024-06-26 PROCEDURE — 99203 OFFICE O/P NEW LOW 30 MIN: CPT

## 2024-06-26 RX ORDER — AMOXICILLIN 875 MG/1
875 TABLET, COATED ORAL 2 TIMES DAILY
Qty: 14 TABLET | Refills: 0 | Status: SHIPPED | OUTPATIENT
Start: 2024-06-26 | End: 2024-07-03

## 2024-06-26 NOTE — PATIENT INSTRUCTIONS
Take amoxicillin as prescribed.    You can take Tylenol / Motrin for ear pain.    Follow-up with primary care in 3-5 days if no improvement.    Go to the ED for any severely worsening symptoms.

## 2024-06-26 NOTE — PROGRESS NOTES
[facility]        NAME: Jenn Cunningham is a 28 y.o. female  : 1995    MRN: [id number]  DATE: 2024  TIME: 3:01 PM    Assessment and Plan   Non-recurrent acute suppurative otitis media of right ear without spontaneous rupture of tympanic membrane [H66.001]  1. Non-recurrent acute suppurative otitis media of right ear without spontaneous rupture of tympanic membrane  amoxicillin (AMOXIL) 875 mg tablet            Patient Instructions     Take amoxicillin as prescribed.    You can take Tylenol / Motrin for ear pain.    Follow-up with primary care in 3-5 days if no improvement.    Go to the ED for any severely worsening symptoms.    If tests are performed, our office will contact you with results only if changes need to made to the care plan discussed with you at the visit. [facility]      Chief Complaint     Chief Complaint   Patient presents with    Earache     Pt presents with right ear pain and diminished hearing x 3 days.           History of Present Illness       28-year-old female presenting with right ear pain and muffled hearing x 3 days. Mild intermittent headaches. No fevers/chills, dizziness/lightheadedness, ear drainage, or tinnitus. Denies recent URI.        Review of Systems   Review of Systems   Constitutional:  Negative for chills and fever.   HENT:  Positive for ear pain and hearing loss (muffled). Negative for congestion, ear discharge, facial swelling, rhinorrhea, sore throat and tinnitus.    Eyes:  Negative for discharge and redness.   Respiratory:  Negative for cough and shortness of breath.    Cardiovascular:  Negative for chest pain.   Gastrointestinal:  Negative for abdominal pain and vomiting.   Skin:  Negative for pallor and rash.   Neurological:  Positive for headaches. Negative for dizziness and light-headedness.         Current Medications       Current Outpatient Medications:     amoxicillin (AMOXIL) 875 mg tablet,  "Take 1 tablet (875 mg total) by mouth 2 (two) times a day for 7 days, Disp: 14 tablet, Rfl: 0    calcium polycarbophil (FIBERCON) 625 mg tablet, Take 625 mg by mouth daily Fiber gummie, Disp: , Rfl:     lacosamide (VIMPAT) 200 mg tablet, Take 1 tablet (200 mg total) by mouth every 12 (twelve) hours, Disp: 180 tablet, Rfl: 1    ondansetron (ZOFRAN) 4 mg tablet, Take 1 po q8hrs PRN nausea and vomiting, Disp: 10 tablet, Rfl: 0    venlafaxine (EFFEXOR-XR) 37.5 mg 24 hr capsule, Take 1 capsule (37.5 mg total) by mouth daily Take with one 75mg capsule, Disp: 90 capsule, Rfl: 3    venlafaxine (EFFEXOR-XR) 75 mg 24 hr capsule, Take 1 capsule (75 mg total) by mouth daily With one 37.5mg cap, Disp: 90 capsule, Rfl: 3    Current Allergies     Allergies as of 06/26/2024    (No Known Allergies)            The following portions of the patient's history were reviewed and updated as appropriate: allergies, current medications, past family history, past medical history, past social history, past surgical history and problem list.     Past Medical History:   Diagnosis Date    Cerebral ventriculomegaly 08/25/2016    Depression     Epilepsy (HCC)     Migraine     Seizures (HCC)     Self-injurious behavior 09/09/2019    Cutting behavior    Syncopal episodes        History reviewed. No pertinent surgical history.    Family History   Problem Relation Age of Onset    No Known Problems Mother     No Known Problems Father     No Known Problems Brother     Cancer Maternal Grandmother     Heart attack Paternal Grandfather          Medications have been verified.        Objective   Pulse 81   Temp 99.3 °F (37.4 °C)   Resp 16   Ht 5' 2\" (1.575 m)   Wt 60.8 kg (134 lb)   SpO2 99%   BMI 24.51 kg/m²        Physical Exam     Physical Exam  Vitals and nursing note reviewed.   Constitutional:       General: She is not in acute distress.     Appearance: She is not ill-appearing.   HENT:      Head: Normocephalic and atraumatic.      Right Ear: " Ear canal and external ear normal. Tympanic membrane is erythematous and bulging.      Left Ear: Tympanic membrane, ear canal and external ear normal.      Nose: Nose normal.      Mouth/Throat:      Mouth: Mucous membranes are moist.      Pharynx: Oropharynx is clear.   Eyes:      Conjunctiva/sclera: Conjunctivae normal.      Pupils: Pupils are equal, round, and reactive to light.   Cardiovascular:      Rate and Rhythm: Normal rate and regular rhythm.      Pulses: Normal pulses.      Heart sounds: Normal heart sounds.   Pulmonary:      Effort: Pulmonary effort is normal.      Breath sounds: Normal breath sounds.   Musculoskeletal:         General: Normal range of motion.      Cervical back: Normal range of motion and neck supple.   Skin:     General: Skin is warm and dry.      Capillary Refill: Capillary refill takes less than 2 seconds.   Neurological:      Mental Status: She is alert and oriented to person, place, and time.

## 2024-08-12 DIAGNOSIS — G40.209 LOCALIZATION-RELATED FOCAL EPILEPSY WITH COMPLEX PARTIAL SEIZURES (HCC): ICD-10-CM

## 2024-08-12 RX ORDER — LACOSAMIDE 200 MG/1
200 TABLET ORAL EVERY 12 HOURS SCHEDULED
Qty: 180 TABLET | Refills: 1 | Status: SHIPPED | OUTPATIENT
Start: 2024-08-12

## 2024-08-12 NOTE — TELEPHONE ENCOUNTER
Reason for call:   [x] Refill   [] Prior Auth  [] Other:     Office:   [] PCP/Provider -   [x] Specialty/Provider -  Lucila Aquino/[facility]     Medication: Vimpat    Dose/Frequency: 200 mg     Quantity: #180    Pharmacy: [place] [id number]    Does the patient have enough for 3 days?   [x] Yes   [] No - Send as HP to POD   Does not want at this time

## 2024-11-02 LAB — LACOSAMIDE SERPL-MCNC: 6.2 MCG/ML

## 2024-11-13 ENCOUNTER — OFFICE VISIT (OUTPATIENT)
Dept: NEUROLOGY | Facility: CLINIC | Age: 29
End: 2024-11-13
Payer: MEDICARE

## 2024-11-13 VITALS
OXYGEN SATURATION: 99 % | DIASTOLIC BLOOD PRESSURE: 80 MMHG | WEIGHT: 136.4 LBS | SYSTOLIC BLOOD PRESSURE: 110 MMHG | HEIGHT: 62 IN | HEART RATE: 70 BPM | TEMPERATURE: 98.1 F | RESPIRATION RATE: 18 BRPM | BODY MASS INDEX: 25.1 KG/M2

## 2024-11-13 DIAGNOSIS — R11.0 NAUSEA: ICD-10-CM

## 2024-11-13 DIAGNOSIS — F33.1 MODERATE EPISODE OF RECURRENT MAJOR DEPRESSIVE DISORDER (HCC): ICD-10-CM

## 2024-11-13 DIAGNOSIS — G40.209 LOCALIZATION-RELATED FOCAL EPILEPSY WITH COMPLEX PARTIAL SEIZURES (HCC): Primary | ICD-10-CM

## 2024-11-13 PROCEDURE — 99214 OFFICE O/P EST MOD 30 MIN: CPT | Performed by: PHYSICIAN ASSISTANT

## 2024-11-13 RX ORDER — ONDANSETRON 4 MG/1
TABLET, FILM COATED ORAL
Qty: 10 TABLET | Refills: 0 | Status: SHIPPED | OUTPATIENT
Start: 2024-11-13

## 2024-11-13 NOTE — ASSESSMENT & PLAN NOTE
Ms. Jenn Cunningham is a 29 y.o. 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  She has co-morbid anxiety and depressive disorders.  She continues to have mild depressive symptoms, which has been treated with venlafaxine.     She has remained seizure-free and is doing well on lacosamide monotherapy.  Will continue at this time.    Plan:  1 - continue with lacosamide 200mg twice a day  2 - call the office if you experience recurrence of visual seizure, if you are having issues with medication side effects, worsening depression and anxiety symptoms.

## 2024-11-13 NOTE — PROGRESS NOTES
Ambulatory Visit  Name: Jenn Cunningham      : 1995      MRN: [id number]  Encounter Provider: Erica Grimes PA-C  Encounter Date: 2024   Encounter department: 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 eJnn Cunningham is a 29 y.o. 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  She has co-morbid anxiety and depressive disorders.  She continues to have mild depressive symptoms, which has been treated with venlafaxine.     She has remained seizure-free and is doing well on lacosamide monotherapy.  Will continue at this time.    Plan:  1 - continue with lacosamide 200mg twice a day  2 - call the office if you experience recurrence of visual seizure, if you are having issues with medication side effects, worsening depression and anxiety symptoms.        Moderate episode of recurrent major depressive disorder (HCC)  Overall stable on venlafaxine, however she still has mild depressive symptoms.  I have again suggested she see a therapist and psychiatrist for management of her behavioral health disorders (history of eating disorder which she intermittently still struggles with).  She has been referred several times but has not made an appt.  She says she will call to schedule with behavioral health.    Plan:  1 - continue with Venlafaxine ER 75mg and 37.5mg one tab each daily  2 - pursue referral to Psych services       Nausea    Orders:    ondansetron (ZOFRAN) 4 mg tablet; Take 1 po q8hrs PRN nausea and vomiting    Return in 6 months         History of Present Illness   HPI    Jenn Cunningham is a 29 y.o. right handed female here for follow-up evaluation of focal seizures, visual distortions.     Interval history 2024  She has been doing well since her last visit 6 months  ago.  She denies any events concerning for seizure.  No altered awareness, LOC, visual disturbances.  She still occasionally forgets a dose of lacosamide, but has been trying to be fully compliant.  She is setting alarms, but sometimes turns off the alarm and does not take her pill.   At timing of last visit it was again recommended she see a therapist for her depression.  She says she has not done this, just cannot get herself to call.  There is no reason really.  She has been working a lot, extra hours, but outside of that she seems to be unmotivated.  She was also referred to PT and has not been motivated to call for that appt either.  Her balance is ok, there has not been any worsening, and she denies any falls.  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  She has been eating well and gaining weight, which she is happy about.    Labs 10/30/24 lacosamide level = 6.2 mcg/mL     AED/side effects/compliance:  Lacosamide 200-200     Event/Seizure semiology:  1. Lightheaded blurry vision, loss of consciousness, convulsion (Last one 8/24/2016)  2. Blurry vision, circles in a part of her visual field (left side), followed by a period of headache, no clear confusion or disorientation (described in January 2017 visit) - recurs 2-3 times a month     Women of childbearing age:  Contraception: not sexual activity  Folic acid: no - does not want at this point     Prior Epilepsy History:  Initial intake epilepsy history 9/12/2016  Review of hospital records.  Ms. Cunningham was admitted from 8/24-8/26/2016 to [facility] after a witnessed seizure. She had an MRI brain study that showed cortical malformations including heterotopias, she was subsequently started on levetiracetam 500mg twice a day. Dr. Portillo did the initial consultation; she reported that her first seizure was in June 2016, onset of  lightheadedness, blurry vision, no memory of passing out but she collapsed, the next thing she remembered was waking up in the ambulance. On the day of admission she felt lightheaded, blurry vision, veering to the sides of the aisle and collapsed, her mother witnessed some of the event but only after she fell; patient's eyes were open and mouth was moving, arms were tonically flexed and asymmetric and legs were hypertonic with repeated relaxation and tonic stiffening. Again she did not regain consciousness until she was in the ambulance. She had a history of one febrile seizure, a paternal great grandmother with seizures, head CT scan found ventriculomegaly.      Patient's history  June 2016, Ms. Cunningham recalled that she was at work and the next thing she remembered was people standing over her, she did not remember what happened before other than feeling lightheaded and blurry vision. She denied feeling exhausted, new medications, drugs, illness/fevers, and there were no changes in her usual habits. August 2016, again she does not recall much other than being told she had a seizure for less minute. Her mother reported that she did see the end of the seizure; initially Ms. Cunningham had her the arms and legs extended outward, then arms were flexed inward, convulsion lasted about 30 minute, and she was unresponsive for about 5 minutes but she was lethargic for about 15-20 minutes afterwards. Her mother had briefly turned away and did not witness the very start of the seizure. Again, there were no alcohol, drugs, medications, or illness experienced by Ms. Cunningham. She denies any history of myoclonus, staring spells, automatisms, unexplained hyperkinetic behaviors, olfactory / gustatory hallucinations, epigastric rising events, boogie vu events, visual hallucinations, unexplained nocturnal enuresis, or nocturnal tongue biting. At 17 months, she had a cold with a super high temperature, she had a seizure, deemed to have been  a simple febrile seizure.     LEV was associated with daily headaches that come and go throughout the day may last for a couple of hours at a time. These are very severe because she does not do well with pain. Headache is best describe as a throbbing sensation for a couple of hours, daily, she does not take any medication for pain because she is not sure if she can. There is no visual changes, no loss of vision, or changes in refraction.      Summary 2017  Switched LVT to OXC, better tolerated, over the course of 2017, OXC was escalated for recurrent visual distortions of the left side, circles, blurry vision (sensation of visual obscuration lasting for 2 minutes, left side swirls, if severe enough that she would temporarily not see clearly on the left visual field), lasting minutes without altered awareness or loss of consciousness. There is a headache that follows for about half an hour. These visual distortions have been sporadic once 1-5 months. We had tried gabapentin in the Spring/summer 2017 but she stopped taking it because of side effects / headaches (horrible, feeling like everything was in slow motion). Sometimes these visual distortions are triggered by stress at work. She works part time as a  4-5 hours. I wrote a letter on her behalf to get a 10-15 minutes break every 2 hours.     She reports issues with anxiety, mostly as it involves the possibility of a recurrent seizure. She feels like she has been looking over her shoulder because of her epilepsy. She tried Web[employer] but found it useless. She did get her 's license back with [employer] report of occasional visual seizures.     Summary 2018  Started with -1200. She continues to have focal visual seizures, visual disturbances of circles on the left visual field followed by a headache, no disorientation or confusion; episodic about 1-2 times a month. We had tried higher dose of OXC but she was having balance and cognitive issues. We  tried adding Lyrica, but no improvement, weaned off and started LMG.  She is driving but she has avoided the highways due to her seizures. She has enough of a warning to stop the car or pull over on local streets. Struggles with depression and anxiety.     Summary 2019  -600, -200. There was no improvement with the addition of lamotrigine to the prevention of visual auras, which continue to be infrequent events (presumed focal visual seizures). Combination of OXC and LMG caused double vision, along with headaches, nausea. Due to headaches we started her on zonisamide and took her off of lamotrigine. Once off lamotrigine, headaches resolved.  She struggles with her emotions including cutting behaviors, anxiety, and depression; however, she refuses to accept referral to seeing a behavioral health specialist. 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     Summary 2020  -600, . Weaned off of lamotrigine due to recurrent headaches. Trial of zonisamide caused weight loss despite good appetite. She did not have recurrent visual seizure until zonisamide was stopped  She was getting therapy at [facility] (Yaritza Magaña MA); not sure if it helped with depression and anxiety (she reported it was getting worse midway through the year). We increased Effexor include 37.5mg daily.   By the end of the year, she was started on Vimpat.     Summary [id number]  -600, -100. Discontinued zonisamide due to persistent weight loss. Since being on lacosamide, she has not had a focal sensory / visual seizure.   She has been taking magnesium supplements.  Since being on Effexor her mood has been better.  She had not had a seizure since the addition of lacosamide, we decided to start transitioning her from oxcarbazepine to lacosamide.     Interval history  2023  -150, -200. She continued to have episodes of depression. Started to wean off of oxcarbazepine. She is in a good mood today. She feels better now that she has significantly reduced her dose of oxcarbazepine. She is hardly taking Zofran. She has headaches around the time of her menstrual cycle, so it is not unusual for her to have headaches.    Interval history 2024  She reports there have been no seizures, visual auras, periods of confusion or loss of consciousness.  There have been no issues with her balance or shakey vision.  There have been no migraines or headache. She rarely uses Zofran for intermittent nausea when she holiday meals.  Mood have been stable, but she feels that there are days that she is down, lack of energy or lack of enjoyment. There have been no thoughts of self harm. The last time she had a counseling session in . She works 5 days a week, but when she does not work she has no energy to motivate herself.   She feels that her right side is weaker ever since her seizure. She had a fall this past weekend.        Special Features  Status epilepticus: No  Self Injury Seizures: No  Precipitating Factors: None     Epilepsy Risk Factors:  Abnormal pregnancy: No  Abnormal birth/: No  Abnormal Development: walked after a year, slower with language  Febrile seizures, simple: Yes  Febrile seizures, complex: No  CNS infection: No  Mental retardation: No  Cerebral palsy: No  Head injury (moderate/severe): No  CNS neoplasm: No  CNS malformation: Yes - MRI brain found ventriculomegaly, abnormal splenium of corpus callosum, and subependymal heterotopia  Neurosurgical procedure: No  Stroke: No  Alcohol abuse: No  Drug abuse: No  Family history Sz/epilepsy: No     Prior AEDs:  Levetiracetam (headaches), oxcarbazepine (continued to have visual aura), gabapentin (side effect), pregabalin (continues to have visual auras and very emotional), lamotrigine (caused more  headaches), zonisamide (complaints of weight loss), lacosamide     Prior workup:  [id number]   MRI brain  Bilateral subependymal heterotopias in the region of the atria of the lateral ventricles with bilateral colpocephaly  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  Solitary nonspecific FLAIR hyperintensity subcortical white matter right frontal lobe     2017  MRI brain  Stable colpocephalic appearance to the ventricles, right greater than left, bowing / stretching of the posterior corpus callosum  Subtle foci nodular heterotopia along atria of both lateral ventricles more apparent on the right  Tiny focus of increased signal in the white matter of the right frontal horn is less apparent  Hippocampi are somewhat dysmorphic bilaterally     2019  MRI alonso w/wo  Single white matter hyperintensity in the right anterior superior frontal lobe (unchanged)  Stable colpocephaly of the lateral ventricles, right greater than left  Symmetric hippocampal formations  No heterotopia was identified     EEGs:  2016  Intermittent focal slowing over the right posterior temporal region but limited to the T6 electrode, consider repeating the study     2016  Photic driving is lateralized to the left; thus, possibly relative neuronal dysfunction in the right posterior region.     2018  Frequent, right posterior temporal delta slowing and occipital sharp activity during 10 Hz photic stimulation.    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  Driving:  Yes, [place] 's license.   reports that she has never smoked. She has never used smokeless tobacco. She reports current alcohol use. She reports that she does not use drugs.     Review of Systems   Constitutional:  "Negative.  Negative for fatigue and fever.   HENT: Negative.  Negative for hearing loss, tinnitus and trouble swallowing.    Eyes:  Negative for photophobia, pain and visual disturbance.   Respiratory: Negative.  Negative for cough and shortness of breath.    Cardiovascular: Negative.  Negative for chest pain and palpitations.   Gastrointestinal:  Negative for constipation, diarrhea, nausea and vomiting.   Endocrine: Negative.    Genitourinary: Negative.  Negative for difficulty urinating and urgency.   Musculoskeletal: Negative.  Negative for back pain, gait problem and neck pain.   Skin: Negative.  Negative for rash.   Neurological: Negative.  Negative for dizziness, tremors, seizures, syncope, speech difficulty, weakness, numbness and headaches.   Hematological: Negative.    Psychiatric/Behavioral:  Negative for decreased concentration and sleep disturbance. The patient is not nervous/anxious.      I have personally reviewed the MA's review of systems and made changes as necessary.    Current Outpatient Medications on File Prior to Visit   Medication Sig Dispense Refill    calcium polycarbophil (FIBERCON) 625 mg tablet Take 625 mg by mouth daily Fiber gummie      lacosamide (VIMPAT) 200 mg tablet Take 1 tablet (200 mg total) by mouth every 12 (twelve) hours 180 tablet 1    venlafaxine (EFFEXOR-XR) 37.5 mg 24 hr capsule Take 1 capsule (37.5 mg total) by mouth daily Take with one 75mg capsule 90 capsule 3    venlafaxine (EFFEXOR-XR) 75 mg 24 hr capsule Take 1 capsule (75 mg total) by mouth daily With one 37.5mg cap 90 capsule 3    [DISCONTINUED] ondansetron (ZOFRAN) 4 mg tablet Take 1 po q8hrs PRN nausea and vomiting 10 tablet 0     No current facility-administered medications on file prior to visit.      Objective     /80 (BP Location: Left arm, Patient Position: Sitting, Cuff Size: Large)   Pulse 70   Temp 98.1 °F (36.7 °C) (Temporal)   Resp 18   Ht 5' 2\" (1.575 m)   Wt 61.9 kg (136 lb 6.4 oz)   SpO2 " 99%   BMI 24.95 kg/m²     Physical Exam  Constitutional:       Appearance: Normal appearance.   Eyes:      Extraocular Movements: EOM normal.      Pupils: Pupils are equal, round, and reactive to light.   Neurological:      Mental Status: She is alert and oriented to person, place, and time.      Motor: Motor strength is normal.     Coordination: Finger-Nose-Finger Test and Heel to Shin Test normal.      Gait: Gait is intact.      Deep Tendon Reflexes:      Reflex Scores:       Bicep reflexes are 2+ on the right side and 2+ on the left side.       Brachioradialis reflexes are 2+ on the right side and 2+ on the left side.       Patellar reflexes are 2+ on the right side and 2+ on the left side.       Achilles reflexes are 2+ on the right side and 2+ on the left side.  Psychiatric:         Mood and Affect: Mood normal.         Speech: Speech normal.         Behavior: Behavior normal.       Neurologic Exam     Mental Status   Oriented to person, place, and time.   Attention: normal. Concentration: normal.   Speech: speech is normal   Level of consciousness: alert  Normal comprehension.     Cranial Nerves     CN II   Visual fields full to confrontation.     CN III, IV, VI   Pupils are equal, round, and reactive to light.  Extraocular motions are normal.     CN V   Facial sensation intact.     CN VII   Facial expression full, symmetric.     CN VIII   CN VIII normal.     CN IX, X   CN IX normal.   CN X normal.     CN XI   CN XI normal.     CN XII   CN XII normal.     Motor Exam     Strength   Strength 5/5 throughout.     Sensory Exam   Light touch normal.     Gait, Coordination, and Reflexes     Gait  Gait: normal    Coordination   Finger to nose coordination: normal  Heel to shin coordination: normal    Reflexes   Right brachioradialis: 2+  Left brachioradialis: 2+  Right biceps: 2+  Left biceps: 2+  Right patellar: 2+  Left patellar: 2+  Right achilles: 2+  Left achilles: 2+

## 2024-11-13 NOTE — ASSESSMENT & PLAN NOTE
Overall stable on venlafaxine, however she still has mild depressive symptoms.  I have again suggested she see a therapist and psychiatrist for management of her behavioral health disorders (history of eating disorder which she intermittently still struggles with).  She has been referred several times but has not made an appt.  She says she will call to schedule with behavioral health.    Plan:  1 - continue with Venlafaxine ER 75mg and 37.5mg one tab each daily  2 - pursue referral to Psych services

## 2024-11-13 NOTE — PATIENT INSTRUCTIONS
Plan:   Focal epilepsy  1 - continue with lacosamide 200mg twice a day  2 - call the office if you experience recurrence of visual seizure, if you are having issues with medication side effects, worsening depression and anxiety symptoms.      Depression  1 - continue with Venlafaxine ER 75mg and 37.5mg one tab each daily  2 - suggest making an appointment with a therapist     Feeling off balance  1 - make an appt with physical therapy, referral placed last visit      Follow-up in 6 months

## 2025-01-11 ENCOUNTER — OFFICE VISIT (OUTPATIENT)
Dept: URGENT CARE | Facility: CLINIC | Age: 30
End: 2025-01-11
Payer: MEDICARE

## 2025-01-11 VITALS
HEART RATE: 94 BPM | RESPIRATION RATE: 18 BRPM | OXYGEN SATURATION: 97 % | TEMPERATURE: 98.9 F | DIASTOLIC BLOOD PRESSURE: 70 MMHG | SYSTOLIC BLOOD PRESSURE: 132 MMHG

## 2025-01-11 DIAGNOSIS — J02.9 SORE THROAT: Primary | ICD-10-CM

## 2025-01-11 DIAGNOSIS — H61.23 BILATERAL IMPACTED CERUMEN: ICD-10-CM

## 2025-01-11 DIAGNOSIS — J06.9 UPPER RESPIRATORY TRACT INFECTION, UNSPECIFIED TYPE: ICD-10-CM

## 2025-01-11 LAB — S PYO AG THROAT QL: NEGATIVE

## 2025-01-11 PROCEDURE — 87880 STREP A ASSAY W/OPTIC: CPT | Performed by: NURSE PRACTITIONER

## 2025-01-11 PROCEDURE — 87070 CULTURE OTHR SPECIMN AEROBIC: CPT | Performed by: NURSE PRACTITIONER

## 2025-01-11 PROCEDURE — 69209 REMOVE IMPACTED EAR WAX UNI: CPT | Performed by: NURSE PRACTITIONER

## 2025-01-11 PROCEDURE — 99213 OFFICE O/P EST LOW 20 MIN: CPT | Performed by: NURSE PRACTITIONER

## 2025-01-11 RX ORDER — BROMPHENIRAMINE MALEATE, PSEUDOEPHEDRINE HYDROCHLORIDE, AND DEXTROMETHORPHAN HYDROBROMIDE 2; 30; 10 MG/5ML; MG/5ML; MG/5ML
10 SYRUP ORAL 3 TIMES DAILY
Qty: 150 ML | Refills: 0 | Status: SHIPPED | OUTPATIENT
Start: 2025-01-11

## 2025-01-11 NOTE — PROGRESS NOTES
[facility]        NAME: Jenn Cunningham is a 29 y.o. female  : 1995    MRN: [id number]  DATE: 2025  TIME: 9:26 AM    Assessment and Plan   Sore throat [J02.9]  1. Sore throat  POCT rapid ANTIGEN strepA    Throat culture      2. Upper respiratory tract infection, unspecified type  brompheniramine-pseudoephedrine-DM 30-2-10 MG/5ML syrup      3. Bilateral impacted cerumen  Ear cerumen removal            Patient Instructions       Tylenol prn for aches and pain  Take med as prescribed for cold symptoms  Cerumen removed from both ears  Follow up with PCP in 3-5 days.  Proceed to  ER if symptoms worsen.    If tests have been performed at [facility], our office will contact you with results if changes need to be made to the care plan discussed with you at the visit.  You can review your full results on [facility].    Chief Complaint     Chief Complaint   Patient presents with    Cough     Patient Wednesday with a cough and sore throat, as well as some pain in her hips and body aches.          History of Present Illness       HPI  Reports cough and sore throat that started 3 days ago. Also some bodyaches and hip pain. Chronic Hx of hip pain, intermittent, flares up with cold weather. Did not take any meds for the cough. Used cough drops. Last night was the worse of the cough. Hardly got any sleep bc of it.        Review of Systems   Review of Systems   Constitutional:  Negative for fatigue.   HENT:  Positive for rhinorrhea and sore throat. Negative for congestion and ear pain.    Respiratory:  Positive for cough. Negative for wheezing.    Cardiovascular:  Negative for chest pain.   Gastrointestinal:  Negative for abdominal pain, diarrhea and vomiting.   Musculoskeletal:  Positive for myalgias.   Neurological:  Negative for headaches.         Current Medications       Current Outpatient Medications:     brompheniramine-pseudoephedrine-DM 30-2-10 MG/5ML syrup, Take 10 mL by mouth 3 (three)  times a day, Disp: 150 mL, Rfl: 0    calcium polycarbophil (FIBERCON) 625 mg tablet, Take 625 mg by mouth daily Fiber gummie, Disp: , Rfl:     lacosamide (VIMPAT) 200 mg tablet, Take 1 tablet (200 mg total) by mouth every 12 (twelve) hours, Disp: 180 tablet, Rfl: 1    ondansetron (ZOFRAN) 4 mg tablet, Take 1 po q8hrs PRN nausea and vomiting, Disp: 10 tablet, Rfl: 0    venlafaxine (EFFEXOR-XR) 37.5 mg 24 hr capsule, Take 1 capsule (37.5 mg total) by mouth daily Take with one 75mg capsule, Disp: 90 capsule, Rfl: 3    venlafaxine (EFFEXOR-XR) 75 mg 24 hr capsule, Take 1 capsule (75 mg total) by mouth daily With one 37.5mg cap, Disp: 90 capsule, Rfl: 3    Current Allergies     Allergies as of 01/11/2025    (No Known Allergies)            The following portions of the patient's history were reviewed and updated as appropriate: allergies, current medications, past family history, past medical history, past social history, past surgical history and problem list.     Past Medical History:   Diagnosis Date    Cerebral ventriculomegaly 08/25/2016    Depression     Epilepsy (HCC)     Migraine     Seizures (HCC)     Self-injurious behavior 09/09/2019    Cutting behavior    Syncopal episodes        History reviewed. No pertinent surgical history.    Family History   Problem Relation Age of Onset    No Known Problems Mother     No Known Problems Father     No Known Problems Brother     Cancer Maternal Grandmother     Heart attack Paternal Grandfather          Medications have been verified.        Objective   /70   Pulse 94   Temp 98.9 °F (37.2 °C)   Resp 18   SpO2 97%   No LMP recorded.       Physical Exam     Physical Exam  Constitutional:       Appearance: She is not ill-appearing.   HENT:      Head:      Comments: NTTP of the sinuses     Right Ear: Tympanic membrane normal.      Left Ear: Tympanic membrane normal.      Nose: Rhinorrhea present.      Mouth/Throat:      Pharynx: No posterior oropharyngeal erythema.    Cardiovascular:      Rate and Rhythm: Regular rhythm.      Heart sounds: Normal heart sounds.   Pulmonary:      Effort: Pulmonary effort is normal.      Breath sounds: Normal breath sounds.   Lymphadenopathy:      Cervical: No cervical adenopathy.           Ear cerumen removal    Date/Time: 1/11/2025 9:00 AM    Performed by: MARGOT Doshi  Authorized by: MARGOT Doshi  Universal Protocol:  procedure performed by consultantConsent: Verbal consent obtained.  Consent given by: patient  Timeout called at: 1/11/2025 9:20 AM.  Patient understanding: patient states understanding of the procedure being performed  Patient consent: the patient's understanding of the procedure matches consent given  Patient identity confirmed: verbally with patient    Patient location:  Clinic  Indications / Diagnosis:  Cerumen impaction  Procedure details:     Local anesthetic:  None    Location:  Both ears    Procedure type: irrigation only      Approach:  Natural orifice    Visualization (free text):  Large amount of cerumen in both ears    Equipment used:  Elephant ear equipment  Sedation:     Sedation type: none.  Post-procedure details:     Complication:  None    Hearing quality:  Normal    Patient tolerance of procedure:  Tolerated well, no immediate complications

## 2025-01-13 ENCOUNTER — OFFICE VISIT (OUTPATIENT)
Dept: URGENT CARE | Facility: CLINIC | Age: 30
End: 2025-01-13
Payer: MEDICARE

## 2025-01-13 VITALS
SYSTOLIC BLOOD PRESSURE: 122 MMHG | OXYGEN SATURATION: 99 % | DIASTOLIC BLOOD PRESSURE: 78 MMHG | BODY MASS INDEX: 23.7 KG/M2 | WEIGHT: 129.6 LBS | RESPIRATION RATE: 20 BRPM | HEART RATE: 135 BPM | TEMPERATURE: 100.9 F

## 2025-01-13 DIAGNOSIS — J06.9 UPPER RESPIRATORY TRACT INFECTION, UNSPECIFIED TYPE: Primary | ICD-10-CM

## 2025-01-13 DIAGNOSIS — F41.9 ANXIETY: ICD-10-CM

## 2025-01-13 PROCEDURE — 99213 OFFICE O/P EST LOW 20 MIN: CPT | Performed by: PREVENTIVE MEDICINE

## 2025-01-13 NOTE — PROGRESS NOTES
"  [facility]        NAME: Jenn Cunningham is a 29 y.o. female  : 1995    MRN: [id number]  DATE: 2025  TIME: 6:24 PM    Assessment and Plan   Upper respiratory tract infection, unspecified type [J06.9]  1. Upper respiratory tract infection, unspecified type        2. Anxiety              Patient Instructions       Follow up with PCP in 3-5 days.  Proceed to  ER if symptoms worsen.    If tests have been performed at [facility], our office will contact you with results if changes need to be made to the care plan discussed with you at the visit.  You can [facility]    Chief Complaint     Chief Complaint   Patient presents with    Cough     Seen here Saturday for same, reports today with worsening cough, per patient \"I just can't take it\" reports increased fatigue and weakness, denies measured fever, reports body aches and chills         History of Present Illness       She is here because she still does not feel well.  Cough congestion fever fatigue.  While in the room she started crying hysterically.    Cough        Review of Systems   Review of Systems   Respiratory:  Positive for cough.    Psychiatric/Behavioral:  Positive for agitation. The patient is nervous/anxious.          Current Medications       Current Outpatient Medications:     brompheniramine-pseudoephedrine-DM 30-2-10 MG/5ML syrup, Take 10 mL by mouth 3 (three) times a day, Disp: 150 mL, Rfl: 0    calcium polycarbophil (FIBERCON) 625 mg tablet, Take 625 mg by mouth daily Fiber gummie, Disp: , Rfl:     lacosamide (VIMPAT) 200 mg tablet, Take 1 tablet (200 mg total) by mouth every 12 (twelve) hours, Disp: 180 tablet, Rfl: 1    ondansetron (ZOFRAN) 4 mg tablet, Take 1 po q8hrs PRN nausea and vomiting, Disp: 10 tablet, Rfl: 0    venlafaxine (EFFEXOR-XR) 37.5 mg 24 hr capsule, Take 1 capsule (37.5 mg total) by mouth daily Take with one 75mg capsule, Disp: 90 capsule, Rfl: 3    venlafaxine " (EFFEXOR-XR) 75 mg 24 hr capsule, Take 1 capsule (75 mg total) by mouth daily With one 37.5mg cap, Disp: 90 capsule, Rfl: 3    Current Allergies     Allergies as of 01/13/2025    (No Known Allergies)            The following portions of the patient's history were reviewed and updated as appropriate: allergies, current medications, past family history, past medical history, past social history, past surgical history and problem list.     Past Medical History:   Diagnosis Date    Cerebral ventriculomegaly 08/25/2016    Depression     Epilepsy (HCC)     Migraine     Seizures (HCC)     Self-injurious behavior 09/09/2019    Cutting behavior    Syncopal episodes        History reviewed. No pertinent surgical history.    Family History   Problem Relation Age of Onset    No Known Problems Mother     No Known Problems Father     No Known Problems Brother     Cancer Maternal Grandmother     Heart attack Paternal Grandfather          Medications have been verified.        Objective   /78 (BP Location: Right arm, Patient Position: Sitting)   Pulse (!) 135   Temp (!) 100.9 °F (38.3 °C) (Tympanic)   Resp 20   Wt 58.8 kg (129 lb 9.6 oz)   LMP  (Within Weeks)   SpO2 99%   BMI 23.70 kg/m²   No LMP recorded (within weeks).       Physical Exam     Physical Exam  Pulmonary:      Breath sounds: Normal breath sounds. No stridor. No wheezing, rhonchi or rales.       Note, I called her mother and asked her to come pick her up so she did not drive.

## 2025-01-13 NOTE — PATIENT INSTRUCTIONS
Continue medications that you have been using for head cold and congestionDrink at least 6 or 8 glasses of water or juice a day.  Using a vaporizer or humidifier in the bedroom will be very helpful.  Motrin or Aleve or aspirin for fever chills or aches.  Robitussin DM or NyQuil for cough.  Afrin nasal spray for facial and head congestion. Inhaling steam coming up from the sink will be very helpful.  If symptoms are getting worse over the next 4-7 days you must be rechecked.

## 2025-01-15 LAB — BACTERIA THROAT CULT: NORMAL

## 2025-01-22 ENCOUNTER — OFFICE VISIT (OUTPATIENT)
Dept: URGENT CARE | Facility: CLINIC | Age: 30
End: 2025-01-22
Payer: MEDICARE

## 2025-01-22 VITALS
TEMPERATURE: 98.3 F | RESPIRATION RATE: 16 BRPM | SYSTOLIC BLOOD PRESSURE: 120 MMHG | OXYGEN SATURATION: 100 % | HEART RATE: 106 BPM | DIASTOLIC BLOOD PRESSURE: 70 MMHG

## 2025-01-22 DIAGNOSIS — H91.93 HEARING DECREASED, BILATERAL: Primary | ICD-10-CM

## 2025-01-22 PROCEDURE — 99213 OFFICE O/P EST LOW 20 MIN: CPT | Performed by: FAMILY MEDICINE

## 2025-01-22 NOTE — PROGRESS NOTES
[facility]        NAME: Jenn Cunningham is a 29 y.o. female  : 1995    MRN: [id number]  DATE: 2025  TIME: 10:58 AM    Assessment and Plan   Hearing decreased, bilateral [H91.93]  1. Hearing decreased, bilateral  Ambulatory Referral to Otolaryngology            Patient Instructions       Follow up with PCP in 3-5 days.  Proceed to  ER if symptoms worsen.    If tests have been performed at [facility], our office will contact you with results if changes need to be made to the care plan discussed with you at the visit.  [facility]    Chief Complaint     Chief Complaint   Patient presents with    Ear Fullness     Patient with b/l clogged ears x1 week.          History of Present Illness       29-year-old female presenting with fullness sensation in both her ears.  She feels as if she is having muffled hearing on both sides.  This comes after recently recovering from the flu.  Patient becomes frustrated as she is unable to further explain her symptoms further.    Ear Fullness         Review of Systems   Review of Systems   Constitutional: Negative.    HENT: Negative.          Muffled hearing     Eyes: Negative.    Respiratory: Negative.     Cardiovascular: Negative.    Gastrointestinal: Negative.    Genitourinary: Negative.    Skin: Negative.    Allergic/Immunologic: Negative.    Neurological: Negative.    Hematological: Negative.    Psychiatric/Behavioral: Negative.           Current Medications       Current Outpatient Medications:     brompheniramine-pseudoephedrine-DM 30-2-10 MG/5ML syrup, Take 10 mL by mouth 3 (three) times a day, Disp: 150 mL, Rfl: 0    calcium polycarbophil (FIBERCON) 625 mg tablet, Take 625 mg by mouth daily Fiber gummie, Disp: , Rfl:     lacosamide (VIMPAT) 200 mg tablet, Take 1 tablet (200 mg total) by mouth every 12 (twelve) hours, Disp: 180 tablet, Rfl: 1    ondansetron (ZOFRAN) 4 mg tablet, Take 1 po q8hrs PRN nausea  and vomiting, Disp: 10 tablet, Rfl: 0    venlafaxine (EFFEXOR-XR) 37.5 mg 24 hr capsule, Take 1 capsule (37.5 mg total) by mouth daily Take with one 75mg capsule, Disp: 90 capsule, Rfl: 3    venlafaxine (EFFEXOR-XR) 75 mg 24 hr capsule, Take 1 capsule (75 mg total) by mouth daily With one 37.5mg cap, Disp: 90 capsule, Rfl: 3    Current Allergies     Allergies as of 01/22/2025    (No Known Allergies)            The following portions of the patient's history were reviewed and updated as appropriate: allergies, current medications, past family history, past medical history, past social history, past surgical history and problem list.     Past Medical History:   Diagnosis Date    Cerebral ventriculomegaly 08/25/2016    Depression     Epilepsy (HCC)     Migraine     Seizures (HCC)     Self-injurious behavior 09/09/2019    Cutting behavior    Syncopal episodes        No past surgical history on file.    Family History   Problem Relation Age of Onset    No Known Problems Mother     No Known Problems Father     No Known Problems Brother     Cancer Maternal Grandmother     Heart attack Paternal Grandfather          Medications have been verified.        Objective   /70   Pulse (!) 106   Temp 98.3 °F (36.8 °C)   Resp 16   SpO2 100%   No LMP recorded.       Physical Exam     Physical Exam  Vitals and nursing note reviewed.   Constitutional:       Appearance: She is well-developed.   HENT:      Head: Normocephalic.      Nose: Nose normal.   Eyes:      Pupils: Pupils are equal, round, and reactive to light.   Cardiovascular:      Rate and Rhythm: Normal rate and regular rhythm.   Pulmonary:      Effort: Pulmonary effort is normal.   Abdominal:      General: Abdomen is flat.   Musculoskeletal:         General: Normal range of motion.      Cervical back: Normal range of motion.   Skin:     General: Skin is warm and dry.   Neurological:      Mental Status: She is alert and oriented to person, place, and time.

## 2025-05-13 ENCOUNTER — OFFICE VISIT (OUTPATIENT)
Dept: NEUROLOGY | Facility: CLINIC | Age: 30
End: 2025-05-13
Payer: MEDICARE

## 2025-05-13 VITALS
WEIGHT: 130.2 LBS | HEIGHT: 62 IN | BODY MASS INDEX: 23.96 KG/M2 | SYSTOLIC BLOOD PRESSURE: 128 MMHG | TEMPERATURE: 97.4 F | RESPIRATION RATE: 18 BRPM | OXYGEN SATURATION: 98 % | HEART RATE: 68 BPM | DIASTOLIC BLOOD PRESSURE: 78 MMHG

## 2025-05-13 DIAGNOSIS — G40.209 LOCALIZATION-RELATED FOCAL EPILEPSY WITH COMPLEX PARTIAL SEIZURES (HCC): Primary | ICD-10-CM

## 2025-05-13 DIAGNOSIS — F33.1 MODERATE EPISODE OF RECURRENT MAJOR DEPRESSIVE DISORDER (HCC): ICD-10-CM

## 2025-05-13 DIAGNOSIS — R11.0 NAUSEA: ICD-10-CM

## 2025-05-13 PROCEDURE — 99214 OFFICE O/P EST MOD 30 MIN: CPT | Performed by: PHYSICIAN ASSISTANT

## 2025-05-13 RX ORDER — LACOSAMIDE 50 MG/1
50 TABLET ORAL EVERY 12 HOURS SCHEDULED
Qty: 84 TABLET | Refills: 0 | Status: SHIPPED | OUTPATIENT
Start: 2025-05-13

## 2025-05-13 RX ORDER — VENLAFAXINE HYDROCHLORIDE 37.5 MG/1
37.5 CAPSULE, EXTENDED RELEASE ORAL DAILY
Qty: 90 CAPSULE | Refills: 3 | Status: SHIPPED | OUTPATIENT
Start: 2025-05-13

## 2025-05-13 RX ORDER — ONDANSETRON 4 MG/1
TABLET, FILM COATED ORAL
Qty: 10 TABLET | Refills: 0 | Status: SHIPPED | OUTPATIENT
Start: 2025-05-13

## 2025-05-13 NOTE — ASSESSMENT & PLAN NOTE
"Patient with longstanding mild depression, anxiety, as well as history of eating disorder.  We have advised many times over the past several years that she establish with behavioral health, both psychiatry and psychology.  She has been maintained on venlafaxine through our office while awaiting behavioral health management.    Unfortunately, she also discontinued her venlafaxine several months ago.  She feels she needs a \"low dose\".  Was previously taking 112.5mg daily, but wants to restart the \"low dose\" for now.  Will start 37.5mg and can increase if needed.    We again discussed importance of establishing care with behavioral health team.  I suggested trying [facility] to find a therapist, as well as calling her insurance for a list of covered psychiatrists.  If needing assistance, can ask our social work team to help.    Plan:  - re-start venlafaxine ER 37.5mg daily  - again advise establishing care with a psychologist and psychiatrist for ongoing management of her depression, anxiety and eating disorder    Orders:    venlafaxine (EFFEXOR-XR) 37.5 mg 24 hr capsule; Take 1 capsule (37.5 mg total) by mouth daily    "

## 2025-05-13 NOTE — PROGRESS NOTES
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 Jenn Cunningham is a 29 y.o. 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  Her seizures have come under control with lacosamide, with less side effects. She has co-morbid anxiety and depressive disorders.     She tells me she stopped all of her medications several months ago.  She was sick with URI with sore throat in January and decided to stop all of her meds because she was not sure if it would interfere with medications prescribed for URI, and also had pain with swallowing pills.  She never restarted any medications because she was “not sure how to”, but never contacted our office about this.  It sounds like she may have had 1 episode concerning for possible seizure about a month ago.  There was no LOC.    We discussed she should not stop her anti-seizure medications unless advised by this office.  We discussed if she has any concerns regarding medication interactions, she should contact us right away.  She questioned staying off AEDs, but we discussed this is not recommended.  She had done very well with lacosamide, therefore will re-titrate her back to the prior dose.       Plan:  Restart lacosamide using 50mg tabs  - take 1 tab twice a day x 1 week, then 2 tabs twice a day x 1 week, then 3 tabs twice a day x 1 week, then change to 200mg tabs take 1 tab twice a day  - after titration continue lacosamide 200mg twice a day  - get blood work done prior to next appt (lacosamide level, BMP)  - do not stop taking your medications unless instructed by our office.  Always call if there are any questions regarding prescribed medications  - call if there are any  "seizures    Orders:    lacosamide (VIMPAT) 50 mg; Take 1 tablet (50 mg total) by mouth every 12 (twelve) hours Take 1 po BID x 1 week, then 2 po BID x 1 week, then 3 po BID x 1 week, then change to 200mg tabs    Basic metabolic panel; Future    Lacosamide; Future    Moderate episode of recurrent major depressive disorder (HCC)  Patient with longstanding mild depression, anxiety, as well as history of eating disorder.  We have advised many times over the past several years that she establish with behavioral health, both psychiatry and psychology.  She has been maintained on venlafaxine through our office while awaiting behavioral health management.    Unfortunately, she also discontinued her venlafaxine several months ago.  She feels she needs a \"low dose\".  Was previously taking 112.5mg daily, but wants to restart the \"low dose\" for now.  Will start 37.5mg and can increase if needed.    We again discussed importance of establishing care with behavioral health team.  I suggested trying [email or web address] to find a therapist, as well as calling her insurance for a list of covered psychiatrists.  If needing assistance, can ask our social work team to help.    Plan:  - re-start venlafaxine ER 37.5mg daily  - again advise establishing care with a psychologist and psychiatrist for ongoing management of her depression, anxiety and eating disorder    Orders:    venlafaxine (EFFEXOR-XR) 37.5 mg 24 hr capsule; Take 1 capsule (37.5 mg total) by mouth daily    Nausea    Orders:    ondansetron (ZOFRAN) 4 mg tablet; Take 1 po q8hrs PRN nausea and vomiting    Return in 6 months or sooner if needed        History of Present Illness   HPI   Jenn Cunningham is a 29 y.o. right handed female here for follow-up evaluation of focal seizures, visual distortions.      Interval history 5/13/2025  Jenn reports she has overall been doing well.  Unfortunately, she tells me she stopped all of her prescribed medications in January.  She was " "sick with URI and sore throat, was having pain with swallowing, and also concerned that her prescribed meds would interact with medications prescribed for her URI.  So, she just stopped taking everything.  I asked why she did not restart when she was feeling better and she said “I didn't know how to restart”.  I asked why she did not call the office to let us know about stopping her meds and ask how to restart and she became a bit flustered and just said “I don't know…I knew I was going to get it today!”.  She believes she may have had 1 “episode” about a month ago.  She just zoned out, felt funny, took her a while to get back to her normal speed and normal self.  No LOC, no convulsive activity.   She feels her depression is still there but \"not bad\", even with being off venlafaxine.  She would like to re-start a low dose.  She tried to get in with a psychologist but then they did not take her insurance.  She has not pursued finding a psychologist or psychiatrist any further.     AED/side effects/compliance:  Lacosamide 200-200 - NOT CURRENTLY TAKING      Event/Seizure semiology:  1. Lightheaded blurry vision, loss of consciousness, convulsion (Last one 8/24/2016)  2. Blurry vision, circles in a part of her visual field (left side), followed by a period of headache, no clear confusion or disorientation (described in January 2017 visit) - recurs 2-3 times a month     Women of childbearing age:  Contraception: not sexual activity  Folic acid: no - does not want at this point     Prior Epilepsy History:  Initial intake epilepsy history 9/12/2016  Review of hospital records.  Ms. Cunningham was admitted from 8/24-8/26/2016 to [facility] after a witnessed seizure. She had an MRI brain study that showed cortical malformations including heterotopias, she was subsequently started on levetiracetam 500mg twice a day. Dr. Portillo did the initial consultation; she reported that her first seizure was in June 2016, onset of " lightheadedness, blurry vision, no memory of passing out but she collapsed, the next thing she remembered was waking up in the ambulance. On the day of admission she felt lightheaded, blurry vision, veering to the sides of the aisle and collapsed, her mother witnessed some of the event but only after she fell; patient's eyes were open and mouth was moving, arms were tonically flexed and asymmetric and legs were hypertonic with repeated relaxation and tonic stiffening. Again she did not regain consciousness until she was in the ambulance. She had a history of one febrile seizure, a paternal great grandmother with seizures, head CT scan found ventriculomegaly.      Patient's history  June 2016, Ms. Cunningham recalled that she was at work and the next thing she remembered was people standing over her, she did not remember what happened before other than feeling lightheaded and blurry vision. She denied feeling exhausted, new medications, drugs, illness/fevers, and there were no changes in her usual habits. August 2016, again she does not recall much other than being told she had a seizure for less minute. Her mother reported that she did see the end of the seizure; initially Ms. Cunningham had her the arms and legs extended outward, then arms were flexed inward, convulsion lasted about 30 minute, and she was unresponsive for about 5 minutes but she was lethargic for about 15-20 minutes afterwards. Her mother had briefly turned away and did not witness the very start of the seizure. Again, there were no alcohol, drugs, medications, or illness experienced by Ms. Cunningham. She denies any history of myoclonus, staring spells, automatisms, unexplained hyperkinetic behaviors, olfactory / gustatory hallucinations, epigastric rising events, boogie vu events, visual hallucinations, unexplained nocturnal enuresis, or nocturnal tongue biting. At 17 months, she had a cold with a super high temperature, she had a seizure, deemed to have been  a simple febrile seizure.     LEV was associated with daily headaches that come and go throughout the day may last for a couple of hours at a time. These are very severe because she does not do well with pain. Headache is best describe as a throbbing sensation for a couple of hours, daily, she does not take any medication for pain because she is not sure if she can. There is no visual changes, no loss of vision, or changes in refraction.      Summary 2017  Switched LVT to OXC, better tolerated, over the course of 2017, OXC was escalated for recurrent visual distortions of the left side, circles, blurry vision (sensation of visual obscuration lasting for 2 minutes, left side swirls, if severe enough that she would temporarily not see clearly on the left visual field), lasting minutes without altered awareness or loss of consciousness. There is a headache that follows for about half an hour. These visual distortions have been sporadic once 1-5 months. We had tried gabapentin in the Spring/summer 2017 but she stopped taking it because of side effects / headaches (horrible, feeling like everything was in slow motion). Sometimes these visual distortions are triggered by stress at work. She works part time as a  4-5 hours. I wrote a letter on her behalf to get a 10-15 minutes break every 2 hours.     She reports issues with anxiety, mostly as it involves the possibility of a recurrent seizure. She feels like she has been looking over her shoulder because of her epilepsy. She tried [employer] but found it useless. She did get her 's license back with [employer] report of occasional visual seizures.     Summary 2018  Started with -1200. She continues to have focal visual seizures, visual disturbances of circles on the left visual field followed by a headache, no disorientation or confusion; episodic about 1-2 times a month. We had tried higher dose of OXC but she was having balance and cognitive issues. We  tried adding Lyrica, but no improvement, weaned off and started LMG.  She is driving but she has avoided the highways due to her seizures. She has enough of a warning to stop the car or pull over on local streets. Struggles with depression and anxiety.     Summary 2019  -600, -200. There was no improvement with the addition of lamotrigine to the prevention of visual auras, which continue to be infrequent events (presumed focal visual seizures). Combination of OXC and LMG caused double vision, along with headaches, nausea. Due to headaches we started her on zonisamide and took her off of lamotrigine. Once off lamotrigine, headaches resolved.  She struggles with her emotions including cutting behaviors, anxiety, and depression; however, she refuses to accept referral to seeing a behavioral health specialist. 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     Summary 2020  -600, . Weaned off of lamotrigine due to recurrent headaches. Trial of zonisamide caused weight loss despite good appetite. She did not have recurrent visual seizure until zonisamide was stopped  She was getting therapy at [facility] (Yaritza Magaña MA); not sure if it helped with depression and anxiety (she reported it was getting worse midway through the year). We increased Effexor include 37.5mg daily.   By the end of the year, she was started on Vimpat.     Summary [id number]  -600, -100. Discontinued zonisamide due to persistent weight loss. Since being on lacosamide, she has not had a focal sensory / visual seizure.   She has been taking magnesium supplements.  Since being on Effexor her mood has been better.  She had not had a seizure since the addition of lacosamide, we decided to start transitioning her from oxcarbazepine to lacosamide.     Interval history  5/12/2023  -150, -200. She continued to have episodes of depression. Started to wean off of oxcarbazepine. She is in a good mood today. She feels better now that she has significantly reduced her dose of oxcarbazepine. She is hardly taking Zofran. She has headaches around the time of her menstrual cycle, so it is not unusual for her to have headaches.     Interval history 5/13/2024  She reports there have been no seizures, visual auras, periods of confusion or loss of consciousness.  There have been no issues with her balance or shakey vision.  There have been no migraines or headache. She rarely uses Zofran for intermittent nausea when she holiday meals.  Mood have been stable, but she feels that there are days that she is down, lack of energy or lack of enjoyment. There have been no thoughts of self harm. The last time she had a counseling session in 2020. She works 5 days a week, but when she does not work she has no energy to motivate herself.   She feels that her right side is weaker ever since her seizure. She had a fall this past weekend.     Interval history 11/13/2024  She has been doing well since her last visit 6 months ago. She denies any events concerning for seizure. No altered awareness, LOC, visual disturbances.  She still occasionally forgets a dose of lacosamide, but has been trying to be fully compliant. She is setting alarms, but sometimes turns off the alarm and does not take her pill.   At timing of last visit it was again recommended she see a therapist for her depression. She says she has not done this, just cannot get herself to call. There is no reason really. She has been working a lot, extra hours, but outside of that she seems to be unmotivated. She was also referred to PT and has not been motivated to call for that appt either. Her balance is ok, there has not been any worsening, and she denies any falls. 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 She has been eating well and gaining weight, which she is happy about.     Labs 10/30/24 lacosamide level = 6.2 mcg/mL    Special Features  Status epilepticus: No  Self Injury Seizures: No  Precipitating Factors: None     Epilepsy Risk Factors:  Abnormal pregnancy: No  Abnormal birth/: No  Abnormal Development: walked after a year, slower with language  Febrile seizures, simple: Yes  Febrile seizures, complex: No  CNS infection: No  Mental retardation: No  Cerebral palsy: No  Head injury (moderate/severe): No  CNS neoplasm: No  CNS malformation: Yes - MRI brain found ventriculomegaly, abnormal splenium of corpus callosum, and subependymal heterotopia  Neurosurgical procedure: No  Stroke: No  Alcohol abuse: No  Drug abuse: No  Family history Sz/epilepsy: No     Prior AEDs:  Levetiracetam (headaches), oxcarbazepine (continued to have visual aura), gabapentin (side effect), pregabalin (continues to have visual auras and very emotional), lamotrigine (caused more headaches), zonisamide (complaints of weight loss), lacosamide     Prior workup:  [id number]   MRI brain  Bilateral subependymal heterotopias in the region of the atria of the lateral ventricles with bilateral colpocephaly  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  Solitary nonspecific FLAIR hyperintensity subcortical white matter right frontal lobe     2017  MRI brain  Stable colpocephalic appearance to the ventricles, right greater than left, bowing / stretching of the posterior corpus callosum  Subtle foci nodular heterotopia along atria of both lateral ventricles more apparent on the right  Tiny focus of increased signal in the white matter of the right frontal horn is less apparent  Hippocampi are somewhat dysmorphic bilaterally     2019  MRI alonso w/wo  Single white matter hyperintensity in the right anterior  superior frontal lobe (unchanged)  Stable colpocephaly of the lateral ventricles, right greater than left  Symmetric hippocampal formations  No heterotopia was identified     EEGs:  8/25/2016  Intermittent focal slowing over the right posterior temporal region but limited to the T6 electrode, consider repeating the study     9/23/2016  Photic driving is lateralized to the left; thus, possibly relative neuronal dysfunction in the right posterior region.     4/23/2018  Frequent, right posterior temporal delta slowing and occipital sharp activity during 10 Hz photic stimulation.     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  Driving: Yes, [place] 's license.  reports that she has never smoked. She has never used smokeless tobacco. She reports current alcohol use. She reports that she does not use drugs.         Review of Systems   Constitutional: Negative.  Negative for fatigue and fever.   HENT: Negative.  Negative for hearing loss, tinnitus and trouble swallowing.    Eyes:  Negative for photophobia, pain and visual disturbance.   Respiratory: Negative.  Negative for cough and shortness of breath.    Cardiovascular: Negative.  Negative for chest pain and palpitations.   Gastrointestinal:  Negative for constipation, diarrhea, nausea and vomiting.   Endocrine: Negative.    Genitourinary: Negative.  Negative for difficulty urinating and urgency.   Musculoskeletal: Negative.  Negative for back pain, gait problem and neck pain.   Skin: Negative.  Negative for rash.   Neurological: Negative.  Negative for dizziness, tremors, seizures, syncope, speech difficulty, weakness, numbness and headaches.   Hematological: Negative.    Psychiatric/Behavioral:  Negative for decreased concentration and  "sleep disturbance. The patient is not nervous/anxious.     I have personally reviewed the MA's review of systems and made changes as necessary.    Medications Ordered Prior to Encounter[1]      Objective   /78 (BP Location: Left arm, Patient Position: Sitting, Cuff Size: Standard)   Pulse 68   Temp (!) 97.4 °F (36.3 °C) (Temporal)   Resp 18   Ht 5' 2\" (1.575 m)   Wt 59.1 kg (130 lb 3.2 oz)   SpO2 98%   BMI 23.81 kg/m²     Physical Exam  Constitutional:       Appearance: Normal appearance.     Eyes:      Extraocular Movements: EOM normal.      Pupils: Pupils are equal, round, and reactive to light.       Neurological:      Mental Status: She is alert.      Motor: Motor strength is normal.     Deep Tendon Reflexes: Reflexes are normal and symmetric.     Psychiatric:         Mood and Affect: Mood normal.         Speech: Speech normal.         Behavior: Behavior normal.       Neurological Exam  Mental Status  Alert. Oriented to person, place, time and situation. Speech is normal. Language is fluent with no aphasia. Attention and concentration are normal.    Cranial Nerves  CN II: Visual fields full to confrontation.  CN III, IV, VI: Extraocular movements intact bilaterally. Pupils equal round and reactive to light bilaterally.  CN V: Facial sensation is normal.  CN VII: Full and symmetric facial movement.  CN VIII: Hearing is normal.  CN IX, X: Palate elevates symmetrically  CN XI: Shoulder shrug strength is normal.  CN XII: Tongue midline without atrophy or fasciculations.    Motor   Normal muscle tone. Strength is 5/5 throughout all four extremities.    Sensory  Light touch is normal in upper and lower extremities.     Reflexes  Deep tendon reflexes are 2+ and symmetric in all four extremities.    Coordination  Right: Finger-to-nose normal.Left: Finger-to-nose normal.    Gait  Casual gait is normal including stance, stride, and arm swing.         [1]   Current Outpatient Medications on File Prior to Visit "   Medication Sig Dispense Refill    calcium polycarbophil (FIBERCON) 625 mg tablet Take 625 mg by mouth daily Fiber gummie      lacosamide (VIMPAT) 200 mg tablet Take 1 tablet (200 mg total) by mouth every 12 (twelve) hours (Patient not taking: Reported on 5/13/2025) 180 tablet 1     No current facility-administered medications on file prior to visit.

## 2025-05-13 NOTE — PATIENT INSTRUCTIONS
Plan:  - need to re-start lacosamide.  Using 50mg tabs, take 1 tab twice a day x 1 week, then 2 tabs twice a day x 1 week, then 3 tabs twice a day x 1 week, then change to 200mg tabs take 1 tab twice a day  - re-start venlaxafine ER 37.5mg daily  - about 1 week prior to next appointment, get blood work done  - continue to work on establishing with a psychologist and psychiatrist.  [facility] ([place])  -This can help you find a therapist.  In order to find a psychiatrist, can call your insurance and get a list of covered providers.    Return in 6 months or sooner if needed  Call the office if there are any seizures or problems with your medication.  Do not discontinue or change your medications without first discussing with our office

## 2025-05-13 NOTE — ASSESSMENT & PLAN NOTE
Ms. Jenn Cunningham is a 29 y.o. 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  Her seizures have come under control with lacosamide, with less side effects. She has co-morbid anxiety and depressive disorders.     She tells me she stopped all of her medications several months ago.  She was sick with URI with sore throat in January and decided to stop all of her meds because she was not sure if it would interfere with medications prescribed for URI, and also had pain with swallowing pills.  She never restarted any medications because she was “not sure how to”, but never contacted our office about this.  It sounds like she may have had 1 episode concerning for possible seizure about a month ago.  There was no LOC.    We discussed she should not stop her anti-seizure medications unless advised by this office.  We discussed if she has any concerns regarding medication interactions, she should contact us right away.  She questioned staying off AEDs, but we discussed this is not recommended.  She had done very well with lacosamide, therefore will re-titrate her back to the prior dose.       Plan:  Restart lacosamide using 50mg tabs  - take 1 tab twice a day x 1 week, then 2 tabs twice a day x 1 week, then 3 tabs twice a day x 1 week, then change to 200mg tabs take 1 tab twice a day  - after titration continue lacosamide 200mg twice a day  - get blood work done prior to next appt (lacosamide level, BMP)  - do not stop taking your medications unless instructed by our office.  Always call if there are any questions regarding prescribed medications  - call if there are any seizures    Orders:    lacosamide (VIMPAT) 50 mg; Take 1 tablet (50 mg total) by mouth every 12 (twelve) hours Take 1 po BID x 1 week, then 2 po BID x 1 week, then 3 po BID x 1 week, then change to 200mg tabs    Basic metabolic panel; Future    Lacosamide; Future

## 2025-05-14 NOTE — ASSESSMENT & PLAN NOTE
Orders:    ondansetron (ZOFRAN) 4 mg tablet; Take 1 po q8hrs PRN nausea and vomiting     AEB <50% PO

## 2025-07-16 ENCOUNTER — ANNUAL EXAM (OUTPATIENT)
Dept: OBGYN CLINIC | Facility: CLINIC | Age: 30
End: 2025-07-16
Payer: MEDICARE

## 2025-07-16 VITALS — SYSTOLIC BLOOD PRESSURE: 130 MMHG | DIASTOLIC BLOOD PRESSURE: 78 MMHG | BODY MASS INDEX: 22.68 KG/M2 | WEIGHT: 124 LBS

## 2025-07-16 DIAGNOSIS — Z01.419 ENCOUNTER FOR GYNECOLOGICAL EXAMINATION (GENERAL) (ROUTINE) WITHOUT ABNORMAL FINDINGS: Primary | ICD-10-CM

## 2025-07-16 PROCEDURE — 99395 PREV VISIT EST AGE 18-39: CPT | Performed by: STUDENT IN AN ORGANIZED HEALTH CARE EDUCATION/TRAINING PROGRAM

## 2025-07-16 NOTE — PROGRESS NOTES
Jenn Willsmer  1995    Assessment and Plan:  Yearly exam without abnormality.     - Pap with coitarche. We reviewed [facility] guidelines for Pap testing today.   - Breast exam normal today, can consider pelvic exam as needed    RTO one year for yearly exam or sooner as needed.        CC:  Yearly exam    S:  30 y.o. female here for yearly exam.     G0  LMP 7/1   Virginal  Last Pap: none     Non-smoker, social drinker  Exercises irregularly    Doing ok overall. Just celebrated her 30th birthday with her parents in [place] and had a great time!    Her cycles are regular, not heavy or crampy.     Sexual activity: She is virginal.     Gardasil:  She has not had the Gardasil series.     Family hx of breast cancer: denies   Family hx of ovarian cancer: denies   Family hx of colon cancer: denies     Denies hot flushes, dyspareunia, abnormal uterine bleeding, urinary/fecal incontinence, changes in energy levels, mood.     Current Medications[1]  Social History     Socioeconomic History    Marital status: Single     Spouse name: Not on file    Number of children: Not on file    Years of education: Not on file    Highest education level: Not on file   Occupational History    Not on file   Tobacco Use    Smoking status: Never    Smokeless tobacco: Never   Vaping Use    Vaping status: Never Used   Substance and Sexual Activity    Alcohol use: Yes     Comment: socially    Drug use: No    Sexual activity: Never   Other Topics Concern    Not on file   Social History Narrative    Not on file     Social Drivers of Health     Financial Resource Strain: Not on file   Food Insecurity: Not on file   Transportation Needs: Not on file   Physical Activity: Not on file   Stress: Not on file   Social Connections: Not on file   Intimate Partner Violence: Not on file   Housing Stability: Not on file     Family History[2]   Past Medical History[3]     Review of Systems   Respiratory: Negative.    Cardiovascular: Negative.     Gastrointestinal: Negative for constipation and diarrhea.   Genitourinary: Negative for difficulty urinating, pelvic pain, vaginal bleeding, vaginal discharge, itching or odor.    O:  Blood pressure 130/78, weight 56.2 kg (124 lb), last menstrual period 07/01/2025.    Patient appears well and is not in distress. Chaperone declined  Neck is supple without masses  Breasts are symmetrical without mass, tenderness, nipple discharge, skin changes or adenopathy.   Abdomen is soft and nontender without masses.   Pelvic exam deferred today       [1]   Current Outpatient Medications:     calcium polycarbophil (FIBERCON) 625 mg tablet, Take 625 mg by mouth in the morning. Fiber gummie ., Disp: , Rfl:     lacosamide (VIMPAT) 50 mg, Take 1 tablet (50 mg total) by mouth every 12 (twelve) hours Take 1 po BID x 1 week, then 2 po BID x 1 week, then 3 po BID x 1 week, then change to 200mg tabs, Disp: 84 tablet, Rfl: 0    ondansetron (ZOFRAN) 4 mg tablet, Take 1 po q8hrs PRN nausea and vomiting, Disp: 10 tablet, Rfl: 0    venlafaxine (EFFEXOR-XR) 37.5 mg 24 hr capsule, Take 1 capsule (37.5 mg total) by mouth daily, Disp: 90 capsule, Rfl: 3    lacosamide (VIMPAT) 200 mg tablet, Take 1 tablet (200 mg total) by mouth every 12 (twelve) hours (Patient not taking: No sig reported), Disp: 180 tablet, Rfl: 1  [2]   Family History  Problem Relation Name Age of Onset    No Known Problems Mother      No Known Problems Father      No Known Problems Brother      Cancer Maternal Grandmother Ale Bautista     Heart attack Paternal Grandfather Ronaldo Cnuningham    [3]   Past Medical History:  Diagnosis Date    Cerebral ventriculomegaly 08/25/2016    Depression     Epilepsy (HCC)     Headache, tension-type     Migraine     Seizures (HCC)     Self-injurious behavior 09/09/2019    Cutting behavior    Syncopal episodes